# Patient Record
Sex: FEMALE | Race: BLACK OR AFRICAN AMERICAN | Employment: FULL TIME | URBAN - METROPOLITAN AREA
[De-identification: names, ages, dates, MRNs, and addresses within clinical notes are randomized per-mention and may not be internally consistent; named-entity substitution may affect disease eponyms.]

---

## 2017-01-04 ENCOUNTER — HOSPITAL ENCOUNTER (EMERGENCY)
Age: 32
Discharge: HOME | End: 2017-01-04
Attending: EMERGENCY MEDICINE
Payer: SELF-PAY

## 2017-01-04 VITALS
RESPIRATION RATE: 18 BRPM | HEIGHT: 63 IN | TEMPERATURE: 98 F | OXYGEN SATURATION: 100 % | WEIGHT: 140 LBS | BODY MASS INDEX: 24.8 KG/M2 | DIASTOLIC BLOOD PRESSURE: 60 MMHG | HEART RATE: 100 BPM | SYSTOLIC BLOOD PRESSURE: 110 MMHG

## 2017-01-04 DIAGNOSIS — O20.0 THREATENED MISCARRIAGE IN EARLY PREGNANCY: Primary | ICD-10-CM

## 2017-01-04 DIAGNOSIS — R10.9 ABDOMINAL PAIN, OTHER SPECIFIED SITE: ICD-10-CM

## 2017-01-04 LAB
ALBUMIN SERPL BCP-MCNC: 3.8 G/DL (ref 3.5–5)
ALBUMIN/GLOB SERPL: 1.1 {RATIO} (ref 1.2–3.5)
ALP SERPL-CCNC: 68 U/L (ref 50–136)
ALT SERPL-CCNC: 22 U/L (ref 12–65)
ANION GAP BLD CALC-SCNC: 6 MMOL/L (ref 7–16)
AST SERPL W P-5'-P-CCNC: 15 U/L (ref 15–37)
BASOPHILS # BLD AUTO: 0 K/UL (ref 0–0.2)
BASOPHILS # BLD: 0 % (ref 0–2)
BILIRUB SERPL-MCNC: 0.2 MG/DL (ref 0.2–1.1)
BUN SERPL-MCNC: 6 MG/DL (ref 6–23)
CALCIUM SERPL-MCNC: 8.8 MG/DL (ref 8.3–10.4)
CHLORIDE SERPL-SCNC: 105 MMOL/L (ref 98–107)
CO2 SERPL-SCNC: 27 MMOL/L (ref 21–32)
CREAT SERPL-MCNC: 0.72 MG/DL (ref 0.6–1)
DIFFERENTIAL METHOD BLD: ABNORMAL
EOSINOPHIL # BLD: 0.1 K/UL (ref 0–0.8)
EOSINOPHIL NFR BLD: 2 % (ref 0.5–7.8)
ERYTHROCYTE [DISTWIDTH] IN BLOOD BY AUTOMATED COUNT: 12.4 % (ref 11.9–14.6)
GLOBULIN SER CALC-MCNC: 3.6 G/DL (ref 2.3–3.5)
GLUCOSE SERPL-MCNC: 90 MG/DL (ref 65–100)
HCG SERPL-ACNC: ABNORMAL MIU/ML (ref 0–6)
HCG UR QL: POSITIVE
HCT VFR BLD AUTO: 39.4 % (ref 35.8–46.3)
HGB BLD-MCNC: 13.2 G/DL (ref 11.7–15.4)
IMM GRANULOCYTES # BLD: 0 K/UL (ref 0–0.5)
IMM GRANULOCYTES NFR BLD AUTO: 0.2 % (ref 0–5)
LYMPHOCYTES # BLD AUTO: 35 % (ref 13–44)
LYMPHOCYTES # BLD: 2.3 K/UL (ref 0.5–4.6)
MCH RBC QN AUTO: 30 PG (ref 26.1–32.9)
MCHC RBC AUTO-ENTMCNC: 33.5 G/DL (ref 31.4–35)
MCV RBC AUTO: 89.5 FL (ref 79.6–97.8)
MONOCYTES # BLD: 0.5 K/UL (ref 0.1–1.3)
MONOCYTES NFR BLD AUTO: 7 % (ref 4–12)
NEUTS SEG # BLD: 3.7 K/UL (ref 1.7–8.2)
NEUTS SEG NFR BLD AUTO: 56 % (ref 43–78)
PLATELET # BLD AUTO: 236 K/UL (ref 150–450)
PMV BLD AUTO: 9.5 FL (ref 10.8–14.1)
POTASSIUM SERPL-SCNC: 3.9 MMOL/L (ref 3.5–5.1)
PROT SERPL-MCNC: 7.4 G/DL (ref 6.3–8.2)
RBC # BLD AUTO: 4.4 M/UL (ref 4.05–5.25)
SODIUM SERPL-SCNC: 138 MMOL/L (ref 136–145)
WBC # BLD AUTO: 6.6 K/UL (ref 4.3–11.1)

## 2017-01-04 PROCEDURE — 85025 COMPLETE CBC W/AUTO DIFF WBC: CPT | Performed by: EMERGENCY MEDICINE

## 2017-01-04 PROCEDURE — 96375 TX/PRO/DX INJ NEW DRUG ADDON: CPT | Performed by: EMERGENCY MEDICINE

## 2017-01-04 PROCEDURE — 99284 EMERGENCY DEPT VISIT MOD MDM: CPT | Performed by: EMERGENCY MEDICINE

## 2017-01-04 PROCEDURE — 96374 THER/PROPH/DIAG INJ IV PUSH: CPT | Performed by: EMERGENCY MEDICINE

## 2017-01-04 PROCEDURE — 80053 COMPREHEN METABOLIC PANEL: CPT | Performed by: EMERGENCY MEDICINE

## 2017-01-04 PROCEDURE — 81025 URINE PREGNANCY TEST: CPT

## 2017-01-04 PROCEDURE — 74011250636 HC RX REV CODE- 250/636: Performed by: EMERGENCY MEDICINE

## 2017-01-04 PROCEDURE — 84702 CHORIONIC GONADOTROPIN TEST: CPT | Performed by: EMERGENCY MEDICINE

## 2017-01-04 PROCEDURE — 81003 URINALYSIS AUTO W/O SCOPE: CPT | Performed by: EMERGENCY MEDICINE

## 2017-01-04 RX ORDER — SODIUM CHLORIDE 0.9 % (FLUSH) 0.9 %
5-10 SYRINGE (ML) INJECTION AS NEEDED
Status: DISCONTINUED | OUTPATIENT
Start: 2017-01-04 | End: 2017-01-05 | Stop reason: HOSPADM

## 2017-01-04 RX ORDER — ONDANSETRON 2 MG/ML
4 INJECTION INTRAMUSCULAR; INTRAVENOUS
Status: COMPLETED | OUTPATIENT
Start: 2017-01-04 | End: 2017-01-04

## 2017-01-04 RX ORDER — MORPHINE SULFATE 4 MG/ML
4 INJECTION, SOLUTION INTRAMUSCULAR; INTRAVENOUS
Status: COMPLETED | OUTPATIENT
Start: 2017-01-04 | End: 2017-01-04

## 2017-01-04 RX ORDER — SODIUM CHLORIDE 0.9 % (FLUSH) 0.9 %
5-10 SYRINGE (ML) INJECTION EVERY 8 HOURS
Status: DISCONTINUED | OUTPATIENT
Start: 2017-01-04 | End: 2017-01-05 | Stop reason: HOSPADM

## 2017-01-04 RX ADMIN — MORPHINE SULFATE 4 MG: 4 INJECTION, SOLUTION INTRAMUSCULAR; INTRAVENOUS at 20:56

## 2017-01-04 RX ADMIN — ONDANSETRON 4 MG: 2 INJECTION INTRAMUSCULAR; INTRAVENOUS at 20:56

## 2017-01-04 NOTE — ED TRIAGE NOTES
+8 weeks preg, c/o lower abd pain, seen at [facility] yesterday with ultrasound done and was told that they did not see the baby. Pt was instructed to come back if pain persisted.

## 2017-01-05 NOTE — ED PROVIDER NOTES
HPI Comments: No vaginal bleeding today. Patient was seen yesterday in [facility] for this pain and with a history of recent bleeding. She was found to have a quantitative beta hCG in the 40,000 range but only a gestational sac and yolk sac on ultrasound. No fetal heartbeat was noted. Patient is a 36 y.o. female presenting with pregnancy problem. The history is provided by the patient. Pregnancy Problem    This is a new problem. The current episode started 2 days ago. The problem occurs constantly. The problem has not changed since onset. Pain location: lower abdominal pain. The quality of the pain is sharp. The pain is severe. Pertinent negatives include no fever, no diarrhea, no nausea, no vomiting, no dysuria, no frequency, no headaches, no myalgias, no chest pain and no back pain. Nothing worsens the pain. The pain is relieved by nothing. History reviewed. No pertinent past medical history. History reviewed. No pertinent past surgical history. Family History:   Problem Relation Age of Onset    Diabetes Father     Cancer Other     Breast Problems Other        Social History     Social History    Marital status: SINGLE     Spouse name: N/A    Number of children: N/A    Years of education: N/A     Occupational History    Not on file. Social History Main Topics    Smoking status: Never Smoker    Smokeless tobacco: Not on file    Alcohol use No    Drug use: No    Sexual activity: Not on file     Other Topics Concern    Not on file     Social History Narrative         ALLERGIES: Review of patient's allergies indicates no known allergies. Review of Systems   Constitutional: Negative for chills and fever. HENT: Negative for congestion, rhinorrhea and sore throat. Eyes: Negative for photophobia and redness. Respiratory: Negative for cough and shortness of breath. Cardiovascular: Negative for chest pain and leg swelling.    Gastrointestinal: Negative for abdominal pain, diarrhea, nausea and vomiting. Endocrine: Negative for polydipsia and polyuria. Genitourinary: Negative for dysuria and frequency. Musculoskeletal: Negative for back pain and myalgias. Neurological: Negative for weakness, numbness and headaches. Vitals:    01/04/17 1825   BP: 134/68   Pulse: 100   Resp: 16   Temp: 98 °F (36.7 °C)   SpO2: 99%   Weight: 63.5 kg (140 lb)   Height: 5' 3\" (1.6 m)            Physical Exam   Constitutional: She is oriented to person, place, and time. She appears well-developed and well-nourished. HENT:   Mouth/Throat: Oropharynx is clear and moist. No oropharyngeal exudate. Eyes: Conjunctivae are normal. Pupils are equal, round, and reactive to light. Neck: Normal range of motion. Neck supple. Cardiovascular: Normal rate, regular rhythm and normal heart sounds. No murmur heard. Pulmonary/Chest: Breath sounds normal. No respiratory distress. Abdominal: Soft. She exhibits no distension. There is no tenderness. There is no rebound and no guarding. Musculoskeletal: Normal range of motion. She exhibits no edema or tenderness. Neurological: She is alert and oriented to person, place, and time. She has normal strength. No sensory deficit. Skin: Skin is warm and dry. Nursing note and vitals reviewed. MDM  Number of Diagnoses or Management Options  Diagnosis management comments:   ADMISSION DATE: 01/03/2017 08:48    EXAM:  TRANSABDOMINAL/TRANSVAGINAL PELVIC ULTRASOUND    INDICATION:  Vaginal bleeding with positive pregnancy test    COMPARISON:  Ultrasound of 09/17/2014    FINDINGS:    Renal situs and orientation:  Normal    Bladder configuration:  Normal    Free Fluid: Tiny sliver of fluid about the right ovary    Uterus:  Measures 5.6 of 6.6 to 10 centimeters (volume is 192 mL).   There is an intrauterine gestational sac seen.  A yolk sac is identified.  Neither fetal pole or heart beat are identified with either approach.     Right Ovary:  Normal, volume is 11.1 mL    Left Ovary:  Normal, volume is 7.1 mL. Adnexal Structures:  No abnormality demonstrated    From S yesterday  IMPRESSION:    INTRAUTERINE GESTATION IS CONFIRMED WITH YOL[address] BEING VISUALIZED.  HOWEVER, NO FETAL POLE OR HEART BEAT IS IDENTIFIED.  OVARIES  [address] ADNEXAL MASS.    9:21 PM  No fever or leukocytosis to suggest appendicitis. The patient had a pelvic exam last night. PID unlikely in pregnancy. No bleeding this evening. We'll medicate here and have patient follow-up for her outpatient viability ultrasound on the 17th. Amount and/or Complexity of Data Reviewed  Clinical lab tests: ordered and reviewed  Discuss the patient with other providers: yes (Discussed with the attending and the resident and agree [facility].  They reviewed the notes and there was no plan for [facility]  by the maternal-fetal medicine folks. They have a scheduled ultrasound for fetal viability scheduled on the 17th. I discussed all her results tonight and no repeat ultrasound was recommended. Tylenol only for pain was recommended. )      ED Course       Procedures

## 2017-01-05 NOTE — ED NOTES
The patient was given their discharge instructions and  was not given prescriptions. The  patient verbalized understanding and had no additional questions. The patient was alert and was discharged via Wheelchair, without additional complaints at time of discharge.   No apparent distress noted

## 2017-01-05 NOTE — DISCHARGE INSTRUCTIONS
Threatened Miscarriage: Care Instructions  Your Care Instructions    Some women have light spotting or bleeding during the first 12 weeks of pregnancy. In some cases this is normal. Light spotting or bleeding can also be a sign of a possible loss of the pregnancy. This is called a threatened miscarriage. At this point, the doctor may not be able to tell if your vaginal bleeding is normal or is a sign of a miscarriage. In early pregnancy, things such as stress, exercise, and sex do not cause miscarriage. You may be worried or upset about the possibility of losing your pregnancy. But do not blame yourself. There is no treatment to stop a threatened miscarriage. If you do have a miscarriage, there was nothing you could have done to prevent it. A miscarriage usually means that the pregnancy is not developing normally. The doctor has checked you carefully, but problems can develop later. If you notice any problems or new symptoms, get medical treatment right away. Follow-up care is a key part of your treatment and safety. Be sure to make and go to all appointments, and call your doctor if you are having problems. It's also a good idea to know your test results and keep a list of the medicines you take. How can you care for yourself at home? · If you do have a miscarriage, you will probably have some vaginal bleeding for 1 to 2 weeks. Use pads instead of tampons. · Take acetaminophen (Tylenol) for cramps. Read and follow all instructions on the label. · Do not take two or more pain medicines at the same time unless the doctor told you to. Many pain medicines have acetaminophen, which is Tylenol. Too much acetaminophen (Tylenol) can be harmful. · Do not have sex until your doctor says it is okay. · Get lots of rest over the next several days. · You may do your normal activities if you feel well enough to do them. But do not do any heavy exercise until your doctor says it is okay.   · Eat a balanced diet that is high in iron and vitamin C. Foods rich in iron include red meat, shellfish, eggs, beans, and leafy green vegetables. Foods high in vitamin C include citrus fruits, tomatoes, and broccoli. Talk to your doctor about whether you need to take iron pills or a multivitamin. · Do not drink alcohol or use tobacco or illegal drugs. · Do not smoke. If you need help quitting, talk to your doctor about stop-smoking programs and medicines. These can increase your chances of quitting for good. When should you call for help? Call 911 anytime you think you may need emergency care. For example, call if:  · You have sudden, severe pain in your belly or pelvis. · You passed out (lost consciousness). · You have severe vaginal bleeding. Call your doctor now or seek immediate medical care if:  · You are dizzy or lightheaded, or you feel like you may faint. · You have new or increased pain in your belly or pelvis. · Your vaginal bleeding is getting worse. · You have increased pain in the vaginal area. · You have a fever. · You think you may have passed tissue. Save any tissue that you pass. Take it to your doctor's office as soon as you can. Watch closely for changes in your health, and be sure to contact your doctor if:  · You have new or worse vaginal discharge. · You do not get better as expected. Where can you learn more? Go to http://cara-emory.info/. Enter V862 in the search box to learn more about \"Threatened Miscarriage: Care Instructions. \"  Current as of: May 30, 2016  Content Version: 11.1  © 0783-1653 [employer]. Care instructions adapted under license by [employer] (which disclaims liability or warranty for this information). If you have questions about a medical condition or this instruction, always ask your healthcare professional. [address] any warranty or liability for your use of this information.       Belly Pain in Pregnancy: Care Instructions  Your Care Instructions  When you're pregnant, any belly pain can be a worry. You may not want to call your doctor about every pain you have. But you don't want to miss something that is dangerous for you or your baby. Even if it feels familiar, belly pain can mean something new when you're pregnant. It's important to know when to call your doctor. It will also help to know how to care for yourself at home when your pain is not caused by anything harmful. · When belly pain is more severe or constant, see a doctor right away. · If you're sure your belly pain is a sign of labor, call your doctor. · When belly pain is brief, it's usually a normal part of pregnancy. It might be related to changes in the growing uterus. Or it could be the stretching of ligaments called round ligaments. These ligaments help support the uterus. Round ligament pain can be on either side of your belly. It can also be felt in your hips or groin. Follow-up care is a key part of your treatment and safety. Be sure to make and go to all appointments, and call your doctor if you are having problems. It's also a good idea to know your test results and keep a list of the medicines you take. How can you tell if belly pain is a sign of labor? When belly pain is caused by labor, it can feel like mild or menstrual-like cramps in your lower belly. These cramps are probably contractions. They can happen in your second or third trimester. You may also have:  · A steady, dull ache in your lower back, pelvis, or thighs. · A feeling of pressure in your pelvis or lower belly. · Changes in your vaginal discharge or a sudden release of fluid from the vagina. If you think you are in labor, call your doctor. How can you care for yourself at home? When belly pain is mild and is not a symptom of labor:  · Rest until you feel better. · Take a warm bath. · Think about what you drink and eat:  ¨ Drink plenty of fluids.  Choose water and other caffeine-free clear liquids until you feel better. ¨ Try eating small, frequent meals. If your stomach is upset, try bland, low-fat foods like plain rice, broiled chicken, toast, and yogurt. · Think about how you move if you are having brief pains from stretching of the round ligaments. ¨ Try gentle stretching. ¨ Move a little more slowly when turning in bed or getting up from a chair, so those ligaments don't stretch quickly. ¨ Lean forward a bit if you think you are going to cough or sneeze. When should you call for help? Call 911 anytime you think you may need emergency care. For example, call if:  · You have sudden, severe pain in your belly. · You have severe vaginal bleeding. Call your doctor now or seek immediate medical care if:  · You have new or worse belly pain or cramping. · You have any vaginal bleeding. · You have a fever. · You have symptoms of preeclampsia, such as:  ¨ Sudden swelling of your face, hands, or feet. ¨ New vision problems (such as dimness or blurring). ¨ A severe headache. · You think that you may be in labor. This means that you've had at least 8 contractions within 1 hour or at least 4 contractions within 20 minutes, even after you change your position and drink fluids. · You have symptoms of a urinary tract infection. These may include:  ¨ Pain or burning when you urinate. ¨ A frequent need to urinate without being able to pass much urine. ¨ Pain in the flank, which is just below the rib cage and above the waist on either side of the back. ¨ Blood in your urine. Watch closely for changes in your health, and be sure to contact your doctor if you are worried about your or your baby's health. Where can you learn more? Go to Snaapiq.be  Enter B275 in the search box to learn more about \"Belly Pain in Pregnancy: Care Instructions. \"   © 7950-8353 [employer] Inc[employer].  Care instructions adapted under license by [facility] (which disclaims liability or warranty for this information). This care instruction is for use with your licensed healthcare professional. If you have questions about a medical condition or this instruction, always ask your healthcare professional. [address] any warranty or liability for your use of this information.   Content Version: 45.9.472528; Current as of: November 12, 2015

## 2019-03-10 ENCOUNTER — HOSPITAL ENCOUNTER (EMERGENCY)
Age: 34
Discharge: HOME | End: 2019-03-10
Attending: EMERGENCY MEDICINE
Payer: COMMERCIAL

## 2019-03-10 VITALS
OXYGEN SATURATION: 100 % | BODY MASS INDEX: 24.8 KG/M2 | TEMPERATURE: 98.8 F | RESPIRATION RATE: 17 BRPM | WEIGHT: 140 LBS | SYSTOLIC BLOOD PRESSURE: 119 MMHG | HEIGHT: 63 IN | HEART RATE: 79 BPM | DIASTOLIC BLOOD PRESSURE: 66 MMHG

## 2019-03-10 DIAGNOSIS — R55 NEAR SYNCOPE: Primary | ICD-10-CM

## 2019-03-10 LAB
ALBUMIN SERPL-MCNC: 3.3 G/DL (ref 3.5–5)
ALBUMIN/GLOB SERPL: 1 {RATIO}
ALP SERPL-CCNC: 61 U/L (ref 50–130)
ALT SERPL-CCNC: 17 U/L (ref 12–65)
ANION GAP SERPL CALC-SCNC: 8 MMOL/L
AST SERPL-CCNC: 30 U/L (ref 15–37)
BASOPHILS # BLD: 0 K/UL (ref 0–0.2)
BASOPHILS NFR BLD: 0 % (ref 0–2)
BILIRUB SERPL-MCNC: 0.3 MG/DL (ref 0.2–1.1)
BUN SERPL-MCNC: 10 MG/DL (ref 6–23)
CALCIUM SERPL-MCNC: 8.8 MG/DL (ref 8.3–10.4)
CHLORIDE SERPL-SCNC: 108 MMOL/L (ref 98–107)
CO2 SERPL-SCNC: 23 MMOL/L (ref 21–32)
CREAT SERPL-MCNC: 0.86 MG/DL (ref 0.6–1)
DIFFERENTIAL METHOD BLD: ABNORMAL
EOSINOPHIL # BLD: 0 K/UL (ref 0–0.8)
EOSINOPHIL NFR BLD: 0 % (ref 0.5–7.8)
ERYTHROCYTE [DISTWIDTH] IN BLOOD BY AUTOMATED COUNT: 12.4 % (ref 11.9–14.6)
GLOBULIN SER CALC-MCNC: 3.3 G/DL (ref 2.3–3.5)
GLUCOSE SERPL-MCNC: 77 MG/DL (ref 65–100)
HCG UR QL: NEGATIVE
HCT VFR BLD AUTO: 42.2 % (ref 35.8–46.3)
HGB BLD-MCNC: 13.5 G/DL (ref 11.7–15.4)
IMM GRANULOCYTES # BLD AUTO: 0 K/UL (ref 0–0.5)
IMM GRANULOCYTES NFR BLD AUTO: 0 % (ref 0–5)
LYMPHOCYTES # BLD: 1.1 K/UL (ref 0.5–4.6)
LYMPHOCYTES NFR BLD: 15 % (ref 13–44)
MCH RBC QN AUTO: 30.1 PG (ref 26.1–32.9)
MCHC RBC AUTO-ENTMCNC: 32 G/DL (ref 31.4–35)
MCV RBC AUTO: 94 FL (ref 79.6–97.8)
MONOCYTES # BLD: 0.5 K/UL (ref 0.1–1.3)
MONOCYTES NFR BLD: 7 % (ref 4–12)
NEUTS SEG # BLD: 5.8 K/UL (ref 1.7–8.2)
NEUTS SEG NFR BLD: 78 % (ref 43–78)
NRBC # BLD: 0 K/UL (ref 0–0.2)
PLATELET # BLD AUTO: 277 K/UL (ref 150–450)
PMV BLD AUTO: 9.5 FL (ref 9.4–12.3)
POTASSIUM SERPL-SCNC: 5.3 MMOL/L (ref 3.5–5.1)
PROT SERPL-MCNC: 6.6 G/DL
RBC # BLD AUTO: 4.49 M/UL (ref 4.05–5.2)
SODIUM SERPL-SCNC: 139 MMOL/L (ref 136–145)
WBC # BLD AUTO: 7.5 K/UL (ref 4.3–11.1)

## 2019-03-10 PROCEDURE — 99284 EMERGENCY DEPT VISIT MOD MDM: CPT | Performed by: EMERGENCY MEDICINE

## 2019-03-10 PROCEDURE — 80053 COMPREHEN METABOLIC PANEL: CPT

## 2019-03-10 PROCEDURE — 96360 HYDRATION IV INFUSION INIT: CPT | Performed by: EMERGENCY MEDICINE

## 2019-03-10 PROCEDURE — 81025 URINE PREGNANCY TEST: CPT

## 2019-03-10 PROCEDURE — 74011250636 HC RX REV CODE- 250/636: Performed by: EMERGENCY MEDICINE

## 2019-03-10 PROCEDURE — 96361 HYDRATE IV INFUSION ADD-ON: CPT | Performed by: EMERGENCY MEDICINE

## 2019-03-10 PROCEDURE — 81003 URINALYSIS AUTO W/O SCOPE: CPT | Performed by: EMERGENCY MEDICINE

## 2019-03-10 PROCEDURE — 85025 COMPLETE CBC W/AUTO DIFF WBC: CPT

## 2019-03-10 RX ADMIN — SODIUM CHLORIDE 1000 ML: 900 INJECTION, SOLUTION INTRAVENOUS at 11:40

## 2019-03-10 NOTE — DISCHARGE INSTRUCTIONS
Patient Education        Lightheadedness or Faintness: Care Instructions  Your Care Instructions  Lightheadedness is a feeling that you are about to faint or \"pass out. \" You do not feel as if you or your surroundings are moving. It is different from vertigo, which is the feeling that you or things around you are spinning or tilting. Lightheadedness usually goes away or gets better when you lie down. If lightheadedness gets worse, it can lead to a fainting spell. It is common to feel lightheaded from time to time. Lightheadedness usually is not caused by a serious problem. It often is caused by a short-lasting drop in blood pressure and blood flow to your head that occurs when you get up too quickly from a seated or lying position. Follow-up care is a key part of your treatment and safety. Be sure to make and go to all appointments, and call your doctor if you are having problems. It's also a good idea to know your test results and keep a list of the medicines you take. How can you care for yourself at home? · Lie down for 1 or 2 minutes when you feel lightheaded. After lying down, sit up slowly and remain sitting for 1 to 2 minutes before slowly standing up. · Avoid movements, positions, or activities that have made you lightheaded in the past.  · Get plenty of rest, especially if you have a cold or flu, which can cause lightheadedness. · Make sure you drink plenty of fluids, especially if you have a fever or have been sweating. · Do not drive or put yourself and others in danger while you feel lightheaded. When should you call for help? Call 911 anytime you think you may need emergency care. For example, call if:    · You have symptoms of a stroke. These may include:  ? Sudden numbness, tingling, weakness, or loss of movement in your face, arm, or leg, especially on only one side of your body. ? Sudden vision changes. ? Sudden trouble speaking.   ? Sudden confusion or trouble understanding simple statements. ? Sudden problems with walking or balance. ? A sudden, severe headache that is different from past headaches.     · You have symptoms of a heart attack. These may include:  ? Chest pain or pressure, or a strange feeling in the chest.  ? Sweating. ? Shortness of breath. ? Nausea or vomiting. ? Pain, pressure, or a strange feeling in the back, neck, jaw, or upper belly or in one or both shoulders or arms. ? Lightheadedness or sudden weakness. ? A fast or irregular heartbeat. After you call 911, the  may tell you to chew 1 adult-strength or 2 to 4 low-dose aspirin. Wait for an ambulance. Do not try to drive yourself.    Watch closely for changes in your health, and be sure to contact your doctor if:    · Your lightheadedness gets worse or does not get better with home care. Where can you learn more? Go to http://cara-emory.info/. Enter A560 in the search box to learn more about \"Lightheadedness or Faintness: Care Instructions. \"  Current as of: September 23, 2018  Content Version: 11.9  © 5183-4084 [employer]. Care instructions adapted under license by [employer] (which disclaims liability or warranty for this information). If you have questions about a medical condition or this instruction, always ask your healthcare professional. [address] any warranty or liability for your use of this information.

## 2019-03-10 NOTE — ED PROVIDER NOTES
Patient had a heavier than normal.  Lasting 7 days, finishing one to 2 days ago. Has had increased exercise routine recently and taking care of 2 kids. He has also been on diet. Ate A banana for breakfast.  He has not eaten anything since then. Was at a workout with her  and after 30 minutes, became very lightheaded and weak. No headache. No chest pain or shortness of breath. Mild nausea. EMS states patient was pale on their arrival with blood pressure systolic 82. Improved with fluids. Feeling some better here. The history is provided by the patient. No  was used. Dizziness   This is a new problem. The current episode started less than 1 hour ago. The problem has been gradually improving. There was no focality noted. Pertinent negatives include no focal weakness, no loss of sensation, no loss of balance, no slurred speech, no speech difficulty, no movement disorder, no visual change, no mental status change, no unresponsiveness and no disorientation. There has been no fever. Associated symptoms include nausea. Pertinent negatives include no shortness of breath, no chest pain, no vomiting, no altered mental status, no confusion, no headaches, no bowel incontinence and no bladder incontinence. History reviewed. No pertinent past medical history. History reviewed. No pertinent surgical history.       Family History:   Problem Relation Age of Onset    Diabetes Father     Cancer Other     Breast Problems Other        Social History     Socioeconomic History    Marital status: SINGLE     Spouse name: Not on file    Number of children: Not on file    Years of education: Not on file    Highest education level: Not on file   Social Needs    Financial resource strain: Not on file    Food insecurity - worry: Not on file    Food insecurity - inability: Not on file   [employer] needs - medical: Not on file   [employer] needs - non-medical: Not on file Occupational History    Not on file   Tobacco Use    Smoking status: Never Smoker   Substance and Sexual Activity    Alcohol use: No    Drug use: No    Sexual activity: Not on file   Other Topics Concern    Not on file   Social History Narrative    Not on file         ALLERGIES: Patient has no known allergies. Review of Systems   Constitutional: Negative for chills and fever. HENT: Negative for rhinorrhea and sore throat. Eyes: Negative for pain and redness. Respiratory: Negative for chest tightness, shortness of breath and wheezing. Cardiovascular: Negative for chest pain and leg swelling. Gastrointestinal: Positive for nausea. Negative for abdominal pain, bowel incontinence, diarrhea and vomiting. Genitourinary: Negative for bladder incontinence, dysuria, hematuria, vaginal bleeding and vaginal discharge. Musculoskeletal: Negative for back pain, gait problem, neck pain and neck stiffness. Skin: Negative for color change and rash. Neurological: Positive for syncope (near syncope) and light-headedness. Negative for focal weakness, speech difficulty, weakness, numbness, headaches and loss of balance. Psychiatric/Behavioral: Negative for confusion. There were no vitals filed for this visit. Physical Exam   Constitutional: She is oriented to person, place, and time. She appears well-developed and well-nourished. HENT:   Head: Normocephalic and atraumatic. Eyes: EOM are normal. Pupils are equal, round, and reactive to light. Neck: Normal range of motion. Neck supple. Cardiovascular: Normal rate and regular rhythm. Pulmonary/Chest: Effort normal and breath sounds normal.   Abdominal: Soft. Bowel sounds are normal. There is no tenderness. Musculoskeletal: Normal range of motion. She exhibits no edema. Neurological: She is alert and oriented to person, place, and time. Skin: Skin is warm and dry.         MDM  Number of Diagnoses or Management Options  Diagnosis management comments: Patient with near syncopal episode, feeling better here with fluids. Will discharge. Amount and/or Complexity of Data Reviewed  Clinical lab tests: ordered and reviewed  Tests in the medicine section of CPT®: ordered and reviewed    Patient Progress  Patient progress: stable         Procedures           Results Include:    Recent Results (from the past 24 hour(s))   METABOLIC PANEL, COMPREHENSIVE    Collection Time: 03/10/19 12:04 PM   Result Value Ref Range    Sodium 139 136 - 145 mmol/L    Potassium 5.3 (H) 3.5 - 5.1 mmol/L    Chloride 108 (H) 98 - 107 mmol/L    CO2 23 21 - 32 mmol/L    Anion gap 8 mmol/L    Glucose 77 65 - 100 mg/dL    BUN 10 6 - 23 MG/DL    Creatinine 0.86 0.6 - 1.0 MG/DL    GFR est AA >60 >60 ml/min/1.73m2    GFR est non-AA >60 ml/min/1.73m2    Calcium 8.8 8.3 - 10.4 MG/DL    Bilirubin, total 0.3 0.2 - 1.1 MG/DL    ALT (SGPT) 17 12 - 65 U/L    AST (SGOT) 30 15 - 37 U/L    Alk. phosphatase 61 50 - 130 U/L    Protein, total 6.6 g/dL    Albumin 3.3 (L) 3.5 - 5.0 g/dL    Globulin 3.3 2.3 - 3.5 g/dL    A-G Ratio 1.0     CBC WITH AUTOMATED DIFF    Collection Time: 03/10/19 12:15 PM   Result Value Ref Range    WBC 7.5 4.3 - 11.1 K/uL    RBC 4.49 4.05 - 5.2 M/uL    HGB 13.5 11.7 - 15.4 g/dL    HCT 42.2 35.8 - 46.3 %    MCV 94.0 79.6 - 97.8 FL    MCH 30.1 26.1 - 32.9 PG    MCHC 32.0 31.4 - 35.0 g/dL    RDW 12.4 11.9 - 14.6 %    PLATELET 706 271 - 175 K/uL    MPV 9.5 9.4 - 12.3 FL    ABSOLUTE NRBC 0.00 0.0 - 0.2 K/uL    DF AUTOMATED      NEUTROPHILS 78 43 - 78 %    LYMPHOCYTES 15 13 - 44 %    MONOCYTES 7 4.0 - 12.0 %    EOSINOPHILS 0 (L) 0.5 - 7.8 %    BASOPHILS 0 0.0 - 2.0 %    IMMATURE GRANULOCYTES 0 0.0 - 5.0 %    ABS. NEUTROPHILS 5.8 1.7 - 8.2 K/UL    ABS. LYMPHOCYTES 1.1 0.5 - 4.6 K/UL    ABS. MONOCYTES 0.5 0.1 - 1.3 K/UL    ABS. EOSINOPHILS 0.0 0.0 - 0.8 K/UL    ABS. BASOPHILS 0.0 0.0 - 0.2 K/UL    ABS. IMM.  GRANS. 0.0 0.0 - 0.5 K/UL   HCG URINE, QL. - POC    Collection Time: 03/10/19 12:54 PM   Result Value Ref Range    Pregnancy test,urine (POC) NEGATIVE  NEG

## 2019-03-10 NOTE — ED TRIAGE NOTES
Pt in c/o dizziness after working out at gym. Pt initial bp was 89 systolic with ems. Pt given 300 ml lactated ringer's and bp increased to 455 systolic. Pt states recently finished her period. States she a only a banana prior to going to gym. bgl with ems 93.

## 2019-03-10 NOTE — ED NOTES
I have reviewed discharge instructions with the patient. The patient verbalized understanding. Patient left ED via Discharge Method: ambulatory to Home with boyfriend. Opportunity for questions and clarification provided. Patient given 0 scripts. To continue your aftercare when you leave the hospital, you may receive an automated call from our care team to check in on how you are doing. This is a free service and part of our promise to provide the best care and service to meet your aftercare needs.  If you have questions, or wish to unsubscribe from this service please call [phone]. Thank you for Choosing our [facility] Emergency Department.

## 2019-10-02 ENCOUNTER — HOSPITAL ENCOUNTER (EMERGENCY)
Age: 34
Discharge: HOME | End: 2019-10-02
Attending: EMERGENCY MEDICINE
Payer: COMMERCIAL

## 2019-10-02 ENCOUNTER — APPOINTMENT (OUTPATIENT)
Dept: GENERAL RADIOLOGY | Age: 34
End: 2019-10-02
Attending: EMERGENCY MEDICINE
Payer: COMMERCIAL

## 2019-10-02 VITALS
BODY MASS INDEX: 25.3 KG/M2 | SYSTOLIC BLOOD PRESSURE: 126 MMHG | WEIGHT: 142.8 LBS | RESPIRATION RATE: 18 BRPM | OXYGEN SATURATION: 100 % | HEART RATE: 65 BPM | DIASTOLIC BLOOD PRESSURE: 82 MMHG | HEIGHT: 63 IN | TEMPERATURE: 98.3 F

## 2019-10-02 DIAGNOSIS — S63.502A SPRAIN OF LEFT WRIST, INITIAL ENCOUNTER: ICD-10-CM
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
## 2019-10-02 PROCEDURE — 73110 X-RAY EXAM OF WRIST: CPT

## 2019-10-02 PROCEDURE — 99283 EMERGENCY DEPT VISIT LOW MDM: CPT | Performed by: EMERGENCY MEDICINE

## 2019-10-02 RX ORDER — CYCLOBENZAPRINE HCL 5 MG
10 TABLET ORAL
Qty: 20 TAB | Refills: 0 | Status: SHIPPED | OUTPATIENT
Start: 2019-10-02 | End: 2019-10-16

## 2019-10-02 RX ORDER — DICLOFENAC POTASSIUM 50 MG/1
50 TABLET, FILM COATED ORAL 3 TIMES DAILY
Qty: 15 TAB | Refills: 0 | Status: SHIPPED | OUTPATIENT
Start: 2019-10-02 | End: 2019-10-16 | Stop reason: SDDI
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
## 2019-10-02 NOTE — LETTER
[id number] [address] EMERGENCY DEPT  [address] [facility] [address] [phone]    Work/School Note    Date: 10/2/2019    To Whom It May concern:    Jose QUEZADA JulianTejalLinwood was seen and treated today in the emergency room by the following provider(s):  Attending Provider: Lawrence Nunez MD.      Gerald Casarez may return to work on 10/3/19.     Sincerely,          Latrell Draper MD

## 2019-10-02 NOTE — ED NOTES
I have reviewed discharge instructions with the patient. The patient verbalized understanding. Patient left ED via Discharge Method: ambulatory to Home with male  at bedside. Opportunity for questions and clarification provided. Patient given 2 scripts. To continue your aftercare when you leave the hospital, you may receive an automated call from our care team to check in on how you are doing. This is a free service and part of our promise to provide the best care and service to meet your aftercare needs.  If you have questions, or wish to unsubscribe from this service please call [phone]. Thank you for Choosing our [facility] Emergency Department.
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
## 2019-10-09 ENCOUNTER — APPOINTMENT (OUTPATIENT)
Dept: GENERAL RADIOLOGY | Age: 34
End: 2019-10-09
Attending: EMERGENCY MEDICINE
Payer: COMMERCIAL

## 2019-10-09 ENCOUNTER — HOSPITAL ENCOUNTER (EMERGENCY)
Age: 34
Discharge: HOME | End: 2019-10-09
Attending: EMERGENCY MEDICINE
Payer: COMMERCIAL

## 2019-10-09 VITALS
HEART RATE: 82 BPM | WEIGHT: 143 LBS | SYSTOLIC BLOOD PRESSURE: 128 MMHG | BODY MASS INDEX: 25.34 KG/M2 | OXYGEN SATURATION: 99 % | DIASTOLIC BLOOD PRESSURE: 73 MMHG | TEMPERATURE: 98.8 F | HEIGHT: 63 IN | RESPIRATION RATE: 18 BRPM

## 2019-10-09 DIAGNOSIS — S69.90XD INJURY OF WRIST, UNSPECIFIED LATERALITY, SUBSEQUENT ENCOUNTER: Primary | ICD-10-CM

## 2019-10-09 DIAGNOSIS — J06.9 VIRAL URI: ICD-10-CM

## 2019-10-09 DIAGNOSIS — M85.60 BONE CYST: ICD-10-CM

## 2019-10-09 DIAGNOSIS — F07.81 POSTCONCUSSIVE SYNDROME: ICD-10-CM

## 2019-10-09 PROCEDURE — 77030008298 HC SPLNT FBRGLS SCTCH 3M -A: Performed by: EMERGENCY MEDICINE

## 2019-10-09 PROCEDURE — 74011250636 HC RX REV CODE- 250/636: Performed by: EMERGENCY MEDICINE

## 2019-10-09 PROCEDURE — 77030019945 HC PDNG CST 3M -A: Performed by: EMERGENCY MEDICINE

## 2019-10-09 PROCEDURE — 96372 THER/PROPH/DIAG INJ SC/IM: CPT | Performed by: EMERGENCY MEDICINE

## 2019-10-09 PROCEDURE — 73110 X-RAY EXAM OF WRIST: CPT

## 2019-10-09 PROCEDURE — 75810000053 HC SPLINT APPLICATION: Performed by: EMERGENCY MEDICINE

## 2019-10-09 PROCEDURE — 99283 EMERGENCY DEPT VISIT LOW MDM: CPT | Performed by: EMERGENCY MEDICINE

## 2019-10-09 RX ORDER — KETOROLAC TROMETHAMINE 30 MG/ML
30 INJECTION, SOLUTION INTRAMUSCULAR; INTRAVENOUS
Status: COMPLETED | OUTPATIENT
Start: 2019-10-09 | End: 2019-10-09

## 2019-10-09 RX ADMIN — KETOROLAC TROMETHAMINE 30 MG: 30 INJECTION, SOLUTION INTRAMUSCULAR at 09:32

## 2019-10-09 NOTE — LETTER
[address] [address] EMERGENCY [address]PT  [address] [facility] [address] [phone]    Work/School Note    Date: 10/9/2019    To Whom It May concern:    Jose JordanTejalLinwood was seen and treated today in the emergency room by the following provider(s):  Attending Provider: Sabrina Cloud MD.      Latonya Canales may return to work on 10- IF she is able to do her job with a splinted arm, OR is assigned a job that she is able to do with a splinted arm. If not, then she should be excused from work until a follow-up physician clears her to return to work.      Sincerely,          Sharona Alexander RN
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
## 2019-10-09 NOTE — PROGRESS NOTES
Visited with patient as no PCP listed in chart. Demographics on face sheet verified. E-mail sent to [facility] for aid in finding a new PCP.

## 2019-10-09 NOTE — ED NOTES
I have reviewed discharge instructions with the patient. The patient verbalized understanding. Patient left ED via Discharge Method: ambulatory to Home with self. Opportunity for questions and clarification provided. Patient given 0 scripts. To continue your aftercare when you leave the hospital, you may receive an automated call from our care team to check in on how you are doing. This is a free service and part of our promise to provide the best care and service to meet your aftercare needs.  If you have questions, or wish to unsubscribe from this service please call [phone]. Thank you for Choosing our [facility] Emergency Department.

## 2019-10-09 NOTE — DISCHARGE INSTRUCTIONS
Patient Education      Ibuprofen 400 to 600 mg every 6 hours for pain. Tylenol 500 to 650 mg every 6 hours for pain. Over-the-counter Sudafed 12-hour twice daily for 4 to 5 days for sinus congestion. Over-the-counter saltwater nasal spray and sinus follow-up with work well for work note needs and with orthopedics for wrist injury/possible scaphoid (wrist bone) fracture vs cyst.  Saline Nasal Washes: Care Instructions  Your Care Instructions  Saline nasal washes help keep the nasal passages open by washing out thick or dried mucus. This simple remedy can help relieve symptoms of allergies, sinusitis, and colds. It also can make the nose feel more comfortable by keeping the mucous membranes moist. You may notice a little burning sensation in your nose the first few times you use the solution, but this usually gets better in a few days. Follow-up care is a key part of your treatment and safety. Be sure to make and go to all appointments, and call your doctor if you are having problems. It's also a good idea to know your test results and keep a list of the medicines you take. How can you care for yourself at home? · You can buy premixed saline solution in a squeeze bottle or other sinus rinse products at a drugstore. Read and follow the instructions on the label. · You also can make your own saline solution by adding 1 teaspoon of salt and 1 teaspoon of baking soda to 2 cups of distilled water. · If you use a homemade solution, pour a small amount into a clean bowl. Using a rubber bulb syringe, squeeze the syringe and place the tip in the salt water. Pull a small amount of the salt water into the syringe by relaxing your hand. · Sit down with your head tilted slightly back. Do not lie down. Put the tip of the bulb syringe or the squeeze bottle a little way into one of your nostrils. Gently drip or squirt a few drops into the nostril. Repeat with the other nostril.  Some sneezing and gagging are normal at first.  · Gently blow your nose. · Wipe the syringe or bottle tip clean after each use. · Repeat this 2 or 3 times a day. · Use nasal washes gently if you have nosebleeds often. When should you call for help? Watch closely for changes in your health, and be sure to contact your doctor if:    · You often get nosebleeds.     · You have problems doing the nasal washes. Where can you learn more? Go to http://cara-emory.info/. Enter [id number] in the search box to learn more about \"Saline Nasal Washes: Care Instructions. \"  Current as of: October 21, 2018  Content Version: 12.2  © 2325-8894 [employer]. Care instructions adapted under license by [employer] (which disclaims liability or warranty for this information). If you have questions about a medical condition or this instruction, always ask your healthcare professional. [place] [address] any warranty or liability for your use of this information. Patient Education        Postconcussion Syndrome: Care Instructions  Your Care Instructions    Postconcussion syndrome occurs after a blow to the head or body. Common symptoms are changes in the ability to concentrate, think, remember, or solve problems. Symptoms, which may include headaches, personality changes, and dizziness, may be related to stress from the events surrounding the accident that caused the injury. Follow-up care is a key part of your treatment and safety. Be sure to make and go to all appointments, and call your doctor if you are having problems. It's also a good idea to know your test results and keep a list of the medicines you take. How can you care for yourself at home? Pain  · Rest is the best treatment for postconcussion syndrome. · Do not drive if you have taken a prescription pain medicine. · Rest in a quiet, dark room until your headache is gone. Close your eyes and try to relax or go to sleep.  Do not watch TV or read.  · Put a cold, moist cloth or cold pack on the painful area for 10 to 20 minutes at a time. Put a thin cloth between the cold pack and your skin. · Have someone gently massage your neck and shoulders. · Take your medicines exactly as prescribed. Call your doctor if you think you are having a problem with your medicine. You will get more details on the specific medicines your doctor prescribes. Stress  · Try to reduce stress. Some ways to do this include:  ? Taking slow, deep breaths. ? Soaking in a warm bath. ? Listening to soothing music. ? Having a massage or back rub. ? Drinking a warm, nonalcoholic, noncaffeinated beverage. · Get enough sleep. · Eat a healthy, balanced diet. A balanced diet includes whole grains, dairy, fruits and vegetables, and protein. Eat a variety of foods from each of those groups so you get all the nutrients you need. · Avoid alcohol and illegal drugs. · Try relaxation exercises, such as breathing and muscle relaxation exercises. · Talk to your doctor about counseling. It may help you deal with stress from your accident. When should you call for help? Watch closely for changes in your health, and be sure to contact your doctor if:    · You do not get better as expected.     · Your symptoms, such as headaches, trouble concentrating, or changes in mood, get worse. Where can you learn more? Go to http://cara-emory.info/. Enter Z154 in the search box to learn more about \"Postconcussion Syndrome: Care Instructions. \"  Current as of: March 28, 2019  Content Version: 12.2  © 5042-3724 [employer]. Care instructions adapted under license by [employer] (which disclaims liability or warranty for this information). If you have questions about a medical condition or this instruction, always ask your healthcare professional. [address] any warranty or liability for your use of this information.

## 2019-10-16 PROBLEM — F33.9 MDD (MAJOR DEPRESSIVE DISORDER), RECURRENT EPISODE (HCC): Status: ACTIVE | Noted: 2019-10-16

## 2019-10-16 PROBLEM — Z83.79 FAMILY HISTORY OF CROHN'S DISEASE: Status: ACTIVE | Noted: 2019-10-16

## 2019-10-16 PROBLEM — F07.81 POST CONCUSSION SYNDROME: Status: ACTIVE | Noted: 2019-10-16

## 2019-10-16 PROBLEM — J30.9 AR (ALLERGIC RHINITIS): Status: ACTIVE | Noted: 2019-10-16

## 2019-10-16 PROBLEM — K21.9 GERD (GASTROESOPHAGEAL REFLUX DISEASE): Status: ACTIVE | Noted: 2019-10-16

## 2021-08-11 ENCOUNTER — HOSPITAL ENCOUNTER (OUTPATIENT)
Dept: LAB | Age: 36
Discharge: HOME | End: 2021-08-11
Payer: COMMERCIAL

## 2021-08-11 ENCOUNTER — HOSPITAL ENCOUNTER (EMERGENCY)
Age: 36
Discharge: STILL ADMITTED OR ADMINISTRATIVE | End: 2021-08-11
Payer: COMMERCIAL

## 2021-08-11 LAB — HCG SERPL-ACNC: ABNORMAL MIU/ML (ref 0–6)

## 2021-08-11 PROCEDURE — 84702 CHORIONIC GONADOTROPIN TEST: CPT

## 2021-08-11 PROCEDURE — 36415 COLL VENOUS BLD VENIPUNCTURE: CPT

## 2021-08-18 ENCOUNTER — HOSPITAL ENCOUNTER (OUTPATIENT)
Age: 36
Setting detail: OBSERVATION
Discharge: HOME | End: 2021-08-20
Attending: EMERGENCY MEDICINE | Admitting: OBSTETRICS & GYNECOLOGY
Payer: COMMERCIAL

## 2021-08-18 DIAGNOSIS — R11.10 HYPEREMESIS: ICD-10-CM

## 2021-08-18 DIAGNOSIS — O21.0 HYPEREMESIS GRAVIDARUM: Primary | ICD-10-CM

## 2021-08-18 PROBLEM — F17.200 CURRENT SMOKER ON SOME DAYS: Status: ACTIVE | Noted: 2020-10-28

## 2021-08-18 PROBLEM — Z01.411 ENCOUNTER FOR GYNECOLOGICAL EXAMINATION (GENERAL) (ROUTINE) WITH ABNORMAL FINDINGS: Status: ACTIVE | Noted: 2020-10-08

## 2021-08-18 PROBLEM — N89.8 VAGINAL DISCHARGE: Status: ACTIVE | Noted: 2020-10-29

## 2021-08-18 PROBLEM — F32.A ANXIETY AND DEPRESSION: Status: ACTIVE | Noted: 2020-10-28

## 2021-08-18 PROBLEM — F41.9 ANXIETY AND DEPRESSION: Status: ACTIVE | Noted: 2020-10-28

## 2021-08-18 PROBLEM — N93.9 ABNORMAL UTERINE BLEEDING: Status: ACTIVE | Noted: 2020-10-29

## 2021-08-18 LAB
ANION GAP SERPL CALC-SCNC: 4 MMOL/L (ref 7–16)
BASOPHILS # BLD: 0 K/UL (ref 0–0.2)
BASOPHILS NFR BLD: 0 % (ref 0–2)
BUN SERPL-MCNC: 7 MG/DL (ref 6–23)
CALCIUM SERPL-MCNC: 9.2 MG/DL (ref 8.3–10.4)
CHLORIDE SERPL-SCNC: 107 MMOL/L (ref 98–107)
CO2 SERPL-SCNC: 27 MMOL/L (ref 21–32)
CREAT SERPL-MCNC: 0.62 MG/DL (ref 0.6–1)
DIFFERENTIAL METHOD BLD: ABNORMAL
EOSINOPHIL # BLD: 0.1 K/UL (ref 0–0.8)
EOSINOPHIL NFR BLD: 1 % (ref 0.5–7.8)
ERYTHROCYTE [DISTWIDTH] IN BLOOD BY AUTOMATED COUNT: 11.9 % (ref 11.9–14.6)
GLUCOSE SERPL-MCNC: 91 MG/DL (ref 65–100)
HCG SERPL-ACNC: ABNORMAL MIU/ML (ref 0–6)
HCG UR QL: POSITIVE
HCT VFR BLD AUTO: 39 % (ref 35.8–46.3)
HGB BLD-MCNC: 13.5 G/DL (ref 11.7–15.4)
IMM GRANULOCYTES # BLD AUTO: 0 K/UL (ref 0–0.5)
IMM GRANULOCYTES NFR BLD AUTO: 0 % (ref 0–5)
LYMPHOCYTES # BLD: 2.1 K/UL (ref 0.5–4.6)
LYMPHOCYTES NFR BLD: 24 % (ref 13–44)
MCH RBC QN AUTO: 30.8 PG (ref 26.1–32.9)
MCHC RBC AUTO-ENTMCNC: 34.6 G/DL (ref 31.4–35)
MCV RBC AUTO: 89 FL (ref 79.6–97.8)
MONOCYTES # BLD: 0.6 K/UL (ref 0.1–1.3)
MONOCYTES NFR BLD: 6 % (ref 4–12)
NEUTS SEG # BLD: 6 K/UL (ref 1.7–8.2)
NEUTS SEG NFR BLD: 69 % (ref 43–78)
NRBC # BLD: 0 K/UL (ref 0–0.2)
PLATELET # BLD AUTO: 274 K/UL (ref 150–450)
PMV BLD AUTO: 8.6 FL (ref 9.4–12.3)
POTASSIUM SERPL-SCNC: 3.7 MMOL/L (ref 3.5–5.1)
RBC # BLD AUTO: 4.38 M/UL (ref 4.05–5.2)
SODIUM SERPL-SCNC: 138 MMOL/L (ref 136–145)
WBC # BLD AUTO: 8.7 K/UL (ref 4.3–11.1)

## 2021-08-18 PROCEDURE — 74011250636 HC RX REV CODE- 250/636: Performed by: OBSTETRICS & GYNECOLOGY

## 2021-08-18 PROCEDURE — 74011250637 HC RX REV CODE- 250/637: Performed by: OBSTETRICS & GYNECOLOGY

## 2021-08-18 PROCEDURE — 84702 CHORIONIC GONADOTROPIN TEST: CPT

## 2021-08-18 PROCEDURE — 74011000250 HC RX REV CODE- 250: Performed by: EMERGENCY MEDICINE

## 2021-08-18 PROCEDURE — 81025 URINE PREGNANCY TEST: CPT

## 2021-08-18 PROCEDURE — 96374 THER/PROPH/DIAG INJ IV PUSH: CPT

## 2021-08-18 PROCEDURE — 80048 BASIC METABOLIC PNL TOTAL CA: CPT

## 2021-08-18 PROCEDURE — 99218 HC RM OBSERVATION: CPT

## 2021-08-18 PROCEDURE — 99284 EMERGENCY DEPT VISIT MOD MDM: CPT

## 2021-08-18 PROCEDURE — 96375 TX/PRO/DX INJ NEW DRUG ADDON: CPT

## 2021-08-18 PROCEDURE — 74011250636 HC RX REV CODE- 250/636: Performed by: EMERGENCY MEDICINE

## 2021-08-18 PROCEDURE — 85025 COMPLETE CBC W/AUTO DIFF WBC: CPT

## 2021-08-18 RX ORDER — ONDANSETRON 2 MG/ML
4 INJECTION INTRAMUSCULAR; INTRAVENOUS
Status: COMPLETED | OUTPATIENT
Start: 2021-08-18 | End: 2021-08-18

## 2021-08-18 RX ORDER — ACETAMINOPHEN 500 MG
1000 TABLET ORAL
Status: DISCONTINUED | OUTPATIENT
Start: 2021-08-18 | End: 2021-08-20 | Stop reason: HOSPADM

## 2021-08-18 RX ORDER — MORPHINE SULFATE 2 MG/ML
2 INJECTION, SOLUTION INTRAMUSCULAR; INTRAVENOUS
Status: COMPLETED | OUTPATIENT
Start: 2021-08-18 | End: 2021-08-18

## 2021-08-18 RX ORDER — DEXTROSE, SODIUM CHLORIDE, SODIUM LACTATE, POTASSIUM CHLORIDE, AND CALCIUM CHLORIDE 5; .6; .31; .03; .02 G/100ML; G/100ML; G/100ML; G/100ML; G/100ML
200 INJECTION, SOLUTION INTRAVENOUS CONTINUOUS
Status: DISCONTINUED | OUTPATIENT
Start: 2021-08-18 | End: 2021-08-20 | Stop reason: HOSPADM

## 2021-08-18 RX ORDER — SODIUM CHLORIDE 0.9 % (FLUSH) 0.9 %
5-10 SYRINGE (ML) INJECTION EVERY 8 HOURS
Status: DISCONTINUED | OUTPATIENT
Start: 2021-08-18 | End: 2021-08-20 | Stop reason: HOSPADM

## 2021-08-18 RX ORDER — SODIUM CHLORIDE 0.9 % (FLUSH) 0.9 %
5-10 SYRINGE (ML) INJECTION AS NEEDED
Status: DISCONTINUED | OUTPATIENT
Start: 2021-08-18 | End: 2021-08-20 | Stop reason: HOSPADM

## 2021-08-18 RX ADMIN — MORPHINE SULFATE 2 MG: 2 INJECTION, SOLUTION INTRAMUSCULAR; INTRAVENOUS at 21:39

## 2021-08-18 RX ADMIN — Medication 10 ML: at 23:46

## 2021-08-18 RX ADMIN — SODIUM CHLORIDE, SODIUM LACTATE, POTASSIUM CHLORIDE, CALCIUM CHLORIDE, AND DEXTROSE MONOHYDRATE 200 ML/HR: 600; 310; 30; 20; 5 INJECTION, SOLUTION INTRAVENOUS at 23:03

## 2021-08-18 RX ADMIN — PROMETHAZINE HYDROCHLORIDE 12.5 MG: 25 INJECTION INTRAMUSCULAR; INTRAVENOUS at 21:39

## 2021-08-18 RX ADMIN — SODIUM CHLORIDE 1000 ML: 900 INJECTION, SOLUTION INTRAVENOUS at 21:37

## 2021-08-18 RX ADMIN — ONDANSETRON 4 MG: 2 INJECTION INTRAMUSCULAR; INTRAVENOUS at 21:39

## 2021-08-18 RX ADMIN — FAMOTIDINE 20 MG: 10 INJECTION, SOLUTION INTRAVENOUS at 23:47

## 2021-08-18 RX ADMIN — ACETAMINOPHEN 1000 MG: 500 TABLET, FILM COATED ORAL at 23:49

## 2021-08-18 NOTE — ED TRIAGE NOTES
Patient advises 5th pregnancy with 1 live birth of twins. Patient advises with lower abdominal cramping into her back, headache, nausea and vomiting. Patient seen at [facility] at this time. Patient advises she still just cant keep anything down. Denies any vaginal bleeding at this time.

## 2021-08-19 ENCOUNTER — APPOINTMENT (OUTPATIENT)
Dept: ULTRASOUND IMAGING | Age: 36
End: 2021-08-19
Attending: OBSTETRICS & GYNECOLOGY
Payer: COMMERCIAL

## 2021-08-19 LAB
APPEARANCE UR: ABNORMAL
BACTERIA URNS QL MICRO: ABNORMAL /HPF
BILIRUB UR QL: NEGATIVE
CASTS URNS QL MICRO: ABNORMAL /LPF
COLOR UR: YELLOW
EPI CELLS #/AREA URNS HPF: ABNORMAL /HPF
GLUCOSE UR STRIP.AUTO-MCNC: NEGATIVE MG/DL
HGB UR QL STRIP: NEGATIVE
KETONES UR QL STRIP.AUTO: NEGATIVE MG/DL
LEUKOCYTE ESTERASE UR QL STRIP.AUTO: ABNORMAL
NITRITE UR QL STRIP.AUTO: NEGATIVE
PH UR STRIP: 7 [PH] (ref 5–9)
PROT UR STRIP-MCNC: NEGATIVE MG/DL
RBC #/AREA URNS HPF: ABNORMAL /HPF
SP GR UR REFRACTOMETRY: 1 (ref 1–1.02)
UROBILINOGEN UR QL STRIP.AUTO: 0.2 EU/DL (ref 0.2–1)
WBC URNS QL MICRO: ABNORMAL /HPF

## 2021-08-19 PROCEDURE — 99220 PR INITIAL OBSERVATION CARE/DAY 70 MINUTES: CPT | Performed by: OBSTETRICS & GYNECOLOGY

## 2021-08-19 PROCEDURE — 81001 URINALYSIS AUTO W/SCOPE: CPT

## 2021-08-19 PROCEDURE — 74011250637 HC RX REV CODE- 250/637: Performed by: OBSTETRICS & GYNECOLOGY

## 2021-08-19 PROCEDURE — 74011000250 HC RX REV CODE- 250: Performed by: OBSTETRICS & GYNECOLOGY

## 2021-08-19 PROCEDURE — 2709999900 HC NON-CHARGEABLE SUPPLY

## 2021-08-19 PROCEDURE — 96376 TX/PRO/DX INJ SAME DRUG ADON: CPT

## 2021-08-19 PROCEDURE — 76815 OB US LIMITED FETUS(S): CPT

## 2021-08-19 PROCEDURE — 99218 HC RM OBSERVATION: CPT

## 2021-08-19 PROCEDURE — 74011250636 HC RX REV CODE- 250/636: Performed by: OBSTETRICS & GYNECOLOGY

## 2021-08-19 RX ADMIN — Medication 10 ML: at 05:08

## 2021-08-19 RX ADMIN — SODIUM CHLORIDE, SODIUM LACTATE, POTASSIUM CHLORIDE, CALCIUM CHLORIDE, AND DEXTROSE MONOHYDRATE 200 ML/HR: 600; 310; 30; 20; 5 INJECTION, SOLUTION INTRAVENOUS at 04:24

## 2021-08-19 RX ADMIN — PROMETHAZINE HYDROCHLORIDE 25 MG: 25 INJECTION INTRAMUSCULAR; INTRAVENOUS at 17:51

## 2021-08-19 RX ADMIN — PROMETHAZINE HYDROCHLORIDE 25 MG: 25 INJECTION INTRAMUSCULAR; INTRAVENOUS at 04:25

## 2021-08-19 RX ADMIN — PROMETHAZINE HYDROCHLORIDE 25 MG: 25 INJECTION INTRAMUSCULAR; INTRAVENOUS at 23:03

## 2021-08-19 RX ADMIN — Medication 10 ML: at 21:07

## 2021-08-19 RX ADMIN — ACETAMINOPHEN 1000 MG: 500 TABLET, FILM COATED ORAL at 11:51

## 2021-08-19 RX ADMIN — ACETAMINOPHEN 1000 MG: 500 TABLET, FILM COATED ORAL at 20:36

## 2021-08-19 NOTE — PROGRESS NOTES
Care Management Interventions  PCP Verified by CM: Yes  Mode of Transport at Discharge: Self  Discharge Durable Medical Equipment: No  Physical Therapy Consult: No  Occupational Therapy Consult: No  Discharge Location  Discharge Placement: Home    Chart screened by  for discharge planning. No needs identified at this time. Please consult  if any new issues arise.

## 2021-08-19 NOTE — PROGRESS NOTES
Problem: Falls - Risk of  Goal: *Absence of Falls  Description: Document Nirali Joe Fall Risk and appropriate interventions in the flowsheet.   Outcome: Progressing Towards Goal  Note: Fall Risk Interventions:            Medication Interventions: Teach patient to arise slowly, Patient to call before getting OOB, Evaluate medications/consider consulting pharmacy, Bed/chair exit alarm

## 2021-08-19 NOTE — PROGRESS NOTES
Patient tolerated breakfast with no nausea or vomiting. Pt states \"I don't feel nauseous, my stomach just feels weird. \" Pt complains of pain and received tylenol.

## 2021-08-19 NOTE — PROGRESS NOTES
Called by Dr Sabrina Flores felt like pt needed to be admitted for hyperemesis  Recent Results (from the past 24 hour(s))   CBC WITH AUTOMATED DIFF    Collection Time: 08/18/21  7:39 PM   Result Value Ref Range    WBC 8.7 4.3 - 11.1 K/uL    RBC 4.38 4.05 - 5.2 M/uL    HGB 13.5 11.7 - 15.4 g/dL    HCT 39.0 35.8 - 46.3 %    MCV 89.0 79.6 - 97.8 FL    MCH 30.8 26.1 - 32.9 PG    MCHC 34.6 31.4 - 35.0 g/dL    RDW 11.9 11.9 - 14.6 %    PLATELET 245 493 - 134 K/uL    MPV 8.6 (L) 9.4 - 12.3 FL    ABSOLUTE NRBC 0.00 0.0 - 0.2 K/uL    DF AUTOMATED      NEUTROPHILS 69 43 - 78 %    LYMPHOCYTES 24 13 - 44 %    MONOCYTES 6 4.0 - 12.0 %    EOSINOPHILS 1 0.5 - 7.8 %    BASOPHILS 0 0.0 - 2.0 %    IMMATURE GRANULOCYTES 0 0.0 - 5.0 %    ABS. NEUTROPHILS 6.0 1.7 - 8.2 K/UL    ABS. LYMPHOCYTES 2.1 0.5 - 4.6 K/UL    ABS. MONOCYTES 0.6 0.1 - 1.3 K/UL    ABS. EOSINOPHILS 0.1 0.0 - 0.8 K/UL    ABS. BASOPHILS 0.0 0.0 - 0.2 K/UL    ABS. IMM.  GRANS. 0.0 0.0 - 0.5 K/UL   METABOLIC PANEL, BASIC    Collection Time: 08/18/21  7:39 PM   Result Value Ref Range    Sodium 138 136 - 145 mmol/L    Potassium 3.7 3.5 - 5.1 mmol/L    Chloride 107 98 - 107 mmol/L    CO2 27 21 - 32 mmol/L    Anion gap 4 (L) 7 - 16 mmol/L    Glucose 91 65 - 100 mg/dL    BUN 7 6 - 23 MG/DL    Creatinine 0.62 0.6 - 1.0 MG/DL    GFR est AA >60 >60 ml/min/1.73m2    GFR est non-AA >60 >60 ml/min/1.73m2    Calcium 9.2 8.3 - 10.4 MG/DL   BETA HCG, QT    Collection Time: 08/18/21  7:39 PM   Result Value Ref Range    Beta HCG, QT 48,171 (H) 0.0 - 6.0 MIU/ML   HCG URINE, QL. - POC    Collection Time: 08/18/21  8:35 PM   Result Value Ref Range    Pregnancy test,urine (POC) Positive (A) NEG     ER doctor state ketones in urine  Will admit to observation and hydrate overnight reassess in am

## 2021-08-19 NOTE — PROGRESS NOTES
Checked on patient 1 hour ago; no U/S yet, expected around 7pm. No ketones now on urine dip. She tried to eat a salad and this did not go well, asking for phenergan.  Will plan on staying the night, maybe home am.

## 2021-08-19 NOTE — ED NOTES
TRANSFER - OUT REPORT:    Verbal report given to Olivia Méndez on [facility]  being transferred to [facility] for routine progression of care       Report consisted of patients Situation, Background, Assessment and   Recommendations(SBAR). Information from the following report(s) ED Summary, Intake/Output, MAR and Recent Results was reviewed with the receiving nurse. Lines:   Peripheral IV 08/18/21 Left Antecubital (Active)   Site Assessment Clean, dry, & intact 08/18/21 1941   Phlebitis Assessment 0 08/18/21 1941   Infiltration Assessment 0 08/18/21 1941   Dressing Status Clean, dry, & intact 08/18/21 1941   Dressing Type Transparent;Tape 08/18/21 1941   Hub Color/Line Status Pink;Flushed;Patent 08/18/21 1941   Action Taken Blood drawn 08/18/21 1941        Opportunity for questions and clarification was provided.       Patient transported with:   Registered Nurse

## 2021-08-19 NOTE — H&P
Admission H an PAnte Partum High Risk Pregnancy Note    PATIENT: Marin Haney  MRN: [id number]      Patient admitted for hyperemesis states she does not  have  headache  and abdominal pain  . States feels better no current nausea- was able to eat [place] toast this am   Likely got overly dehydrated  Has only voided once  Will check ketones and us and if cleared will d/c home with phenergan suppositories    Vitals: Temp (24hrs), Av.7 °F (37.1 °C), Min:98.1 °F (36.7 °C), Max:99.2 °F (37.3 °C)   Patient Vitals for the past 24 hrs:   BP   21 0715 103/64   21 0313 93/62   21 2227 114/61   21 2205 104/64   21 1905 107/66       I&O:   No intake/output data recorded. No intake/output data recorded. Exam:  Patient with mild distress- states just exhausted               Abdomen: soft, non-tender               Fundus: soft and non tender                          Labs:   Recent Results (from the past 24 hour(s))   CBC WITH AUTOMATED DIFF    Collection Time: 21  7:39 PM   Result Value Ref Range    WBC 8.7 4.3 - 11.1 K/uL    RBC 4.38 4.05 - 5.2 M/uL    HGB 13.5 11.7 - 15.4 g/dL    HCT 39.0 35.8 - 46.3 %    MCV 89.0 79.6 - 97.8 FL    MCH 30.8 26.1 - 32.9 PG    MCHC 34.6 31.4 - 35.0 g/dL    RDW 11.9 11.9 - 14.6 %    PLATELET 073 214 - 913 K/uL    MPV 8.6 (L) 9.4 - 12.3 FL    ABSOLUTE NRBC 0.00 0.0 - 0.2 K/uL    DF AUTOMATED      NEUTROPHILS 69 43 - 78 %    LYMPHOCYTES 24 13 - 44 %    MONOCYTES 6 4.0 - 12.0 %    EOSINOPHILS 1 0.5 - 7.8 %    BASOPHILS 0 0.0 - 2.0 %    IMMATURE GRANULOCYTES 0 0.0 - 5.0 %    ABS. NEUTROPHILS 6.0 1.7 - 8.2 K/UL    ABS. LYMPHOCYTES 2.1 0.5 - 4.6 K/UL    ABS. MONOCYTES 0.6 0.1 - 1.3 K/UL    ABS. EOSINOPHILS 0.1 0.0 - 0.8 K/UL    ABS. BASOPHILS 0.0 0.0 - 0.2 K/UL    ABS. IMM.  GRANS. 0.0 0.0 - 0.5 K/UL   METABOLIC PANEL, BASIC    Collection Time: 21  7:39 PM   Result Value Ref Range    Sodium 138 136 - 145 mmol/L    Potassium 3.7 3.5 - 5.1 mmol/L    Chloride 107 98 - 107 mmol/L    CO2 27 21 - 32 mmol/L    Anion gap 4 (L) 7 - 16 mmol/L    Glucose 91 65 - 100 mg/dL    BUN 7 6 - 23 MG/DL    Creatinine 0.62 0.6 - 1.0 MG/DL    GFR est AA >60 >60 ml/min/1.73m2    GFR est non-AA >60 >60 ml/min/1.73m2    Calcium 9.2 8.3 - 10.4 MG/DL   BETA HCG, QT    Collection Time: 08/18/21  7:39 PM   Result Value Ref Range    Beta HCG, QT 48,171 (H) 0.0 - 6.0 MIU/ML   HCG URINE, QL. - POC    Collection Time: 08/18/21  8:35 PM   Result Value Ref Range    Pregnancy test,urine (POC) Positive (A) NEG         Assessment and Plan:      Principal Problem:    Hyperemesis (8/18/2021)          Hyperemesis Gravidarum:  Aggressive intravenous hydration  Check urine for ketones until trace results   Check us last time with hyperemesis had twins  Continue aggressive hydration if ketones cleared will d/c home

## 2021-08-19 NOTE — PROGRESS NOTES
TRANSFER - IN REPORT:    Verbal report received from [facility] on [facility]  being received from ED for routine progression of care      Report consisted of patients Situation, Background, Assessment and   Recommendations(SBAR). Information from the following report(s) SBAR, Kardex, ED Summary, Intake/Output, MAR and Med Rec Status was reviewed with the receiving nurse. Opportunity for questions and clarification was provided.

## 2021-08-19 NOTE — ED PROVIDER NOTES
AB x1 miscarriage x1 at 7 weeks gestation presents with a week of nausea vomiting diarrhea. Patient of [facility] she was seen there the office today and told to come to the ER here for admission and for a Zofran pump. Patient has been using oral Phenergan, Phenergan suppositories, and Zofran ODT to no avail, despite this significant antiemetic regimen she continues throwing up 6-7 times a day, emesis is yellow nonbloody. There is no diarrhea to accompany the vomiting. No past medical history on file. No past surgical history on file. No family history on file. Social History     Socioeconomic History    Marital status: SINGLE     Spouse name: Not on file    Number of children: Not on file    Years of education: Not on file    Highest education level: Not on file   Occupational History    Not on file   Tobacco Use    Smoking status: Not on file   Substance and Sexual Activity    Alcohol use: Not on file    Drug use: Not on file    Sexual activity: Not on file   Other Topics Concern    Not on file   Social History Narrative    Not on file     Social Determinants of Health     Financial Resource Strain:     Difficulty of Paying Living Expenses:    Food Insecurity:     Worried About Running Out of Food in the Last Year:     [address] in the Last Year:    Transportation Needs:     Lack of Transportation (Medical):      Lack of Transportation (Non-Medical):    Physical Activity:     Days of Exercise per Week:     Minutes of Exercise per Session:    Stress:     Feeling of Stress :    Social Connections:     Frequency of Communication with Friends and Family:     Frequency of Social Gatherings with Friends and Family:     Attends Christian Services:     Active Member of Clubs or Organizations:     Attends Club or Organization Meetings:     Marital Status:    Intimate Partner Violence:     Fear of Current or Ex-Partner:     Emotionally Abused:     Physically Abused:     Sexually Abused: ALLERGIES: Reglan [metoclopramide]    Review of Systems   Constitutional: Negative for chills and fever. HENT: Negative for rhinorrhea and sore throat. Eyes: Negative for discharge and redness. Respiratory: Negative for cough and shortness of breath. Cardiovascular: Negative for chest pain and palpitations. Gastrointestinal: Positive for nausea and vomiting. Negative for abdominal pain and diarrhea. Genitourinary: Negative for difficulty urinating, dysuria and vaginal bleeding. Musculoskeletal: Negative for arthralgias and back pain. Skin: Negative for rash. Neurological: Positive for dizziness and light-headedness. Negative for headaches. All other systems reviewed and are negative. Vitals:    08/18/21 1905   BP: 107/66   Pulse: 94   Resp: 16   Temp: 99.2 °F (37.3 °C)   SpO2: 100%   Weight: 67.6 kg (149 lb)   Height: 5' 3\" (1.6 m)            Physical Exam  Vitals and nursing note reviewed. Constitutional:       General: She is not in acute distress. Appearance: Normal appearance. She is well-developed. She is ill-appearing. She is not toxic-appearing or diaphoretic. HENT:      Head: Normocephalic and atraumatic. Right Ear: External ear normal.      Left Ear: External ear normal.   Eyes:      General: No scleral icterus. Right eye: No discharge. Left eye: No discharge. Extraocular Movements: Extraocular movements intact. Conjunctiva/sclera: Conjunctivae normal.      Pupils: Pupils are equal, round, and reactive to light. Neck:      Thyroid: No thyromegaly. Trachea: Trachea normal.   Cardiovascular:      Rate and Rhythm: Normal rate and regular rhythm. Heart sounds: Normal heart sounds. No murmur heard. No gallop. Pulmonary:      Effort: Pulmonary effort is normal. No respiratory distress. Breath sounds: Normal breath sounds. No wheezing or rales.    Abdominal:      Palpations: Abdomen is soft. There is no hepatomegaly, splenomegaly or pulsatile mass. Tenderness: There is no abdominal tenderness. There is no guarding. Musculoskeletal:         General: Normal range of motion. Cervical back: Normal range of motion and neck supple. Normal range of motion. Lymphadenopathy:      Cervical: No cervical adenopathy. Skin:     General: Skin is warm and dry. Neurological:      General: No focal deficit present. Mental Status: She is alert and oriented to person, place, and time. Mental status is at baseline. Motor: No abnormal muscle tone. Comments: cni 2-12 grossly   Psychiatric:         Mood and Affect: Mood normal.         Behavior: Behavior normal.          MDM  Number of Diagnoses or Management Options  Diagnosis management comments: Medical decision making note:  First semester pregnancy with hyperemesis gravidarum failing 3 antiemetics at home. Will admit to the OB service, start IV hydration, urine shows 80 ketones and a specific gravity of over 1.030  This concludes the \"medical decision making note\" part of this emergency department visit note.          Amount and/or Complexity of Data Reviewed  Clinical lab tests: reviewed and ordered  Decide to obtain previous medical records or to obtain history from someone other than the patient: yes  Discuss the patient with other providers: yes (Dr callaway)    Risk of Complications, Morbidity, and/or Mortality  Presenting problems: moderate  Diagnostic procedures: low  Management options: low    Patient Progress  Patient progress: improved         Procedures

## 2021-08-20 VITALS
HEART RATE: 76 BPM | RESPIRATION RATE: 18 BRPM | TEMPERATURE: 98.8 F | OXYGEN SATURATION: 98 % | HEIGHT: 63 IN | DIASTOLIC BLOOD PRESSURE: 59 MMHG | SYSTOLIC BLOOD PRESSURE: 95 MMHG | WEIGHT: 149 LBS | BODY MASS INDEX: 26.4 KG/M2

## 2021-08-20 PROCEDURE — 99218 HC RM OBSERVATION: CPT

## 2021-08-20 PROCEDURE — 99217 PR OBSERVATION CARE DISCHARGE MANAGEMENT: CPT | Performed by: OBSTETRICS & GYNECOLOGY

## 2021-08-20 RX ORDER — PROMETHAZINE HYDROCHLORIDE 12.5 MG/1
12.5 SUPPOSITORY RECTAL
Qty: 20 SUPPOSITORY | Refills: 1 | Status: SHIPPED | OUTPATIENT
Start: 2021-08-20 | End: 2021-08-27

## 2021-08-20 RX ADMIN — Medication 10 ML: at 05:13

## 2021-08-20 NOTE — PROGRESS NOTES
Problem: Falls - Risk of  Goal: *Absence of Falls  Description: Document Millicent Del Rosario Fall Risk and appropriate interventions in the flowsheet.   Outcome: Progressing Towards Goal  Note: Fall Risk Interventions:  Mobility Interventions: Bed/chair exit alarm, Utilize walker, cane, or other assistive device, Patient to call before getting OOB, PT Consult for mobility concerns    Mentation Interventions: Evaluate medications/consider consulting pharmacy, Bed/chair exit alarm, Adequate sleep, hydration, pain control, Increase mobility, More frequent rounding, Toileting rounds, Room close to nurse's station, Eyeglasses and hearing aids    Medication Interventions: Patient to call before getting OOB, Teach patient to arise slowly, Evaluate medications/consider consulting pharmacy, Bed/chair exit alarm    Elimination Interventions: Stay With Me (per policy), Toilet paper/wipes in reach, Toileting schedule/hourly rounds, Elevated toilet seat, Call light in reach    History of Falls Interventions: Door open when patient unattended, Consult care management for discharge planning, Investigate reason for fall, Evaluate medications/consider consulting pharmacy, Room close to nurse's station

## 2021-08-20 NOTE — PROGRESS NOTES
Ante Partum High Risk Pregnancy Note    Patient admitted for hyperemesis gravidarum states she feels a little better. Vitals: Temp (24hrs), Av.3 °F (36.8 °C), Min:97.3 °F (36.3 °C), Max:99.3 °F (37.4 °C)   Patient Vitals for the past 24 hrs:   BP   21 0732 (!) 95/59   21 0320 110/66   21 2335 107/64   21 1805 108/71   21 1526 106/67   21 1128 (!) 109/57       I&O:   No intake/output data recorded. No intake/output data recorded. Exam:  Patient without distress. Heart rrr  Lungs cta b&s                Abdomen: soft, non-tender               Fundus: soft and non tender                            Lower Extremities: No                 Us with 7 week IUP with +fca           Lab/Data Review: All lab results for the last 24 hours reviewed. Assessment and Plan:      Principal Problem:    Hyperemesis (2021)          Hyperemesis Gravidarum:  pt will go home with phenergan suppositories.

## 2021-08-20 NOTE — DISCHARGE SUMMARY
Antepartum Discharge Summary     Name: Kamini Thao MRN: [id number]  SSN: [id number]    YOB: 1985  Age: 37 y.o. Sex: female      Allergies: Reglan [metoclopramide]    Admit Date: 8/18/2021    Discharge Date: 8/20/2021     Admitting Physician: Edith Messer DO     Attending Physician:  Trixie Pryor, *     * Admission Diagnoses: Hyperemesis [R11.10]    * Discharge Diagnoses:   Hospital Problems as of 8/20/2021 Date Reviewed: 8/19/2021        Codes Class Noted - Resolved POA    * (Principal) Hyperemesis ICD-10-CM: R11.10  ICD-9-CM: 536.2  8/18/2021 - Present Unknown           No results found for: ABORH, RUBELLAEXT, GRBSEXT, ABORHEXT, RUBELLAEXT, GRBSEXT   There is no immunization history on file for this patient. * Discharge Condition: good    * Procedures: iv fluids, us and antiemetic therapy     * Hospital Course:    - Hyperemesis Gravidarum:                              - Patient admitted with nausea; improved past aggressive hydration and antiemetic therapy. * Disposition: Home    Discharge Medications:   Current Discharge Medication List      START taking these medications    Details   promethazine (PHENERGAN) 12.5 mg suppository Insert 1 Suppository into rectum every six (6) hours as needed for Nausea for up to 7 days. Qty: 20 Suppository, Refills: 1  Start date: 8/20/2021, End date: 8/27/2021             * Follow-up Care/Patient Instructions:   Activity: Activity as tolerated  Diet: bland and advance as tolerated   Wound Care: None needed    Follow-up Information     Follow up With Specialties Details Why Contact Info    Anthony Marte MD Family Medicine   [address] [place] [address]  [phone]

## 2021-08-20 NOTE — DISCHARGE INSTRUCTIONS
Patient Education        Extreme Nausea and Vomiting in Pregnancy: Care Instructions  Your Care Instructions     Nausea and vomiting (often called morning sickness) are common in pregnancy. They are caused by pregnancy hormones and happen most often in the first 3 months. Some women get very sick and are not able to keep down food and fluids. This extreme morning sickness is called hyperemesis gravidarum. It can lead to a dangerous loss of fluids in the body. It also can keep you from gaining weight and getting proper nutrition during your pregnancy. Your body fluids are put back in balance with water and minerals called electrolytes. Medicine may help if you have severe nausea and vomiting. Follow-up care is a key part of your treatment and safety. Be sure to make and go to all appointments, and call your doctor if you are having problems. It's also a good idea to know your test results and keep a list of the medicines you take. How can you care for yourself at home? · Take your medicines exactly as prescribed. Call your doctor if you think you are having a problem with your medicine. · Drink plenty of fluids to prevent dehydration. Choose water and other caffeine-free clear liquids until you feel better. Try sipping on sports drinks that have salt and sugar in them. · Eat a small snack, such as crackers, before you get out of bed. Wait a few minutes, then get out of bed slowly. · Keep food in your stomach, but not too much at once. An empty stomach can make nausea worse. Eat several small meals every day instead of three large meals. · Eat more protein and less fat. · Get plenty of vitamin B6 by eating whole grains, nuts, seeds, and legumes. You can take vitamin B6 tablets if your doctor says it is okay. · Try to avoid smells and foods that make you feel sick to your stomach. · Get lots of rest.  · You may want to try acupressure bands.  They put pressure on an acupressure point in the wrist. Some women feel better using the bands. · Miranda may also help you feel better. You can use it in tea, take it as a pill, or use a miranda syrup that you can buy at a health food store. When should you call for help? Call 911 anytime you think you may need emergency care. For example, call if:    · You passed out (lost consciousness). Call your doctor now or seek immediate medical care if:    · You are sick to your stomach or cannot drink fluids.     · You have symptoms of dehydration, such as:  ? Dry eyes and a dry mouth. ? Passing only a little urine. ? Feeling thirstier than usual.     · You are not able to keep down your medicines.     · You have pain in your belly or pelvis. Watch closely for changes in your health, and be sure to contact your doctor if:    · You do not get better as expected. Where can you learn more? Go to http://www.gray.com/  Enter F398 in the search box to learn more about \"Extreme Nausea and Vomiting in Pregnancy: Care Instructions. \"  Current as of: October 8, 2020               Content Version: 12.8  © 5090-6037 [employer]. Care instructions adapted under license by [employer] (which disclaims liability or warranty for this information). If you have questions about a medical condition or this instruction, always ask your healthcare professional. [place] [address] any warranty or liability for your use of this information.

## 2021-09-09 PROBLEM — O26.20 ABORTER, HABITUAL, ANTEPARTUM: Status: ACTIVE | Noted: 2021-09-09

## 2021-09-09 PROBLEM — N93.9 ABNORMAL UTERINE BLEEDING: Status: RESOLVED | Noted: 2020-10-29 | Resolved: 2021-09-09

## 2021-09-09 PROBLEM — F32.A ANXIETY AND DEPRESSION: Status: RESOLVED | Noted: 2020-10-28 | Resolved: 2021-09-09

## 2021-09-09 PROBLEM — F41.9 ANXIETY AND DEPRESSION: Status: RESOLVED | Noted: 2020-10-28 | Resolved: 2021-09-09

## 2021-09-09 PROBLEM — Z23 ENCOUNTER FOR IMMUNIZATION: Status: ACTIVE | Noted: 2021-09-09

## 2021-09-09 PROBLEM — Z34.90 PREGNANCY: Status: ACTIVE | Noted: 2021-09-09

## 2021-09-09 PROBLEM — O34.29 HX OF UTERINE SURGERY AFFECTING CURRENT PREGNANCY: Status: ACTIVE | Noted: 2021-09-09

## 2021-09-09 PROBLEM — F17.200 CURRENT SMOKER ON SOME DAYS: Status: RESOLVED | Noted: 2020-10-28 | Resolved: 2021-09-09

## 2021-09-09 PROBLEM — Z01.411 ENCOUNTER FOR GYNECOLOGICAL EXAMINATION (GENERAL) (ROUTINE) WITH ABNORMAL FINDINGS: Status: RESOLVED | Noted: 2020-10-08 | Resolved: 2021-09-09

## 2021-09-09 PROBLEM — N89.8 VAGINAL DISCHARGE: Status: RESOLVED | Noted: 2020-10-29 | Resolved: 2021-09-09

## 2021-09-09 PROBLEM — Z83.79 FAMILY HISTORY OF CROHN'S DISEASE: Status: RESOLVED | Noted: 2019-10-16 | Resolved: 2021-09-09

## 2021-09-09 PROBLEM — J30.9 AR (ALLERGIC RHINITIS): Status: RESOLVED | Noted: 2019-10-16 | Resolved: 2021-09-09

## 2021-09-13 ENCOUNTER — HOSPITAL ENCOUNTER (EMERGENCY)
Age: 36
Discharge: HOME | End: 2021-09-13
Attending: EMERGENCY MEDICINE
Payer: COMMERCIAL

## 2021-09-13 VITALS
SYSTOLIC BLOOD PRESSURE: 107 MMHG | TEMPERATURE: 98.3 F | DIASTOLIC BLOOD PRESSURE: 77 MMHG | OXYGEN SATURATION: 99 % | RESPIRATION RATE: 16 BRPM | HEART RATE: 93 BPM

## 2021-09-13 DIAGNOSIS — Z34.92 SECOND TRIMESTER PREGNANCY: ICD-10-CM

## 2021-09-13 DIAGNOSIS — R11.2 NAUSEA AND VOMITING, INTRACTABILITY OF VOMITING NOT SPECIFIED, UNSPECIFIED VOMITING TYPE: Primary | ICD-10-CM

## 2021-09-13 PROBLEM — Z67.91 RH NEGATIVE STATUS DURING PREGNANCY: Status: ACTIVE | Noted: 2021-09-13

## 2021-09-13 PROBLEM — O26.899 RH NEGATIVE STATUS DURING PREGNANCY: Status: ACTIVE | Noted: 2021-09-13

## 2021-09-13 LAB
ALBUMIN SERPL-MCNC: 3.2 G/DL (ref 3.5–5)
ALBUMIN/GLOB SERPL: 0.8 {RATIO} (ref 1.2–3.5)
ALP SERPL-CCNC: 54 U/L (ref 50–130)
ALT SERPL-CCNC: 28 U/L (ref 12–65)
ANION GAP SERPL CALC-SCNC: 7 MMOL/L (ref 7–16)
AST SERPL-CCNC: 14 U/L (ref 15–37)
BACTERIA URNS QL MICRO: NORMAL /HPF
BASOPHILS # BLD: 0 K/UL (ref 0–0.2)
BASOPHILS NFR BLD: 0 % (ref 0–2)
BILIRUB SERPL-MCNC: 0.2 MG/DL (ref 0.2–1.1)
BUN SERPL-MCNC: 6 MG/DL (ref 6–23)
CALCIUM SERPL-MCNC: 9 MG/DL (ref 8.3–10.4)
CASTS URNS QL MICRO: 0 /LPF
CHLORIDE SERPL-SCNC: 106 MMOL/L (ref 98–107)
CO2 SERPL-SCNC: 24 MMOL/L (ref 21–32)
CREAT SERPL-MCNC: 0.48 MG/DL (ref 0.6–1)
DIFFERENTIAL METHOD BLD: ABNORMAL
EOSINOPHIL # BLD: 0.1 K/UL (ref 0–0.8)
EOSINOPHIL NFR BLD: 1 % (ref 0.5–7.8)
EPI CELLS #/AREA URNS HPF: NORMAL /HPF
ERYTHROCYTE [DISTWIDTH] IN BLOOD BY AUTOMATED COUNT: 12.1 % (ref 11.9–14.6)
GLOBULIN SER CALC-MCNC: 3.8 G/DL (ref 2.3–3.5)
GLUCOSE SERPL-MCNC: 98 MG/DL (ref 65–100)
HCT VFR BLD AUTO: 37.3 % (ref 35.8–46.3)
HGB BLD-MCNC: 13.1 G/DL (ref 11.7–15.4)
IMM GRANULOCYTES # BLD AUTO: 0 K/UL (ref 0–0.5)
IMM GRANULOCYTES NFR BLD AUTO: 0 % (ref 0–5)
LIPASE SERPL-CCNC: 90 U/L (ref 73–393)
LYMPHOCYTES # BLD: 1.7 K/UL (ref 0.5–4.6)
LYMPHOCYTES NFR BLD: 24 % (ref 13–44)
MCH RBC QN AUTO: 31.3 PG (ref 26.1–32.9)
MCHC RBC AUTO-ENTMCNC: 35.1 G/DL (ref 31.4–35)
MCV RBC AUTO: 89 FL (ref 79.6–97.8)
MONOCYTES # BLD: 0.5 K/UL (ref 0.1–1.3)
MONOCYTES NFR BLD: 7 % (ref 4–12)
NEUTS SEG # BLD: 4.9 K/UL (ref 1.7–8.2)
NEUTS SEG NFR BLD: 68 % (ref 43–78)
NRBC # BLD: 0 K/UL (ref 0–0.2)
PLATELET # BLD AUTO: 264 K/UL (ref 150–450)
PMV BLD AUTO: 8.9 FL (ref 9.4–12.3)
POTASSIUM SERPL-SCNC: 4 MMOL/L (ref 3.5–5.1)
PROT SERPL-MCNC: 7 G/DL (ref 6.3–8.2)
RBC # BLD AUTO: 4.19 M/UL (ref 4.05–5.2)
RBC #/AREA URNS HPF: NORMAL /HPF
SODIUM SERPL-SCNC: 137 MMOL/L (ref 136–145)
WBC # BLD AUTO: 7.2 K/UL (ref 4.3–11.1)
WBC URNS QL MICRO: NORMAL /HPF

## 2021-09-13 PROCEDURE — 81015 MICROSCOPIC EXAM OF URINE: CPT

## 2021-09-13 PROCEDURE — 96375 TX/PRO/DX INJ NEW DRUG ADDON: CPT

## 2021-09-13 PROCEDURE — 74011250636 HC RX REV CODE- 250/636: Performed by: PHYSICIAN ASSISTANT

## 2021-09-13 PROCEDURE — 96361 HYDRATE IV INFUSION ADD-ON: CPT

## 2021-09-13 PROCEDURE — 81003 URINALYSIS AUTO W/O SCOPE: CPT

## 2021-09-13 PROCEDURE — 99283 EMERGENCY DEPT VISIT LOW MDM: CPT

## 2021-09-13 PROCEDURE — 74011250636 HC RX REV CODE- 250/636: Performed by: EMERGENCY MEDICINE

## 2021-09-13 PROCEDURE — 87088 URINE BACTERIA CULTURE: CPT

## 2021-09-13 PROCEDURE — 85025 COMPLETE CBC W/AUTO DIFF WBC: CPT

## 2021-09-13 PROCEDURE — 96374 THER/PROPH/DIAG INJ IV PUSH: CPT

## 2021-09-13 PROCEDURE — 80053 COMPREHEN METABOLIC PANEL: CPT

## 2021-09-13 PROCEDURE — 74011000250 HC RX REV CODE- 250: Performed by: EMERGENCY MEDICINE

## 2021-09-13 PROCEDURE — 83690 ASSAY OF LIPASE: CPT

## 2021-09-13 PROCEDURE — 87086 URINE CULTURE/COLONY COUNT: CPT

## 2021-09-13 PROCEDURE — 74011250637 HC RX REV CODE- 250/637: Performed by: EMERGENCY MEDICINE

## 2021-09-13 RX ORDER — PROCHLORPERAZINE 25 MG
25 SUPPOSITORY, RECTAL RECTAL
Qty: 8 SUPPOSITORY | Refills: 1 | Status: SHIPPED | OUTPATIENT
Start: 2021-09-13 | End: 2021-09-20

## 2021-09-13 RX ORDER — ONDANSETRON 2 MG/ML
4 INJECTION INTRAMUSCULAR; INTRAVENOUS
Status: COMPLETED | OUTPATIENT
Start: 2021-09-13 | End: 2021-09-13

## 2021-09-13 RX ORDER — PROCHLORPERAZINE MALEATE 10 MG
10 TABLET ORAL
Qty: 8 TABLET | Refills: 1 | Status: SHIPPED | OUTPATIENT
Start: 2021-09-13 | End: 2021-11-11

## 2021-09-13 RX ORDER — DICYCLOMINE HYDROCHLORIDE 10 MG/1
20 CAPSULE ORAL
Status: COMPLETED | OUTPATIENT
Start: 2021-09-13 | End: 2021-09-13

## 2021-09-13 RX ADMIN — PROMETHAZINE HYDROCHLORIDE 12.5 MG: 25 INJECTION INTRAMUSCULAR; INTRAVENOUS at 15:44

## 2021-09-13 RX ADMIN — SODIUM CHLORIDE 1000 ML: 900 INJECTION, SOLUTION INTRAVENOUS at 11:23

## 2021-09-13 RX ADMIN — ONDANSETRON 4 MG: 2 INJECTION INTRAMUSCULAR; INTRAVENOUS at 11:23

## 2021-09-13 RX ADMIN — DICYCLOMINE HYDROCHLORIDE 20 MG: 10 CAPSULE ORAL at 15:44

## 2021-09-13 NOTE — ED PROVIDER NOTES
77-year-old female in her second trimester pregnancy with hyperemesis gravidarum presents with nausea and vomiting. She is approximately G 13 P2,0,10,2 (8 elective, 2 spontaneous). Throwing up at home despite sublingual Zofran, and oral tablet Zofran. She states that the Phenergan suppositories \"constipated\" her. Recently admitted here for nausea and vomiting from August 18 till August 20. Patient was supposed to have called to arrange a Zofran pump, but has not done so during the intervening 3 weeks. There is no diarrhea to accompany the vomiting, she does report some crampy abdominal pain. Patient states she is not able to void and has no urgency to do so.            Past Medical History:   Diagnosis Date    Chlamydia     GERD (gastroesophageal reflux disease) 10/16/2019    Gonorrhea     High cholesterol     as a child    [facility]    Post concussion syndrome 10/16/2019       Past Surgical History:   Procedure Laterality Date    HX DILATION AND CURETTAGE      x9         Family History:   Problem Relation Age of Onset    Diabetes Father     Breast Problems Other     Cancer Paternal Grandfather         Prostate CA    Crohn's Disease Sister     Seizures Sister     Thyroid Disease Paternal Grandmother        Social History     Socioeconomic History    Marital status: SINGLE     Spouse name: Not on file    Number of children: Not on file    Years of education: Not on file    Highest education level: Not on file   Occupational History    Not on file   Tobacco Use    Smoking status: Former Smoker    Smokeless tobacco: Never Used   Substance and Sexual Activity    Alcohol use: No    Drug use: No    Sexual activity: Yes     Partners: Male     Birth control/protection: None   Other Topics Concern    Not on file   Social History Narrative    Not on file     Social Determinants of Health     Financial Resource Strain:     Difficulty of Paying Living Expenses:    Food Insecurity:     Worried About [facility] in the Last Year:     [address] [facility] [address] in the Last Year:    Transportation Needs:     Lack of Transportation (Medical):  Lack of Transportation (Non-Medical):    Physical Activity:     Days of Exercise per Week:     Minutes of Exercise per Session:    Stress:     Feeling of Stress :    Social Connections:     Frequency of Communication with Friends and Family:     Frequency of Social Gatherings with Friends and Family:     Attends Caodaism Services:     Active Member of Clubs or Organizations:     Attends Club or Organization Meetings:     Marital Status:    Intimate Partner Violence:     Fear of Current or Ex-Partner:     Emotionally Abused:     Physically Abused:     Sexually Abused: ALLERGIES: Reglan [metoclopramide]    Review of Systems   Constitutional: Positive for activity change and appetite change. Negative for chills and fever. HENT: Negative for rhinorrhea and sore throat. Eyes: Negative for discharge and redness. Respiratory: Negative for cough and shortness of breath. Cardiovascular: Negative for chest pain and palpitations. Gastrointestinal: Positive for abdominal pain, nausea and vomiting. Negative for diarrhea. Genitourinary: Positive for decreased urine volume. Negative for dysuria. Musculoskeletal: Negative for arthralgias and back pain. Skin: Negative for rash. Neurological: Negative for dizziness and headaches. All other systems reviewed and are negative. Vitals:    09/13/21 1040   BP: 107/77   Pulse: 93   Resp: 16   Temp: 98.3 °F (36.8 °C)   SpO2: 99%            Physical Exam  Vitals and nursing note reviewed. Constitutional:       General: She is not in acute distress. Appearance: Normal appearance. She is well-developed. She is not ill-appearing, toxic-appearing or diaphoretic. HENT:      Head: Normocephalic and atraumatic.       Right Ear: External ear normal.      Left Ear: External ear normal.   Eyes:      General: No scleral icterus. Right eye: No discharge. Left eye: No discharge. Extraocular Movements: Extraocular movements intact. Conjunctiva/sclera: Conjunctivae normal.      Pupils: Pupils are equal, round, and reactive to light. Neck:      Thyroid: No thyromegaly. Trachea: Trachea normal.   Cardiovascular:      Rate and Rhythm: Normal rate and regular rhythm. Heart sounds: Normal heart sounds. No murmur heard. No gallop. Pulmonary:      Effort: Pulmonary effort is normal. No respiratory distress. Breath sounds: Normal breath sounds. No wheezing or rales. Abdominal:      Palpations: Abdomen is soft. There is no hepatomegaly, splenomegaly or pulsatile mass. Tenderness: There is no abdominal tenderness. There is no guarding. Musculoskeletal:         General: Normal range of motion. Cervical back: Normal range of motion and neck supple. Normal range of motion. Lymphadenopathy:      Cervical: No cervical adenopathy. Skin:     General: Skin is warm and dry. Neurological:      General: No focal deficit present. Mental Status: She is alert and oriented to person, place, and time. Mental status is at baseline. Motor: No abnormal muscle tone.       Comments: cni 2-12 grossly   Psychiatric:         Mood and Affect: Mood normal.         Behavior: Behavior normal.          MDM  Number of Diagnoses or Management Options  Nausea and vomiting, intractability of vomiting not specified, unspecified vomiting type  Second trimester pregnancy  Diagnosis management comments: Medical decision making note:  Nausea vomiting in pregnancy, lab work looks good, awaiting urine to check for ketones  Discussed with OB early in the ER evaluation for admission versus discharge, labs look good, if ketones are small to absent will discharge home at her follow-up for Zofran pump as previously arranged  This concludes the \"medical decision making note\" part of this emergency department visit note.          Amount and/or Complexity of Data Reviewed  Clinical lab tests: reviewed and ordered  Decide to obtain previous medical records or to obtain history from someone other than the patient: yes  Discuss the patient with other providers: yes (Dr. Osman Peters)           Procedures Detail Level: Detailed

## 2021-09-13 NOTE — DISCHARGE INSTRUCTIONS
Try the Compazine pills and/or the Compazine suppositories they should be less constipating than the Phenergan suppositories    Follow-up with your OB get in touch with the company for the Zofran pump as soon as you can

## 2021-09-13 NOTE — ED NOTES
Pt to er c/o continued nv for past several days, patient approximate 11 weeks pregnant G 11 P8, patient had ultrasound in office that show single IUP. Patient denies any abdominal bleeding but does have some cramping. Patient was to have called company for Zofran pump to be set up but she has not done this yet. She states she has not had any urine output since yesterday  Patient evaluated initially in triage. Rapid Medical Evaluation was conducted and necessary orders have been placed. I have performed a medical screening exam.  Care will now be transferred to the attending physician in the emergency department.   Moishe Dance, PA 10:45 AM

## 2021-09-13 NOTE — ED TRIAGE NOTES
Pt is  6 para 2/ c/o feeling dehydrated from vomiting/ pt is 11 weeks preg/ pt is suppose to get zofran pump from ob office

## 2021-09-13 NOTE — ED NOTES
I have reviewed discharge instructions with the patient. The patient verbalized understanding. Patient left ED via Discharge Method: ambulatory to Home with self. Opportunity for questions and clarification provided. Patient given 2 scripts. To continue your aftercare when you leave the hospital, you may receive an automated call from our care team to check in on how you are doing. This is a free service and part of our promise to provide the best care and service to meet your aftercare needs.  If you have questions, or wish to unsubscribe from this service please call [phone]. Thank you for Choosing our [facility] Emergency Department.

## 2021-09-18 LAB
BACTERIA SPEC CULT: ABNORMAL
BACTERIA SPEC CULT: ABNORMAL
SERVICE CMNT-IMP: ABNORMAL

## 2021-09-19 ENCOUNTER — HOSPITAL ENCOUNTER (EMERGENCY)
Age: 36
Discharge: HOME | End: 2021-09-19
Attending: EMERGENCY MEDICINE
Payer: COMMERCIAL

## 2021-09-19 VITALS
RESPIRATION RATE: 16 BRPM | SYSTOLIC BLOOD PRESSURE: 145 MMHG | HEART RATE: 108 BPM | OXYGEN SATURATION: 100 % | TEMPERATURE: 98 F | HEIGHT: 63 IN | BODY MASS INDEX: 26.04 KG/M2 | DIASTOLIC BLOOD PRESSURE: 100 MMHG

## 2021-09-19 DIAGNOSIS — O21.0 HYPEREMESIS GRAVIDARUM: Primary | ICD-10-CM

## 2021-09-19 DIAGNOSIS — N30.00 ACUTE CYSTITIS WITHOUT HEMATURIA: ICD-10-CM

## 2021-09-19 LAB
ALBUMIN SERPL-MCNC: 3.2 G/DL (ref 3.5–5)
ALBUMIN/GLOB SERPL: 0.8 {RATIO} (ref 1.2–3.5)
ALP SERPL-CCNC: 61 U/L (ref 50–136)
ALT SERPL-CCNC: 15 U/L (ref 12–65)
ANION GAP SERPL CALC-SCNC: 7 MMOL/L (ref 7–16)
AST SERPL-CCNC: 7 U/L (ref 15–37)
BACTERIA URNS QL MICRO: NORMAL /HPF
BASOPHILS # BLD: 0 K/UL (ref 0–0.2)
BASOPHILS NFR BLD: 0 % (ref 0–2)
BILIRUB SERPL-MCNC: 0.4 MG/DL (ref 0.2–1.1)
BUN SERPL-MCNC: 6 MG/DL (ref 6–23)
CALCIUM SERPL-MCNC: 9.8 MG/DL (ref 8.3–10.4)
CASTS URNS QL MICRO: NORMAL /LPF
CHLORIDE SERPL-SCNC: 105 MMOL/L (ref 98–107)
CO2 SERPL-SCNC: 26 MMOL/L (ref 21–32)
CREAT SERPL-MCNC: 0.58 MG/DL (ref 0.6–1)
DIFFERENTIAL METHOD BLD: ABNORMAL
EOSINOPHIL # BLD: 0.1 K/UL (ref 0–0.8)
EOSINOPHIL NFR BLD: 1 % (ref 0.5–7.8)
EPI CELLS #/AREA URNS HPF: NORMAL /HPF
ERYTHROCYTE [DISTWIDTH] IN BLOOD BY AUTOMATED COUNT: 12.4 % (ref 11.9–14.6)
GLOBULIN SER CALC-MCNC: 3.8 G/DL (ref 2.3–3.5)
GLUCOSE SERPL-MCNC: 86 MG/DL (ref 65–100)
HCT VFR BLD AUTO: 38.7 % (ref 35.8–46.3)
HGB BLD-MCNC: 13.2 G/DL (ref 11.7–15.4)
IMM GRANULOCYTES # BLD AUTO: 0 K/UL (ref 0–0.5)
IMM GRANULOCYTES NFR BLD AUTO: 0 % (ref 0–5)
LYMPHOCYTES # BLD: 2.2 K/UL (ref 0.5–4.6)
LYMPHOCYTES NFR BLD: 21 % (ref 13–44)
MAGNESIUM SERPL-MCNC: 1.8 MG/DL (ref 1.8–2.4)
MCH RBC QN AUTO: 30.6 PG (ref 26.1–32.9)
MCHC RBC AUTO-ENTMCNC: 34.1 G/DL (ref 31.4–35)
MCV RBC AUTO: 89.8 FL (ref 79.6–97.8)
MONOCYTES # BLD: 0.7 K/UL (ref 0.1–1.3)
MONOCYTES NFR BLD: 7 % (ref 4–12)
NEUTS SEG # BLD: 7.3 K/UL (ref 1.7–8.2)
NEUTS SEG NFR BLD: 71 % (ref 43–78)
NRBC # BLD: 0 K/UL (ref 0–0.2)
PLATELET # BLD AUTO: 300 K/UL (ref 150–450)
PMV BLD AUTO: 9 FL (ref 9.4–12.3)
POTASSIUM SERPL-SCNC: 3.5 MMOL/L (ref 3.5–5.1)
PROT SERPL-MCNC: 7 G/DL (ref 6.3–8.2)
RBC # BLD AUTO: 4.31 M/UL (ref 4.05–5.2)
RBC #/AREA URNS HPF: NORMAL /HPF
SODIUM SERPL-SCNC: 138 MMOL/L (ref 136–145)
WBC # BLD AUTO: 10.3 K/UL (ref 4.3–11.1)
WBC URNS QL MICRO: NORMAL /HPF

## 2021-09-19 PROCEDURE — 74011000250 HC RX REV CODE- 250: Performed by: EMERGENCY MEDICINE

## 2021-09-19 PROCEDURE — 81015 MICROSCOPIC EXAM OF URINE: CPT

## 2021-09-19 PROCEDURE — 87086 URINE CULTURE/COLONY COUNT: CPT

## 2021-09-19 PROCEDURE — 99283 EMERGENCY DEPT VISIT LOW MDM: CPT

## 2021-09-19 PROCEDURE — 85025 COMPLETE CBC W/AUTO DIFF WBC: CPT

## 2021-09-19 PROCEDURE — 80053 COMPREHEN METABOLIC PANEL: CPT

## 2021-09-19 PROCEDURE — 74011000258 HC RX REV CODE- 258: Performed by: EMERGENCY MEDICINE

## 2021-09-19 PROCEDURE — 96365 THER/PROPH/DIAG IV INF INIT: CPT

## 2021-09-19 PROCEDURE — 74011250636 HC RX REV CODE- 250/636: Performed by: PHYSICIAN ASSISTANT

## 2021-09-19 PROCEDURE — 74011250636 HC RX REV CODE- 250/636: Performed by: EMERGENCY MEDICINE

## 2021-09-19 PROCEDURE — 96375 TX/PRO/DX INJ NEW DRUG ADDON: CPT

## 2021-09-19 PROCEDURE — 83735 ASSAY OF MAGNESIUM: CPT

## 2021-09-19 RX ORDER — CEPHALEXIN 500 MG/1
500 CAPSULE ORAL 4 TIMES DAILY
Qty: 28 CAPSULE | Refills: 0 | Status: SHIPPED | OUTPATIENT
Start: 2021-09-19 | End: 2021-09-26

## 2021-09-19 RX ADMIN — SODIUM CHLORIDE 1000 ML: 900 INJECTION, SOLUTION INTRAVENOUS at 19:16

## 2021-09-19 RX ADMIN — CEFTRIAXONE 1 G: 1 INJECTION, POWDER, FOR SOLUTION INTRAMUSCULAR; INTRAVENOUS at 19:35

## 2021-09-19 RX ADMIN — PROMETHAZINE HYDROCHLORIDE 12.5 MG: 25 INJECTION INTRAMUSCULAR; INTRAVENOUS at 19:16

## 2021-09-19 NOTE — ED PROVIDER NOTES
62-year-old female ,0,10,2 who is currently at around 11 weeks pregnant with history of hyperemesis gravidarum on Zofran pump with complaint of persistent nausea, vomiting, dizziness with standing, fatigue over the past several days. Patient denies any pelvic pain, vaginal bleeding, vaginal discharge, leakage of fluids, fever, chills, cough, shortness breath, chest pain, diarrhea, nasal ingestion, dysuria, hematuria, flank pain. The history is provided by the patient. No  was used. Pregnancy Problem   This is a recurrent problem. The current episode started more than 1 week ago. The problem occurs constantly. The problem has not changed since onset. Associated symptoms include nausea and vomiting. Pertinent negatives include no fever, no diarrhea, no constipation, no dysuria, no hematuria, no headaches, no arthralgias, no myalgias, no chest pain and no back pain.         Past Medical History:   Diagnosis Date    Chlamydia     GERD (gastroesophageal reflux disease) 10/16/2019    Gonorrhea     High cholesterol     as a child    [facility]    Post concussion syndrome 10/16/2019       Past Surgical History:   Procedure Laterality Date    HX DILATION AND CURETTAGE      x9         Family History:   Problem Relation Age of Onset    Diabetes Father     Breast Problems Other     Cancer Paternal Grandfather         Prostate CA    Crohn's Disease Sister     Seizures Sister     Thyroid Disease Paternal Grandmother        Social History     Socioeconomic History    Marital status: SINGLE     Spouse name: Not on file    Number of children: Not on file    Years of education: Not on file    Highest education level: Not on file   Occupational History    Not on file   Tobacco Use    Smoking status: Former Smoker    Smokeless tobacco: Never Used   Substance and Sexual Activity    Alcohol use: No    Drug use: No    Sexual activity: Yes     Partners: Male     Birth control/protection: None   Other Topics Concern    Not on file   Social History Narrative    Not on file     Social Determinants of Health     Financial Resource Strain:     Difficulty of Paying Living Expenses:    Food Insecurity:     Worried About Running Out of Food in the Last Year:     [address] in the Last Year:    Transportation Needs:     Lack of Transportation (Medical):  Lack of Transportation (Non-Medical):    Physical Activity:     Days of Exercise per Week:     Minutes of Exercise per Session:    Stress:     Feeling of Stress :    Social Connections:     Frequency of Communication with Friends and Family:     Frequency of Social Gatherings with Friends and Family:     Attends Buddhism Services:     Active Member of Clubs or Organizations:     Attends Club or Organization Meetings:     Marital Status:    Intimate Partner Violence:     Fear of Current or Ex-Partner:     Emotionally Abused:     Physically Abused:     Sexually Abused: ALLERGIES: Reglan [metoclopramide]    Review of Systems   Constitutional: Negative for chills, diaphoresis, fatigue and fever. HENT: Negative for congestion, rhinorrhea and sore throat. Eyes: Negative for visual disturbance. Respiratory: Negative for cough, shortness of breath and wheezing. Cardiovascular: Negative for chest pain and leg swelling. Gastrointestinal: Positive for nausea and vomiting. Negative for abdominal pain, blood in stool, constipation and diarrhea. Genitourinary: Negative for dysuria, flank pain, genital sores, hematuria, pelvic pain, vaginal bleeding, vaginal discharge and vaginal pain. Musculoskeletal: Negative for arthralgias, back pain and myalgias. Skin: Negative for color change, pallor and rash. Neurological: Negative for seizures, syncope, facial asymmetry, weakness, numbness and headaches. Hematological: Does not bruise/bleed easily. Vitals:    09/19/21 1820   BP: (!) 145/100   Pulse: (!) 108   Resp: 16   Temp: 98 °F (36.7 °C)   SpO2: 99%   Height: 5' 3\" (1.6 m)            Physical Exam  Vitals and nursing note reviewed. Constitutional:       Appearance: Normal appearance. HENT:      Head: Normocephalic. Nose: Nose normal.      Mouth/Throat:      Mouth: Mucous membranes are moist.   Eyes:      Extraocular Movements: Extraocular movements intact. Pupils: Pupils are equal, round, and reactive to light. Cardiovascular:      Rate and Rhythm: Normal rate. Pulses: Normal pulses. Heart sounds: Normal heart sounds. Pulmonary:      Effort: Pulmonary effort is normal.      Breath sounds: Normal breath sounds. Abdominal:      General: Bowel sounds are normal.      Palpations: Abdomen is soft. Tenderness: There is no abdominal tenderness. There is no guarding or rebound. Comments: Soft, nontender, nondistended. No rebound or guarding. Zofran pump in place. Musculoskeletal:         General: Normal range of motion. Comments: No LE edema. No calf TTP. Skin:     Findings: No erythema or rash. Neurological:      General: No focal deficit present. Mental Status: She is alert and oriented to person, place, and time. Cranial Nerves: No cranial nerve deficit. Motor: No weakness. MDM  Number of Diagnoses or Management Options  Acute cystitis without hematuria  Hyperemesis gravidarum: new and requires workup  Diagnosis management comments: Patient reports continued nausea, vomiting even with Zofran pump in place. Order placed for 1 L normal saline IV fluid bolus. CBC, CMP, mag, urine dipstick pending.  ==================================================================  CMP, CBC unremarkable. Urine dipstick with 80 ketones. Trace LE. Urine micro with 10-20 WBCs, trace bacteria, 5-10 epithelial cells. Urine culture added on. Rocephin 1 g IV ordered.   Case discussed with Dr. Rasheed Plummer on-call for [facility]. Recommends discharge home with Keflex and the patient to continue using her Zofran pump and Phenergan suppositories as needed. Instructed the patient will need follow-up appointment in the next 24 to 48 hours. Amount and/or Complexity of Data Reviewed  Clinical lab tests: ordered and reviewed  Tests in the medicine section of CPT®: ordered and reviewed  Review and summarize past medical records: yes  Discuss the patient with other providers: yes  Independent visualization of images, tracings, or specimens: yes    Risk of Complications, Morbidity, and/or Mortality  Presenting problems: moderate  Diagnostic procedures: moderate  Management options: moderate    Patient Progress  Patient progress: stable    ED Course as of Sep 19 1938   Sun Sep 19, 2021   1906 Creatinine(!): 0.58 [DF]   1906 Glucose: 86 [DF]   1920 Dipstick with trace leukocyte esterase, ketones 80. Urine micro sent. [DF]      ED Course User Index  [DF] Syed Tanner MD       Procedures    Results Include:    Recent Results (from the past 24 hour(s))   CBC WITH AUTOMATED DIFF    Collection Time: 09/19/21  6:30 PM   Result Value Ref Range    WBC 10.3 4.3 - 11.1 K/uL    RBC 4.31 4.05 - 5.2 M/uL    HGB 13.2 11.7 - 15.4 g/dL    HCT 38.7 35.8 - 46.3 %    MCV 89.8 79.6 - 97.8 FL    MCH 30.6 26.1 - 32.9 PG    MCHC 34.1 31.4 - 35.0 g/dL    RDW 12.4 11.9 - 14.6 %    PLATELET 857 887 - 656 K/uL    MPV 9.0 (L) 9.4 - 12.3 FL    ABSOLUTE NRBC 0.00 0.0 - 0.2 K/uL    DF AUTOMATED      NEUTROPHILS 71 43 - 78 %    LYMPHOCYTES 21 13 - 44 %    MONOCYTES 7 4.0 - 12.0 %    EOSINOPHILS 1 0.5 - 7.8 %    BASOPHILS 0 0.0 - 2.0 %    IMMATURE GRANULOCYTES 0 0.0 - 5.0 %    ABS. NEUTROPHILS 7.3 1.7 - 8.2 K/UL    ABS. LYMPHOCYTES 2.2 0.5 - 4.6 K/UL    ABS. MONOCYTES 0.7 0.1 - 1.3 K/UL    ABS. EOSINOPHILS 0.1 0.0 - 0.8 K/UL    ABS. BASOPHILS 0.0 0.0 - 0.2 K/UL    ABS. IMM.  GRANS. 0.0 0.0 - 0.5 K/UL   MAGNESIUM    Collection Time: 09/19/21  6:30 PM   Result Value Ref Range    Magnesium 1.8 1.8 - 2.4 mg/dL   METABOLIC PANEL, COMPREHENSIVE    Collection Time: 09/19/21  6:31 PM   Result Value Ref Range    Sodium 138 136 - 145 mmol/L    Potassium 3.5 3.5 - 5.1 mmol/L    Chloride 105 98 - 107 mmol/L    CO2 26 21 - 32 mmol/L    Anion gap 7 7 - 16 mmol/L    Glucose 86 65 - 100 mg/dL    BUN 6 6 - 23 MG/DL    Creatinine 0.58 (L) 0.6 - 1.0 MG/DL    GFR est AA >60 >60 ml/min/1.73m2    GFR est non-AA >60 >60 ml/min/1.73m2    Calcium 9.8 8.3 - 10.4 MG/DL    Bilirubin, total 0.4 0.2 - 1.1 MG/DL    ALT (SGPT) 15 12 - 65 U/L    AST (SGOT) 7 (L) 15 - 37 U/L    Alk. phosphatase 61 50 - 136 U/L    Protein, total 7.0 6.3 - 8.2 g/dL    Albumin 3.2 (L) 3.5 - 5.0 g/dL    Globulin 3.8 (H) 2.3 - 3.5 g/dL    A-G Ratio 0.8 (L) 1.2 - 3.5     URINE MICROSCOPIC    Collection Time: 09/19/21  7:24 PM   Result Value Ref Range    WBC 10-20 0 /hpf    RBC 0-3 0 /hpf    Epithelial cells 5-10 0 /hpf    Bacteria TRACE 0 /hpf    Casts 3-5 0 /lpf                    Stanislaw Hoskins MD; 9/19/2021 @6:36 PM Voice dictation software was used during the making of this note. This software is not perfect and grammatical and other typographical errors may be present.   This note has not been proofread for errors.  ===================================================================

## 2021-09-19 NOTE — ED TRIAGE NOTES
Pt states she is 11 weeks pregnant. Pt was sent here by home health nurse. Pt is on zofran pump.     Danielle Lobo RN

## 2021-09-19 NOTE — DISCHARGE INSTRUCTIONS
Use Zofran pump as directed. Use Phenergan suppositories as previously instructed. Take Keflex as prescribed. Follow-up with OB/GYN in 24 to 48 hours.

## 2021-09-20 NOTE — ED NOTES
I have reviewed discharge instructions with the patient. The patient verbalized understanding. Patient left ED via Discharge Method: ambulatory to Home with aunt. Opportunity for questions and clarification provided. Patient given 1 scripts. To continue your aftercare when you leave the hospital, you may receive an automated call from our care team to check in on how you are doing. This is a free service and part of our promise to provide the best care and service to meet your aftercare needs.  If you have questions, or wish to unsubscribe from this service please call [phone]. Thank you for Choosing our [address] [place] [address] Emergency Department.

## 2021-09-21 LAB
BACTERIA SPEC CULT: NORMAL
BACTERIA SPEC CULT: NORMAL
SERVICE CMNT-IMP: NORMAL

## 2021-09-29 PROBLEM — Z3A.13 13 WEEKS GESTATION OF PREGNANCY: Status: ACTIVE | Noted: 2021-09-29

## 2021-09-29 PROBLEM — F32.A DEPRESSION AFFECTING PREGNANCY IN FIRST TRIMESTER, ANTEPARTUM: Status: ACTIVE | Noted: 2019-10-16

## 2021-09-29 PROBLEM — Z36.82 NUCHAL TRANSLUCENCY OF FETUS ON PRENATAL ULTRASOUND: Status: ACTIVE | Noted: 2021-09-29

## 2021-09-29 PROBLEM — O99.341 DEPRESSION AFFECTING PREGNANCY IN FIRST TRIMESTER, ANTEPARTUM: Status: ACTIVE | Noted: 2019-10-16

## 2021-09-29 PROBLEM — F07.81 POST CONCUSSION SYNDROME: Status: RESOLVED | Noted: 2019-10-16 | Resolved: 2021-09-29

## 2021-09-29 PROBLEM — O21.0 HYPEREMESIS AFFECTING PREGNANCY, ANTEPARTUM: Status: ACTIVE | Noted: 2021-08-18

## 2021-09-29 PROBLEM — O09.91 HIGH-RISK PREGNANCY IN FIRST TRIMESTER: Status: ACTIVE | Noted: 2021-09-09

## 2021-09-30 ENCOUNTER — HOSPITAL ENCOUNTER (OUTPATIENT)
Age: 36
Setting detail: OBSERVATION
LOS: 1 days | Discharge: HOME | End: 2021-09-30
Attending: OBSTETRICS & GYNECOLOGY | Admitting: OBSTETRICS & GYNECOLOGY
Payer: COMMERCIAL

## 2021-09-30 VITALS
RESPIRATION RATE: 18 BRPM | TEMPERATURE: 98.8 F | DIASTOLIC BLOOD PRESSURE: 69 MMHG | SYSTOLIC BLOOD PRESSURE: 110 MMHG | HEART RATE: 89 BPM

## 2021-09-30 PROBLEM — Z3A.13 13 WEEKS GESTATION OF PREGNANCY: Status: RESOLVED | Noted: 2021-09-29 | Resolved: 2021-09-30

## 2021-09-30 PROBLEM — O99.612 GASTROESOPHAGEAL REFLUX IN PREGNANCY IN SECOND TRIMESTER: Status: ACTIVE | Noted: 2019-10-16

## 2021-09-30 PROBLEM — O99.611 GASTROESOPHAGEAL REFLUX DURING PREGNANCY IN FIRST TRIMESTER, ANTEPARTUM: Status: ACTIVE | Noted: 2019-10-16

## 2021-09-30 PROBLEM — O09.211 HISTORY OF PRETERM LABOR, CURRENT PREGNANCY, FIRST TRIMESTER: Status: ACTIVE | Noted: 2021-09-30

## 2021-09-30 PROBLEM — O21.1 HYPEREMESIS GRAVIDARUM BEFORE END OF 22 WEEK GESTATION WITH ELECTROLYTE IMBALANCE: Status: ACTIVE | Noted: 2021-09-30

## 2021-09-30 PROBLEM — Z36.82 NUCHAL TRANSLUCENCY OF FETUS ON PRENATAL ULTRASOUND: Status: RESOLVED | Noted: 2021-09-29 | Resolved: 2021-09-30

## 2021-09-30 LAB
ALBUMIN SERPL-MCNC: 2.7 G/DL (ref 3.5–5)
ALBUMIN/GLOB SERPL: 0.7 {RATIO} (ref 1.2–3.5)
ALP SERPL-CCNC: 69 U/L (ref 50–130)
ALT SERPL-CCNC: 14 U/L (ref 12–65)
ANION GAP SERPL CALC-SCNC: 5 MMOL/L (ref 7–16)
AST SERPL-CCNC: 20 U/L (ref 15–37)
BILIRUB SERPL-MCNC: 0.2 MG/DL (ref 0.2–1.1)
BUN SERPL-MCNC: 5 MG/DL (ref 6–23)
CALCIUM SERPL-MCNC: 8.5 MG/DL (ref 8.3–10.4)
CHLORIDE SERPL-SCNC: 107 MMOL/L (ref 98–107)
CO2 SERPL-SCNC: 22 MMOL/L (ref 21–32)
CREAT SERPL-MCNC: 0.52 MG/DL (ref 0.6–1)
ERYTHROCYTE [DISTWIDTH] IN BLOOD BY AUTOMATED COUNT: 12.3 % (ref 11.9–14.6)
GLOBULIN SER CALC-MCNC: 3.7 G/DL (ref 2.3–3.5)
GLUCOSE SERPL-MCNC: 122 MG/DL (ref 65–100)
GLUCOSE, GLUUPC: NEGATIVE
HCT VFR BLD AUTO: 33.7 % (ref 35.8–46.3)
HGB BLD-MCNC: 11.6 G/DL (ref 11.7–15.4)
KETONES UR-MCNC: NEGATIVE MG/DL
MAGNESIUM SERPL-MCNC: 1.9 MG/DL (ref 1.8–2.4)
MCH RBC QN AUTO: 30.8 PG (ref 26.1–32.9)
MCHC RBC AUTO-ENTMCNC: 34.4 G/DL (ref 31.4–35)
MCV RBC AUTO: 89.4 FL (ref 79.6–97.8)
NRBC # BLD: 0 K/UL (ref 0–0.2)
PLATELET # BLD AUTO: 282 K/UL (ref 150–450)
PMV BLD AUTO: 9.6 FL (ref 9.4–12.3)
POTASSIUM SERPL-SCNC: 3.8 MMOL/L (ref 3.5–5.1)
PROT SERPL-MCNC: 6.4 G/DL (ref 6.3–8.2)
PROT UR QL: NEGATIVE
RBC # BLD AUTO: 3.77 M/UL (ref 4.05–5.2)
SODIUM SERPL-SCNC: 134 MMOL/L (ref 136–145)
WBC # BLD AUTO: 7.5 K/UL (ref 4.3–11.1)

## 2021-09-30 PROCEDURE — 80053 COMPREHEN METABOLIC PANEL: CPT

## 2021-09-30 PROCEDURE — 99218 HC RM OBSERVATION: CPT

## 2021-09-30 PROCEDURE — 74011250637 HC RX REV CODE- 250/637: Performed by: OBSTETRICS & GYNECOLOGY

## 2021-09-30 PROCEDURE — 99285 EMERGENCY DEPT VISIT HI MDM: CPT

## 2021-09-30 PROCEDURE — 74011000258 HC RX REV CODE- 258: Performed by: OBSTETRICS & GYNECOLOGY

## 2021-09-30 PROCEDURE — 74011000250 HC RX REV CODE- 250: Performed by: OBSTETRICS & GYNECOLOGY

## 2021-09-30 PROCEDURE — 96374 THER/PROPH/DIAG INJ IV PUSH: CPT

## 2021-09-30 PROCEDURE — 74011250636 HC RX REV CODE- 250/636: Performed by: OBSTETRICS & GYNECOLOGY

## 2021-09-30 PROCEDURE — 96365 THER/PROPH/DIAG IV INF INIT: CPT

## 2021-09-30 PROCEDURE — 83735 ASSAY OF MAGNESIUM: CPT

## 2021-09-30 PROCEDURE — 85027 COMPLETE CBC AUTOMATED: CPT

## 2021-09-30 PROCEDURE — 81002 URINALYSIS NONAUTO W/O SCOPE: CPT | Performed by: OBSTETRICS & GYNECOLOGY

## 2021-09-30 PROCEDURE — 96360 HYDRATION IV INFUSION INIT: CPT

## 2021-09-30 PROCEDURE — 96375 TX/PRO/DX INJ NEW DRUG ADDON: CPT

## 2021-09-30 RX ORDER — ONDANSETRON 2 MG/ML
6 INJECTION INTRAMUSCULAR; INTRAVENOUS EVERY 6 HOURS
Status: DISCONTINUED | OUTPATIENT
Start: 2021-09-30 | End: 2021-10-01 | Stop reason: HOSPADM

## 2021-09-30 RX ORDER — POLYETHYLENE GLYCOL 3350 17 G/17G
17 POWDER, FOR SOLUTION ORAL
Status: DISCONTINUED | OUTPATIENT
Start: 2021-09-30 | End: 2021-10-01 | Stop reason: HOSPADM

## 2021-09-30 RX ORDER — PROMETHAZINE HYDROCHLORIDE 25 MG/ML
25 INJECTION, SOLUTION INTRAMUSCULAR; INTRAVENOUS
Status: DISCONTINUED | OUTPATIENT
Start: 2021-09-30 | End: 2021-10-01 | Stop reason: HOSPADM

## 2021-09-30 RX ORDER — ADHESIVE BANDAGE
30 BANDAGE TOPICAL DAILY PRN
Status: DISCONTINUED | OUTPATIENT
Start: 2021-09-30 | End: 2021-10-01 | Stop reason: HOSPADM

## 2021-09-30 RX ORDER — SODIUM CHLORIDE, SODIUM LACTATE, POTASSIUM CHLORIDE, CALCIUM CHLORIDE 600; 310; 30; 20 MG/100ML; MG/100ML; MG/100ML; MG/100ML
150 INJECTION, SOLUTION INTRAVENOUS CONTINUOUS
Status: DISCONTINUED | OUTPATIENT
Start: 2021-09-30 | End: 2021-10-01 | Stop reason: HOSPADM

## 2021-09-30 RX ORDER — PROMETHAZINE HYDROCHLORIDE 25 MG/1
25 SUPPOSITORY RECTAL
Status: DISCONTINUED | OUTPATIENT
Start: 2021-09-30 | End: 2021-10-01 | Stop reason: HOSPADM

## 2021-09-30 RX ADMIN — MAGNESIUM HYDROXIDE 30 ML: 2400 SUSPENSION ORAL at 15:16

## 2021-09-30 RX ADMIN — SODIUM CHLORIDE, SODIUM LACTATE, POTASSIUM CHLORIDE, AND CALCIUM CHLORIDE 150 ML/HR: 600; 310; 30; 20 INJECTION, SOLUTION INTRAVENOUS at 14:44

## 2021-09-30 RX ADMIN — THIAMINE HYDROCHLORIDE: 100 INJECTION, SOLUTION INTRAMUSCULAR; INTRAVENOUS at 15:14

## 2021-09-30 RX ADMIN — SODIUM CHLORIDE 25 MG: 9 INJECTION INTRAMUSCULAR; INTRAVENOUS; SUBCUTANEOUS at 15:13

## 2021-09-30 NOTE — PROGRESS NOTES
Results for Dalton Muñoz (MRN [id number]) as of 9/30/2021 15:40   Ref. Range 9/30/2021 15:30   Protein (POC) Latest Ref Range: Negative  Negative   Glucose, urine (POC) Latest Ref Range: Negative  Negative   Ketones (POC) Latest Ref Range: Negative  Negative       Urine voided was pale yellow and clear.  No ketones noted

## 2021-10-01 NOTE — PROGRESS NOTES
Banana Bag completed IV dc'c cath tip intact. Patient discharged to home stable with instructions to follow a BRAT diet and to stay hydrated. Patient will keep next OB appointment. Patient verbalized understanding of discharge instructions. Questions answered and encouraged. Patient taken to entrance C via wheelchair by this nurse.

## 2021-10-01 NOTE — DISCHARGE INSTRUCTIONS
Patient Education   Patient Education        Learning About Acute Cholecystitis  What is cholecystitis? Cholecystitis (say \"koh-lih-sis-TY-tus\") is inflammation of the gallbladder. The gallbladder stores bile. Bile helps the body digest food. Normally, the bile flows from the gallbladder to the small intestine. A gallstone stuck in the cystic duct is most often the cause of sudden (acute) cholecystitis. The cystic duct is the tube that carries the bile out of the gallbladder. The gallstone blocks the bile from leaving the gallbladder. This results in an irritated and swollen gallbladder. The disease can also be caused by infection or trauma, such as an injury from a car accident. Cholecystitis has to be treated right away. You will probably have to go to the hospital. Surgery is the usual treatment. What are the symptoms? Symptoms include:  · Steady and severe pain in the upper right part of belly. This is the most common symptom. The pain can sometimes move to your back or right shoulder blade. It may last for more than 6 hours. · Nausea or vomiting. · A fever. How is it treated? The main way to treat this disease is surgery to remove the gallbladder. This surgery can often be done through small cuts (incisions) in the belly. This is called a laparoscopic cholecystectomy. In some cases, you may need a more extensive surgery. You may need surgery as soon as possible. The doctor may try to reduce swelling and irritation in the gallbladder before removing it. You may be given fluids and antibiotics through an IV. You may also be given pain medicine. Follow-up care is a key part of your treatment and safety. Be sure to make and go to all appointments, and call your doctor if you are having problems. It's also a good idea to know your test results and keep a list of the medicines you take. Where can you learn more?   Go to http://www.gray.com/  Enter T940 in the search box to learn more about \"Learning About Acute Cholecystitis. \"  Current as of: February 10, 2021               Content Version: 13.0  © 2006-2021 [employer]. Care instructions adapted under license by [employer] (which disclaims liability or warranty for this information). If you have questions about a medical condition or this instruction, always ask your healthcare professional. [address] any warranty or liability for your use of this information. Dehydration: Care Instructions  Your Care Instructions  Dehydration happens when your body loses too much fluid. This might happen when you do not drink enough water or you lose large amounts of fluids from your body because of diarrhea, vomiting, or sweating. Severe dehydration can be life-threatening. Water and minerals called electrolytes help put your body fluids back in balance. Learn the early signs of fluid loss, and drink more fluids to prevent dehydration. Follow-up care is a key part of your treatment and safety. Be sure to make and go to all appointments, and call your doctor if you are having problems. It's also a good idea to know your test results and keep a list of the medicines you take. How can you care for yourself at home? · Drink plenty of fluids. Choose water and other clear liquids until you feel better. If you have kidney, heart, or liver disease and have to limit fluids, talk with your doctor before you increase the amount of fluids you drink. · If you do not feel like eating or drinking, try taking small sips of water, sports drinks, or other rehydration drinks. · Get plenty of rest.  To prevent dehydration  · Add more fluids to your diet and daily routine, unless your doctor has told you not to. · During hot weather, drink more fluids. Drink even more fluids if you exercise a lot. Stay away from drinks with alcohol or caffeine. · Watch for the symptoms of dehydration.  These include:  ? A dry, sticky mouth. ? Not much urine. ? Dry and sunken eyes. ? Feeling very tired. · Learn what problems can lead to dehydration. These include:  ? Diarrhea, fever, and vomiting. ? Any illness with a fever, such as pneumonia or the flu. ? Activities that cause heavy sweating, such as endurance races and heavy outdoor work in hot or humid weather. ? Alcohol or drug use or problems caused by quitting their use (withdrawal). ? Certain medicines, such as cold and allergy pills (antihistamines), diet pills (diuretics), and laxatives. ? Certain diseases, such as diabetes, cancer, and heart or kidney disease. When should you call for help? Call 911 anytime you think you may need emergency care. For example, call if:    · You passed out (lost consciousness). Call your doctor now or seek immediate medical care if:    · You are confused and cannot think clearly.     · You are dizzy or lightheaded, or you feel like you may faint.     · You have signs of needing more fluids. You have sunken eyes, a dry mouth, and you pass only a little urine.     · You cannot keep fluids down. Watch closely for changes in your health, and be sure to contact your doctor if:    · You are not making tears.     · Your skin is very dry and sags slowly back into place after you pinch it.     · Your mouth and eyes are very dry. Where can you learn more? Go to http://www.gray.com/  Enter Q814 in the search box to learn more about \"Dehydration: Care Instructions. \"  Current as of: July 1, 2021               Content Version: 13.0  © 6853-4862 [employer]. Care instructions adapted under license by [employer] (which disclaims liability or warranty for this information).  If you have questions about a medical condition or this instruction, always ask your healthcare professional. [place] [address] any warranty or liability for your use of this information.

## 2021-10-07 ENCOUNTER — HOSPITAL ENCOUNTER (EMERGENCY)
Age: 36
Discharge: HOME | End: 2021-10-07
Attending: EMERGENCY MEDICINE
Payer: COMMERCIAL

## 2021-10-07 ENCOUNTER — APPOINTMENT (OUTPATIENT)
Dept: ULTRASOUND IMAGING | Age: 36
End: 2021-10-07
Attending: EMERGENCY MEDICINE
Payer: COMMERCIAL

## 2021-10-07 VITALS
HEART RATE: 84 BPM | TEMPERATURE: 98.7 F | HEIGHT: 63 IN | RESPIRATION RATE: 17 BRPM | SYSTOLIC BLOOD PRESSURE: 120 MMHG | WEIGHT: 150 LBS | BODY MASS INDEX: 26.58 KG/M2 | OXYGEN SATURATION: 99 % | DIASTOLIC BLOOD PRESSURE: 68 MMHG

## 2021-10-07 DIAGNOSIS — N83.201 CYST OF RIGHT OVARY: Primary | ICD-10-CM

## 2021-10-07 LAB
ALBUMIN SERPL-MCNC: 3 G/DL (ref 3.5–5)
ALBUMIN/GLOB SERPL: 0.8 {RATIO} (ref 1.2–3.5)
ALP SERPL-CCNC: 61 U/L (ref 50–130)
ALT SERPL-CCNC: 17 U/L (ref 12–65)
ANION GAP SERPL CALC-SCNC: 6 MMOL/L (ref 7–16)
AST SERPL-CCNC: 19 U/L (ref 15–37)
BACTERIA URNS QL MICRO: NORMAL /HPF
BASOPHILS # BLD: 0 K/UL (ref 0–0.2)
BASOPHILS NFR BLD: 0 % (ref 0–2)
BILIRUB SERPL-MCNC: 0.1 MG/DL (ref 0.2–1.1)
BUN SERPL-MCNC: 4 MG/DL (ref 6–23)
CALCIUM SERPL-MCNC: 9 MG/DL (ref 8.3–10.4)
CASTS URNS QL MICRO: 0 /LPF
CHLORIDE SERPL-SCNC: 107 MMOL/L (ref 98–107)
CO2 SERPL-SCNC: 23 MMOL/L (ref 21–32)
CREAT SERPL-MCNC: 0.59 MG/DL (ref 0.6–1)
CRYSTALS URNS QL MICRO: 0 /LPF
DIFFERENTIAL METHOD BLD: ABNORMAL
EOSINOPHIL # BLD: 0.1 K/UL (ref 0–0.8)
EOSINOPHIL NFR BLD: 2 % (ref 0.5–7.8)
EPI CELLS #/AREA URNS HPF: NORMAL /HPF
ERYTHROCYTE [DISTWIDTH] IN BLOOD BY AUTOMATED COUNT: 12.5 % (ref 11.9–14.6)
GLOBULIN SER CALC-MCNC: 4 G/DL (ref 2.3–3.5)
GLUCOSE SERPL-MCNC: 87 MG/DL (ref 65–100)
HCT VFR BLD AUTO: 37.2 % (ref 35.8–46.3)
HGB BLD-MCNC: 12.6 G/DL (ref 11.7–15.4)
IMM GRANULOCYTES # BLD AUTO: 0 K/UL (ref 0–0.5)
IMM GRANULOCYTES NFR BLD AUTO: 0 % (ref 0–5)
LYMPHOCYTES # BLD: 2.2 K/UL (ref 0.5–4.6)
LYMPHOCYTES NFR BLD: 28 % (ref 13–44)
MCH RBC QN AUTO: 30.5 PG (ref 26.1–32.9)
MCHC RBC AUTO-ENTMCNC: 33.9 G/DL (ref 31.4–35)
MCV RBC AUTO: 90.1 FL (ref 79.6–97.8)
MONOCYTES # BLD: 0.5 K/UL (ref 0.1–1.3)
MONOCYTES NFR BLD: 7 % (ref 4–12)
MUCOUS THREADS URNS QL MICRO: NORMAL /LPF
NEUTS SEG # BLD: 5.1 K/UL (ref 1.7–8.2)
NEUTS SEG NFR BLD: 63 % (ref 43–78)
NRBC # BLD: 0 K/UL (ref 0–0.2)
OTHER OBSERVATIONS,UCOM: NORMAL
PLATELET # BLD AUTO: 300 K/UL (ref 150–450)
PMV BLD AUTO: 9 FL (ref 9.4–12.3)
POTASSIUM SERPL-SCNC: 4.1 MMOL/L (ref 3.5–5.1)
PROT SERPL-MCNC: 7 G/DL (ref 6.3–8.2)
RBC # BLD AUTO: 4.13 M/UL (ref 4.05–5.2)
RBC #/AREA URNS HPF: NORMAL /HPF
SODIUM SERPL-SCNC: 136 MMOL/L (ref 136–145)
WBC # BLD AUTO: 8 K/UL (ref 4.3–11.1)
WBC URNS QL MICRO: NORMAL /HPF

## 2021-10-07 PROCEDURE — 80053 COMPREHEN METABOLIC PANEL: CPT

## 2021-10-07 PROCEDURE — 81003 URINALYSIS AUTO W/O SCOPE: CPT

## 2021-10-07 PROCEDURE — 81015 MICROSCOPIC EXAM OF URINE: CPT

## 2021-10-07 PROCEDURE — 99284 EMERGENCY DEPT VISIT MOD MDM: CPT

## 2021-10-07 PROCEDURE — 76805 OB US >/= 14 WKS SNGL FETUS: CPT

## 2021-10-07 PROCEDURE — 74011250636 HC RX REV CODE- 250/636: Performed by: EMERGENCY MEDICINE

## 2021-10-07 PROCEDURE — 96374 THER/PROPH/DIAG INJ IV PUSH: CPT

## 2021-10-07 PROCEDURE — 85025 COMPLETE CBC W/AUTO DIFF WBC: CPT

## 2021-10-07 RX ORDER — NALBUPHINE HYDROCHLORIDE 10 MG/ML
5 INJECTION, SOLUTION INTRAMUSCULAR; INTRAVENOUS; SUBCUTANEOUS
Status: COMPLETED | OUTPATIENT
Start: 2021-10-07 | End: 2021-10-07

## 2021-10-07 RX ADMIN — NALBUPHINE HYDROCHLORIDE 5 MG: 10 INJECTION, SOLUTION INTRAMUSCULAR; INTRAVENOUS; SUBCUTANEOUS at 15:19

## 2021-10-07 NOTE — ED NOTES
I have reviewed discharge instructions with the patient. The patient verbalized understanding. Patient left ED via Discharge Method: ambulatory to Home with boyfriend. Opportunity for questions and clarification provided. Patient given 0 scripts. To continue your aftercare when you leave the hospital, you may receive an automated call from our care team to check in on how you are doing. This is a free service and part of our promise to provide the best care and service to meet your aftercare needs.  If you have questions, or wish to unsubscribe from this service please call [phone]. Thank you for Choosing our [facility] Emergency Department.

## 2021-10-07 NOTE — ED PROVIDER NOTES
Patient presents emerge department for evaluation with a complaint of pelvic pain. The patient states the pain began about 2 PM yesterday and has been constant ever since she describes it as initially cramping but then the tightness in the suprapubic area and radiating into the sacral area. She denies any vaginal discharge or bleeding, she denies any dysuria or hematuria. She denies any fever or chills. She is being treated with a Zofran pump for hyperemesis gravidarum but has not had it on since yesterday. She does not feel nauseated at this time. He does report some constipation. The history is provided by the patient and medical records. Abdominal Pain   This is a new problem. The current episode started 12 to 24 hours ago. The problem occurs constantly. The problem has not changed since onset. The pain is associated with an unknown (Pregnancy) factor. The pain is located in the suprapubic region. The quality of the pain is cramping (Tightness). The pain is at a severity of 7/10. The pain is moderate. Associated symptoms include constipation. Pertinent negatives include no anorexia, no fever, no belching, no diarrhea, no flatus, no hematochezia, no melena, no nausea, no vomiting, no dysuria, no hematuria, no headaches, no arthralgias, no myalgias, no trauma, no chest pain and no back pain. Nothing worsens the pain. The pain is relieved by nothing. The patient's surgical history non-contributory.        Past Medical History:   Diagnosis Date    Anemia     Chlamydia 2006    GERD (gastroesophageal reflux disease) 10/16/2019    Gonorrhea 2006    High cholesterol     as a child    [facility]    Post concussion syndrome 10/16/2019    Post concussion syndrome 10/16/2019       Past Surgical History:   Procedure Laterality Date    HX DILATION AND CURETTAGE      x9         Family History:   Problem Relation Age of Onset    Diabetes Father    Matt Breast Problems Other     Cancer Paternal Grandfather         Prostate CA    Crohn's Disease Sister     Seizures Sister     Thyroid Disease Paternal Grandmother        Social History     Socioeconomic History    Marital status: SINGLE     Spouse name: Not on file    Number of children: Not on file    Years of education: Not on file    Highest education level: Not on file   Occupational History    Not on file   Tobacco Use    Smoking status: Former Smoker    Smokeless tobacco: Never Used   Vaping Use    Vaping Use: Former   Substance and Sexual Activity    Alcohol use: No    Drug use: No    Sexual activity: Yes     Partners: Male     Birth control/protection: None   Other Topics Concern    Not on file   Social History Narrative    Not on file     Social Determinants of Health     Financial Resource Strain:     Difficulty of Paying Living Expenses:    Food Insecurity:     Worried About [address] in the Last Year:     [address] in the Last Year:    Transportation Needs:     Lack of Transportation (Medical):  Lack of Transportation (Non-Medical):    Physical Activity:     Days of Exercise per Week:     Minutes of Exercise per Session:    Stress:     Feeling of Stress :    Social Connections:     Frequency of Communication with Friends and Family:     Frequency of Social Gatherings with Friends and Family:     Attends Worship Services:     Active Member of Clubs or Organizations:     Attends Club or Organization Meetings:     Marital Status:    Intimate Partner Violence:     Fear of Current or Ex-Partner:     Emotionally Abused:     Physically Abused:     Sexually Abused: ALLERGIES: Reglan [metoclopramide]    Review of Systems   Constitutional: Negative for fever. Cardiovascular: Negative for chest pain. Gastrointestinal: Positive for abdominal pain and constipation. Negative for anorexia, diarrhea, flatus, hematochezia, melena, nausea and vomiting. Genitourinary: Negative for dysuria and hematuria. Musculoskeletal: Negative for arthralgias, back pain and myalgias. Neurological: Negative for headaches. All other systems reviewed and are negative. Vitals:    10/07/21 1348   BP: 107/71   Pulse: 98   Resp: 18   Temp: 98.7 °F (37.1 °C)   SpO2: 100%   Weight: 68 kg (150 lb)   Height: 5' 3\" (1.6 m)            Physical Exam  Vitals and nursing note reviewed. Constitutional:       General: She is not in acute distress. Appearance: Normal appearance. She is not ill-appearing, toxic-appearing or diaphoretic. HENT:      Head: Normocephalic and atraumatic. Mouth/Throat:      Mouth: Mucous membranes are moist.      Pharynx: Oropharynx is clear. Eyes:      Extraocular Movements: Extraocular movements intact. Conjunctiva/sclera: Conjunctivae normal.      Pupils: Pupils are equal, round, and reactive to light. Cardiovascular:      Rate and Rhythm: Normal rate and regular rhythm. Pulses: Normal pulses. Heart sounds: Normal heart sounds. Pulmonary:      Effort: Pulmonary effort is normal.      Breath sounds: Normal breath sounds. Abdominal:      General: Bowel sounds are normal. There is no distension. Palpations: Abdomen is soft. Tenderness: There is abdominal tenderness. There is no right CVA tenderness, left CVA tenderness, guarding or rebound. Comments: Suprapubic tenderness is present. Musculoskeletal:      Cervical back: Normal range of motion and neck supple. Skin:     General: Skin is warm and dry. Capillary Refill: Capillary refill takes less than 2 seconds. Neurological:      General: No focal deficit present. Mental Status: She is alert and oriented to person, place, and time. Mental status is at baseline. Psychiatric:         Mood and Affect: Mood normal.         Behavior: Behavior normal.         Thought Content:  Thought content normal.          MDM  Number of Diagnoses or Management Options     Amount and/or Complexity of Data Reviewed  Clinical lab tests: ordered and reviewed  Tests in the radiology section of CPT®: ordered and reviewed  Review and summarize past medical records: yes  Independent visualization of images, tracings, or specimens: yes    Risk of Complications, Morbidity, and/or Mortality  Presenting problems: moderate  Diagnostic procedures: moderate  Management options: moderate    Patient Progress  Patient progress: stable         Procedures

## 2021-10-07 NOTE — ED TRIAGE NOTES
Pt states that she has been having lower abdominal cramping that wraps around to lower back since yesterday. Pt says she is 14 weeks pregnant. Pt denies vaginal bleeding. [facility] Pt states that she has a zofran pump but does not currently have it in.

## 2021-10-26 PROBLEM — O26.892 PREGNANCY HEADACHE IN SECOND TRIMESTER: Status: ACTIVE | Noted: 2021-10-26

## 2021-10-26 PROBLEM — O99.619 GASTROESOPHAGEAL REFLUX IN PREGNANCY: Status: ACTIVE | Noted: 2019-10-16

## 2021-10-26 PROBLEM — O09.92 HIGH-RISK PREGNANCY IN SECOND TRIMESTER: Status: ACTIVE | Noted: 2021-09-09

## 2021-10-26 PROBLEM — O09.212 CURRENT PREGNANCY WITH HISTORY OF PRE-TERM LABOR IN SECOND TRIMESTER: Status: ACTIVE | Noted: 2021-09-30

## 2021-10-26 PROBLEM — O99.340 DEPRESSION AFFECTING PREGNANCY: Status: ACTIVE | Noted: 2019-10-16

## 2021-10-26 PROBLEM — O99.891 BACK PAIN AFFECTING PREGNANCY IN SECOND TRIMESTER: Status: ACTIVE | Noted: 2021-10-26

## 2021-10-26 PROBLEM — M54.9 BACK PAIN AFFECTING PREGNANCY IN SECOND TRIMESTER: Status: ACTIVE | Noted: 2021-10-26

## 2021-10-26 PROBLEM — R51.9 PREGNANCY HEADACHE IN SECOND TRIMESTER: Status: ACTIVE | Noted: 2021-10-26

## 2021-10-27 ENCOUNTER — TELEPHONE (OUTPATIENT)
Dept: CASE MANAGEMENT | Age: 36
End: 2021-10-27

## 2021-10-27 NOTE — TELEPHONE ENCOUNTER
SW consult received from [facility]. Phone call to patient at [phone]. Patient reports multiple stressors related to her FMLA/STD thru her employer ([employer]). Per patient, her FMLA paperwork was not completed/submitted correctly by the OB. Therefore, her employment status is currently unstable. Patient was provided the opportunity to share how she's been feeling lately. Per patient, I've been real emotional because everything has changed. \"  Patient states that she and her 15 y/o twins have had to move in with her boyfriend. Due to uncertainties with [employer], patient has not received consistent income. She verbalized concerns about difficulty with paying for her car and car insurance. Patient states that her income exceeds the criteria to qualify for Medicaid, SNAP/Food [place], or WIC. Discussed coping skills and the importance of implementing self-care. Per patient, \"I don't want to do nothing. I don't want to be bothered with anybody (excluding my kids). \"  Additionally, patient reports an overall lack of energy and becoming easily fatigued. She also experiences frequent headaches and anxiety attacks. Patient states that she experienced depression while pregnant with her twins. She is not receptive to starting on medication at this time. Patient expressed interest in seeing a counselor, but she's unsure how long her will have the [employer] with [employer]. JEFFERY provided verbal education on free/reduced-cost therapy with [address]. JEFFERY will e-mail patient information on this practice ([email or web address]). Patient will then be responsible for contacting [facility] to schedule an initial appointment. Patient denied any additional needs from  at this time. Patient agreeable for  to call back next week to check-in. Patient also has this 's contact information.     CLARISSA Alcala  [facility] [phone]

## 2021-11-03 ENCOUNTER — HOSPITAL ENCOUNTER (OUTPATIENT)
Age: 36
Discharge: HOME | End: 2021-11-03
Attending: OBSTETRICS & GYNECOLOGY | Admitting: OBSTETRICS & GYNECOLOGY
Payer: COMMERCIAL

## 2021-11-03 VITALS
RESPIRATION RATE: 18 BRPM | DIASTOLIC BLOOD PRESSURE: 61 MMHG | BODY MASS INDEX: 27.29 KG/M2 | OXYGEN SATURATION: 99 % | HEIGHT: 63 IN | TEMPERATURE: 97.7 F | HEART RATE: 103 BPM | WEIGHT: 154 LBS | SYSTOLIC BLOOD PRESSURE: 121 MMHG

## 2021-11-03 PROBLEM — N89.8 VAGINAL DISCHARGE IN PREGNANCY IN SECOND TRIMESTER: Status: ACTIVE | Noted: 2021-11-03

## 2021-11-03 PROBLEM — O26.892 VAGINAL DISCHARGE IN PREGNANCY IN SECOND TRIMESTER: Status: ACTIVE | Noted: 2021-11-03

## 2021-11-03 PROCEDURE — 99283 EMERGENCY DEPT VISIT LOW MDM: CPT

## 2021-11-03 PROCEDURE — 96372 THER/PROPH/DIAG INJ SC/IM: CPT

## 2021-11-03 PROCEDURE — 96374 THER/PROPH/DIAG INJ IV PUSH: CPT

## 2021-11-03 PROCEDURE — 74011250636 HC RX REV CODE- 250/636: Performed by: OBSTETRICS & GYNECOLOGY

## 2021-11-03 PROCEDURE — 74011000250 HC RX REV CODE- 250: Performed by: OBSTETRICS & GYNECOLOGY

## 2021-11-03 PROCEDURE — 96360 HYDRATION IV INFUSION INIT: CPT

## 2021-11-03 PROCEDURE — 76815 OB US LIMITED FETUS(S): CPT

## 2021-11-03 RX ADMIN — SODIUM CHLORIDE 25 MG: 9 INJECTION INTRAMUSCULAR; INTRAVENOUS; SUBCUTANEOUS at 20:05

## 2021-11-03 RX ADMIN — RHO(D) IMMUNE GLOBULIN (HUMAN) 0.3 MG: 1500 SOLUTION INTRAMUSCULAR at 20:05

## 2021-11-03 RX ADMIN — SODIUM CHLORIDE, SODIUM LACTATE, POTASSIUM CHLORIDE, AND CALCIUM CHLORIDE 1000 ML: 600; 310; 30; 20 INJECTION, SOLUTION INTRAVENOUS at 20:05

## 2021-11-03 NOTE — PROGRESS NOTES
Pt presents to [facility] complaining of abdominal cramping lower back pain and spotting. Dr Margot Cueva notified.

## 2021-11-04 ENCOUNTER — DOCUMENTATION ONLY (OUTPATIENT)
Dept: CASE MANAGEMENT | Age: 36
End: 2021-11-04

## 2021-11-04 PROBLEM — Z3A.18 18 WEEKS GESTATION OF PREGNANCY: Status: RESOLVED | Noted: 2021-11-04 | Resolved: 2021-11-04

## 2021-11-04 PROBLEM — O60.02 PRETERM LABOR IN SECOND TRIMESTER: Status: ACTIVE | Noted: 2021-11-04

## 2021-11-04 PROBLEM — O26.892 ABDOMINAL PAIN DURING PREGNANCY IN SECOND TRIMESTER: Status: ACTIVE | Noted: 2021-11-04

## 2021-11-04 PROBLEM — N89.8 VAGINAL DISCHARGE IN PREGNANCY IN SECOND TRIMESTER: Status: RESOLVED | Noted: 2021-11-03 | Resolved: 2021-11-04

## 2021-11-04 PROBLEM — R10.9 ABDOMINAL PAIN DURING PREGNANCY IN SECOND TRIMESTER: Status: ACTIVE | Noted: 2021-11-04

## 2021-11-04 PROBLEM — O26.892 VAGINAL DISCHARGE IN PREGNANCY IN SECOND TRIMESTER: Status: RESOLVED | Noted: 2021-11-03 | Resolved: 2021-11-04

## 2021-11-04 PROBLEM — O21.1 HYPEREMESIS GRAVIDARUM BEFORE END OF 22 WEEK GESTATION WITH ELECTROLYTE IMBALANCE: Status: RESOLVED | Noted: 2021-09-30 | Resolved: 2021-11-04

## 2021-11-04 PROBLEM — Z3A.18 18 WEEKS GESTATION OF PREGNANCY: Status: ACTIVE | Noted: 2021-11-04

## 2021-11-04 NOTE — H&P
History & Physical    Name: Maura Allen MRN: [id number]  SSN: [id number]    YOB: 1985  Age: 31 y.o. Sex: female        Subjective: Vaginal spotting  30 yo [id number] presents at 17+6wks for a CC of vaginal spotting. She reported \"blood on the tissue twice. \" She did not have to wear a pad. She reports some lower abdominal cramping and pelvic pressure. Her bloot type is O neg. She has a h/o 38 wk vaginal twin delivery. Her pregnancy has been c/b hyperemesis, h/o habitual abortions, depression and GERD.       Estimated Date of Delivery: 22  OB History    Para Term  AB Living   12 1 1 0 10 2   SAB IAB Ectopic Molar Multiple Live Births   2 8     1 2      # Outcome Date GA Lbr Elvin/2nd Weight Sex Delivery Anes PTL Lv   12 Current            11 IAB            10 SAB 16 6w0d          9 IAB            8 SAB 14 7w0d          7 IAB            6 IAB 01/01/10 7w0d          5 IAB 09 7w0d          4 IAB 09 7w0d          3 IAB 08 7w0d          2A Term 07 38w0d  2.863 kg F Vag-Spont EPI Y BRUNO      Complications:  labor, Hyperemesis affecting pregnancy, antepartum   2B Term 07 38w0d  2.353 kg M Vag-Spont EPI Y BRUNO      Complications:  labor, Hyperemesis affecting pregnancy, antepartum   1 IAB 04 7w0d              Past Medical History:   Diagnosis Date    Anemia     Chlamydia 2006    GERD (gastroesophageal reflux disease) 10/16/2019    Gonorrhea 2006    High cholesterol     as a child    MDD (major depressive disorder), recurrent episode ([address] 10/16/2019    Polycystic disease, ovaries     Post concussion syndrome 10/16/2019    Post concussion syndrome 10/16/2019    Sickle cell trait syndrome ([facility]      Past Surgical History:   Procedure Laterality Date    HX DILATION AND CURETTAGE      x9     Social History     Occupational History    Not on file   Tobacco Use    Smoking status: Former Smoker    Smokeless tobacco: Never Used   Vaping Use    Vaping Use: Former   Substance and Sexual Activity    Alcohol use: No    Drug use: No    Sexual activity: Yes     Partners: Male     Birth control/protection: None     Family History   Problem Relation Age of Onset    Diabetes Father     Breast Problems Other     Cancer Paternal Grandfather         Prostate CA    Crohn's Disease Sister     Seizures Sister     Thyroid Disease Paternal Grandmother        Allergies   Allergen Reactions    Reglan [Metoclopramide] Other (comments)     Jittery       Prior to Admission medications    Medication Sig Start Date End Date Taking? Authorizing Provider   promethazine (PHENERGAN) 25 mg suppository Insert 1 Suppository into rectum every six (6) hours as needed for Nausea. 10/26/21  Yes Reji Chaney MD   butalbital-acetaminophen-caffeine (FIORICET, ESGIC) -40 mg per tablet Take 1 Tablet by mouth every six (6) hours as needed (severe headaches). 10/26/21  Yes Reji Chaney MD   famotidine (Pepcid) 20 mg tablet Take 20 mg by mouth two (2) times a day. Patient not taking: Reported on 9/30/2021    Provider, Historical   OTHER Zofran infusion  Patient not taking: Reported on 11/3/2021    Provider, Historical   prochlorperazine (COMPAZINE) 10 mg tablet Take 1 Tablet by mouth every six (6) hours as needed for Other (as needed for nausea/vomiting/migraine). Patient not taking: Reported on 9/30/2021 9/13/21   Manuel Venegas MD   ondansetron hcl [facility]) 8 mg tablet Take 1 Tablet by mouth every eight (8) hours as needed for Nausea or Vomiting. Patient not taking: Reported on 11/3/2021 8/16/21   Jessi Grant MD   PNV Comb #2-Iron-FA-Omega 3 29-1-400 mg cmpk Take  by mouth. Patient not taking: Reported on 11/3/2021    Provider, Historical        Review of Systems: Pertinent items are noted in HPI.  10 point ROS was also positive for c/o nausea, difficulty drinking fluids, fatigue, and general abdominal and lower back discomforts. She is asking for pain medication. Objective:     Vitals:  Vitals:    21 1859 21 1905   BP: 121/61    Pulse: (!) 103    Resp: 18    Temp: 97.7 °F (36.5 °C)    SpO2: 99%    Weight:  69.9 kg (154 lb)   Height:  5' 3\" (1.6 m)        Physical Exam:  Patient without distress. Back: costovertebral angle tenderness absent  Abdomen: soft, nontender  Fundus: soft and non tender  Perineum: blood absent, amniotic fluid absent  Lower Extremities:  - Edema No  SSE: NEFG, normal vaginal mucosa, no blood in the vaginal vault or at the cervical os, cvx LTC to visual inspection  Membranes:  Intact  Fetal Heart Rate: TAUS-active fetus, normal appearing fluid, anterior placenta appears wnl. TVUS was not done. Prenatal Labs:   Lab Results   Component Value Date/Time    Rubella, External immune 2021 12:00 AM    HBsAg, External negative 2021 12:00 AM    HIV, External NR 2021 12:00 AM    RPR, External NR 2021 12:00 AM    ABO,Rh O negative 2021 12:00 AM         Assessment/Plan: 32 yo  at 17+6 wks presents with reports of vaginal spotting. Normal pelvic exam, fetal assessment, and placental appearance. Pt with additional complaint of nausea. Active Problems:    Vaginal discharge in pregnancy in second trimester (11/3/2021)         Plan:    1. Rhogam due to reported vaginal bleeding and O neg status. 2. IVFs and IV Phenergan. Pt declined IV Zofran. 3. UA when able to void. Addendum:   UA wnl. Pt nausea improved with IVFs and IV Phenergan. Also reported constipation. Advised MOM nightly and Colace 200mg nightly until normal bowel routine established, then continue Colace and use MOM PRN. Keep OB f/u as scheduled.

## 2021-11-04 NOTE — DISCHARGE INSTRUCTIONS
Patient Education   Patient Education        Weeks 18 to 22 of Your Pregnancy: Care Instructions  Overview     Your baby is continuing to develop quickly. Sometime between 18 and 22 weeks, you'll probably start to feel your baby move. At first, these small fetal movements feel like fluttering or \"butterflies. \" Or they may feel like gas bubbles. As your baby grows, these movements will become stronger. You may also notice that your baby hiccups. Babies at this stage can now suck their thumbs. You may find that your nausea and fatigue are gone. You may feel better overall and have more energy than you did in your first trimester. But you might now also have some new discomforts, like sleep problems or leg cramps. Talk to your doctor about things you can do at home to ease these problems. Follow-up care is a key part of your treatment and safety. Be sure to make and go to all appointments, and call your doctor if you are having problems. It's also a good idea to know your test results and keep a list of the medicines you take. How can you care for yourself at home? Ease sleep problems  · Avoid caffeine in drinks or chocolate late in the day. · Get some exercise every day. · Take a warm shower or bath before bed. · Have a light snack or glass of milk at bedtime. · Do relaxation exercises in bed to calm your mind and body. · Support your legs and back with extra pillows. Try a pillow between your legs if you sleep on your side. · Do not use sleeping pills or alcohol. They could harm your baby. Ease leg cramps  · Do not massage your calf during the cramp. · Sit on a firm bed or chair. Straighten your leg, and bend your foot (flex your ankle) slowly upward, toward your knee. Bend your toes up and down. · Stand on a cool, flat surface. Stretch your toes upward, and take small steps walking on your heels. · Use a heating pad or hot water bottle to help with muscle ache.   Prevent leg cramps  · Be sure to get enough calcium. If you are worried that you are not getting enough, talk to your doctor. · Exercise every day, and stretch your legs before bed. · Take a warm bath before bed, and try leg warmers at night. Where can you learn more? Go to http://www.gray.com/  Enter G971 in the search box to learn more about \"Weeks 18 to 22 of Your Pregnancy: Care Instructions. \"  Current as of: 2021               Content Version: 13.0   [employer]. Care instructions adapted under license by [employer] (which disclaims liability or warranty for this information). If you have questions about a medical condition or this instruction, always ask your healthcare professional. [address] any warranty or liability for your use of this information. Pregnancy Precautions: Care Instructions  Your Care Instructions     There is no sure way to prevent labor before your due date ( labor) or to prevent most other pregnancy problems. But there are things you can do to increase your chances of a healthy pregnancy. Go to your appointments, follow your doctor's advice, and take good care of yourself. Eat well, and exercise (if your doctor agrees). And make sure to drink plenty of water. Follow-up care is a key part of your treatment and safety. Be sure to make and go to all appointments, and call your doctor if you are having problems. It's also a good idea to know your test results and keep a list of the medicines you take. How can you care for yourself at home? · Make sure you go to your prenatal appointments. At each visit, your doctor will check your blood pressure. Your doctor will also check to see if you have protein in your urine. High blood pressure and protein in urine are signs of preeclampsia. This condition can be dangerous for you and your baby. · Drink plenty of fluids. Dehydration can cause contractions.  If you have kidney, heart, or liver disease and have to limit fluids, talk with your doctor before you increase the amount of fluids you drink. · Tell your doctor right away if you notice any symptoms of an infection, such as:  ? Burning when you urinate. ? A foul-smelling discharge from your vagina. ? Vaginal itching. ? Unexplained fever. ? Unusual pain or soreness in your uterus or lower belly. · Eat a balanced diet. Include plenty of foods that are high in calcium and iron. ? Foods high in calcium include milk, cheese, yogurt, almonds, and broccoli. ? Foods high in iron include red meat, shellfish, poultry, eggs, beans, raisins, whole-grain bread, and leafy green vegetables. · Do not smoke. If you need help quitting, talk to your doctor about stop-smoking programs and medicines. These can increase your chances of quitting for good. · Do not drink alcohol or use marijuana or illegal drugs. · Follow your doctor's directions about activity. Your doctor will let you know how much, if any, exercise you can do. · Ask your doctor if you can have sex. If you are at risk for early labor, your doctor may ask you to not have sex. · Take care to prevent falls. During pregnancy, your joints are loose, and your balance is off. Sports such as bicycling, skiing, or in-line skating can increase your risk of falling. And don't ride horses or motorcycles, dive, water ski, scuba dive, or parachute jump while you are pregnant. · Avoid getting very hot. Do not use saunas or hot tubs. Avoid staying out in the sun in hot weather for long periods. Take acetaminophen (Tylenol) to lower a high fever. · Do not take any over-the-counter or herbal medicines or supplements without talking to your doctor or pharmacist first.  When should you call for help? Call 911  anytime you think you may need emergency care. For example, call if:    · You passed out (lost consciousness).     · You have a seizure.     · You have severe vaginal bleeding.   · You have severe pain in your belly or pelvis.     · You have had fluid gushing or leaking from your vagina and you know or think the umbilical cord is bulging into your vagina. If this happens, immediately get down on your knees so your rear end (buttocks) is higher than your head. This will decrease the pressure on the cord until help arrives. Call your doctor now or seek immediate medical care if:    · You have signs of preeclampsia, such as:  ? Sudden swelling of your face, hands, or feet. ? New vision problems (such as dimness, blurring, or seeing spots). ? A severe headache.     · You have any vaginal bleeding.     · You have belly pain or cramping.     · You have a fever.     · You have had regular contractions (with or without pain) for an hour. This means that you have 8 or more within 1 hour or 4 or more in 20 minutes after you change your position and drink fluids.     · You have a sudden release of fluid from your vagina.     · You have low back pain or pelvic pressure that does not go away.     · You notice that your baby has stopped moving or is moving much less than normal.   Watch closely for changes in your health, and be sure to contact your doctor if you have any problems. Where can you learn more? Go to http://www.SocialStay.com/  Enter Y951 in the search box to learn more about \"Pregnancy Precautions: Care Instructions. \"  Current as of: June 16, 2021               Content Version: 13.0  © 6441-6656 [employer]. Care instructions adapted under license by [employer] (which disclaims liability or warranty for this information). If you have questions about a medical condition or this instruction, always ask your healthcare professional. [place] [address] any warranty or liability for your use of this information.        Take milk of magnesia nightly per over the counter prescription until regular bowel pattern has been established. Take colace 100-200mg nightly to establish regular bowel patterns.

## 2021-11-04 NOTE — PROGRESS NOTES
Orders to discharge pt home. IV out. Tip intact. Discharge instructions reviewed. Pt verbalizes understanding. Pt wheeled to car with SO at side.

## 2021-11-04 NOTE — PROGRESS NOTES
SW met with patient during [facility] appointment. Discussed current stressors and obstacles patient is encountering on a regular basis. She continues to not feel well enough to go to her job at [employer], but the approval of her [facility] paperwork has not yet been finalized. Patient has a large/local support group of family; however, their support does not always prove to be beneficial.      Patient and her 15 y/o twins currently live with baby's father Nayla Bright. Sharona Kemp has a \"good job, & he works 7 days a week. \"  Patient is reluctant to leave her job at [employer] due to missed income and medical benefits. However, without patient's income, she may qualify for Medicaid and other income-based benefits. SW encouraged patient to practice regular self-care during this time. Information provided on several local therapy practices that accept [facility]:    - [facility] Arnold Colonland)   [phone]  o Accepting new patients (appointments as early next week)  o Telehealth appointments only    - [facility] Seth Winters)  [address] [place] [address], [address] [facility],[address]  [place], [address]  [phone]  [email or web address]  o Accepting new patients (3-week wait for new appointment)  o Offers in-person or telehealth appointments    - [facility]  [address] [facility],  [place], [address]  [phone]  o Accepting new patients (2-week wait for new appointment)  o Offers in-person or telehealth appointments     Additionally, patient provided with information on resources/support available thru [employer] ([employer]), including free online support groups and  Specialized Coordinator. Patient has previously been provided with information on low-cost therapy available thru [facility] [address] [facility]. Patient without any questions/needs at this time. Patient has this 's contact information. SW will continue to follow.     Joelle Comer. Jeffery Bang, [address] [facility]   [phone]

## 2021-11-05 ENCOUNTER — HOSPITAL ENCOUNTER (OUTPATIENT)
Dept: PHYSICAL THERAPY | Age: 36
Discharge: HOME | End: 2021-11-05
Attending: OBSTETRICS & GYNECOLOGY
Payer: COMMERCIAL

## 2021-11-05 DIAGNOSIS — M54.9 BACK PAIN AFFECTING PREGNANCY IN SECOND TRIMESTER: ICD-10-CM

## 2021-11-05 DIAGNOSIS — O99.891 BACK PAIN AFFECTING PREGNANCY IN SECOND TRIMESTER: ICD-10-CM

## 2021-11-05 PROCEDURE — 97110 THERAPEUTIC EXERCISES: CPT

## 2021-11-05 PROCEDURE — 97162 PT EVAL MOD COMPLEX 30 MIN: CPT

## 2021-11-05 NOTE — PROGRESS NOTES
Maura Allen  : 1985  Primary: [facility]*  Secondary:  Therapy Center at [address] [place] [address],[address], [address] [facility]  [phone]   [phone]        OUTPATIENT PHYSICAL THERAPY: Daily Treatment Note 2021  Visit Count:  1    ICD-10: Treatment Diagnosis:    Low back pain (M54.5)   Precautions/Allergies:   Reglan [metoclopramide]   TREATMENT PLAN:  Effective Dates: 2021 TO 2/3/2022 (90 days). Frequency/Duration: 2 times a week for 90 Day(s)    Pre-treatment Symptoms/Complaints:  2021: Patient reports she just has a hard time catching her breath and then with her back hurting too it just takes it out of her. Pain: Initial: Pain Intensity 1: 5  Pain Location 1: Back  Pain Orientation 1: Lower  Pain Intervention(s) 1: Rest, Medication (see MAR)  Post Session:  5/10   Medications Last Reviewed:  2021  Updated Objective Findings:  See evaluation note from today  TREATMENT:     THERAPEUTIC EXERCISE: (15 minutes):  Exercises per grid below to improve mobility and strength. Required minimal visual and verbal cues to promote proper body alignment, promote proper body posture and promote proper body mechanics. Progressed resistance, range, repetitions and complexity of movement as indicated. Date:  2021   Activity/Exercise Parameters   Single knee to chest stretch 5 second hold  5 reps  B LE  HEP   Active hamstring stretch 5 second hold  5 reps  B LE  HEP   SI belt Trial  HEP      Treatment/Session Summary:    · Response to Treatment:  Patient tolerated assessment without complaints of increased back pain. Patient verbalized and demonstrated understanding of HEP. · Communication/Consultation:  None today  · Equipment provided today:  None today  · Recommendations/Intent for next treatment session: Next visit will focus on improving overall mobility and pain with daily activities.     Total Treatment Billable Duration:  15 minutes + evaluation  PT Patient Time In/Time Out  Time In: 1300  Time Out: [address], PT    Visit # Therapist initials Date Arrived NS/ Cx < 24 hr >24 hr Cx Visit Comments   1 JS 11/5/2021 X    Initial Assessment and Treatment  No visit limit                                                                                                                                                              Abbreviations: NS = No Show; CX = cancelled     Future Appointments   Date Time Provider Manuel Sloane   11/5/2021  1:00 PM Benton Lombard, PT SFEORPT SFE   11/11/2021  8:00 AM [facility] ULTRASOUND 2 [facility]   11/11/2021  8:30 AM Renetta Rubi MD [facility]   11/23/2021  9:30 AM [facility] US 3 VD UPSG UPSG   11/23/2021 10:15 AM Obdulia Lima,  UPSG UPSG

## 2021-11-05 NOTE — THERAPY EVALUATION
John Tripathi  : 1985  Primary: [facility]*  Secondary:  Therapy Center at [address], [address] [place] [address],[address], [place] [address].  [phone]   [phone]          OUTPATIENT PHYSICAL THERAPY:Initial Assessment 2021   ICD-10: Treatment Diagnosis:   Low back pain (M54.5)   Precautions/Allergies:   Reglan [metoclopramide]   TREATMENT PLAN:  Effective Dates: 2021 TO 2/3/2022 (90 days). Frequency/Duration: 2 times a week for 90 Day(s) MEDICAL/REFERRING DIAGNOSIS:  Back pain affecting pregnancy in second trimester [O99.891, M54.9]   DATE OF ONSET: Chronic  REFERRING PHYSICIAN: Jeovany Adamson MD MD Orders: Evaluate and Treat  Return MD Appointment: TBD     INITIAL ASSESSMENT:  Ms. Denise Welch presents with decreased mobility, decreased strength, and pain in lower back secondary to pregnancy. After discussing with patient, she agreed she would benefit from physical therapy to improve above deficits. Please sign this plan of treatment if you concur. Thank you for the opportunity to serve this patient. PROBLEM LIST (Impacting functional limitations):  Decreased Strength  Decreased ADL/Functional Activities  Increased Pain  Decreased Activity Tolerance INTERVENTIONS PLANNED: (Treatment may consist of any combination of the following)  Cold  Heat  Home Exercise Program (HEP)  Manual Therapy  Neuromuscular Re-education/Strengthening  Range of Motion (ROM)  Therapeutic Activites  Therapeutic Exercise/Strengthening     GOALS: (Goals have been discussed and agreed upon with patient.)  Short-Term Functional Goals: Time Frame: 30 days  Patient will be independent with home exercise program without exacerbation of symptoms or cueing needed. Patient will be independent with correct sleeping positions and awareness/avoidance of aggravating positions without cueing needed.    Discharge Goals: Time Frame: 90 days  Patient will be independent with all ADLs with minimal onset of  back pain and no deficits with daily tasks. Patient will report no fear avoidance with social or recreational activities due to  back pain. Patient will score less than or equal to 20/50 on Modified Oswestry Lower Back Pain Questionnaire with minimal effect of  back pain on patient's ability to manage every day life activities. OUTCOME MEASURE:   Tool Used: Modified Oswestry Low Back Pain Questionnaire  Score:  Initial: 30/50  Most Recent: X/50 (Date: -- )   Interpretation of Score: Each section is scored on a 0-5 scale, 5 representing the greatest disability. The scores of each section are added together for a total score of 50. MEDICAL NECESSITY:   Patient is expected to demonstrate progress in strength and range of motion to improve safety during daily activities. Patient demonstrates good rehab potential due to higher previous functional level. Skilled intervention continues to be required due to decreased functional mobility. REASON FOR SERVICES/OTHER COMMENTS:  Patient continues to demonstrate capacity to improve overall functional mobility which will increase independence and increase safety. Total Duration:  PT Patient Time In/Time Out  Time In: 1300  Time Out: 1345    Rehabilitation Potential For Stated Goals: Good  Regarding [employer] therapy, I certify that the treatment plan above will be carried out by a therapist or under their direction. Thank you for this referral,  Khoa Brock, PT     Referring Physician Signature: Blanca Degroot MD _______________________________ Date _____________      PAIN/SUBJECTIVE:   Initial: Pain Intensity 1: 5  Pain Location 1: Back  Pain Orientation 1: Lower  Pain Intervention(s) 1: Rest, Medication (see MAR)  Post Session:  5/10   HISTORY:   History of Injury/Illness (Reason for Referral):  Patient reports every since she has been pregnant with this baby, her back and hips have been hurting.   She reports the pain stays mostly in her back, but does go down her left side almost to her toes at different times. She reports it is hard to get comfortable sleeping or walking or sitting. She reports she has not been able to work because of th pain in her back and leg as well as weakness she feels all the time. She reports she would just like for it to get better. Past Medical History/Comorbidities:   Ms. Aaron Akbar  has a past medical history of Anemia, Chlamydia (2006), GERD (gastroesophageal reflux disease) (10/16/2019), Gonorrhea (2006), High cholesterol, MDD (major depressive disorder), recurrent episode ([address] (10/16/2019), Polycystic disease, ovaries, Post concussion syndrome (10/16/2019), Post concussion syndrome (10/16/2019), Sickle cell trait syndrome ([address] and Vaginal discharge in pregnancy in second trimester (11/3/2021). She also has no past medical history of Abnormal Papanicolaou smear of cervix, Asthma, Breast disorder, Complication of anesthesia, Diabetes ([address] DVT (deep venous thrombosis) ([address] Eclampsia, Ectopic pregnancy, Epilepsy ([address] Essential hypertension, Genital herpes, Gestational diabetes, Gestational hypertension, Heart abnormality, Herpes gestationis, Herpes simplex virus (HSV) infection, Human immunodeficiency virus (HIV) disease ([address] Hypertension, Hyperthyroidism, Hypothyroidism, Infertility, female, Kidney disease, Liver disease, Molar pregnancy, Phlebitis and thrombophlebitis, Pituitary disorder ([address] Postpartum depression, Rhesus isoimmunization affecting pregnancy, Sickle cell disease ([address] Syphilis, Systemic lupus erythematosus ([address] Thyroid activity decreased, or Trauma. Ms. Aaron Akbar  has a past surgical history that includes hx dilation and curettage.   Social History/Living Environment:   Home Environment: Private residence  Living Alone: No  Support Systems: Spouse/Significant Other  Prior Level of Function/Work/Activity:  Independent  Dominant Side:         RIGHT  Personal Factors:          Sex:  female        Age:  31 y.o. Ambulatory/Rehab Services H2 Model Falls Risk Assessment   Risk Factors:       No Risk Factors Identified Ability to Rise from Chair:       (1)  Pushes up, successful in one attempt   Falls Prevention Plan:       No modifications necessary   Total: (5 or greater = High Risk): 1   ©2010 [facility] of [employer]. All Rights Reserved. [facility] Patent #[id number]. Federal Law prohibits the replication, distribution or use without written permission from [facility] [employer]   Current Medications:       Current Outpatient Medications:     indomethacin (INDOCIN) 50 mg capsule, Take 1 Capsule by mouth every six (6) hours for 12 doses. Indications: early labor, Disp: 12 Capsule, Rfl: 0    simethicone (MYLICON) 80 mg chewable tablet, Take 1 Tablet by mouth every six (6) hours as needed for GI Pain., Disp: 60 Tablet, Rfl: 5    promethazine (PHENERGAN) 25 mg suppository, Insert 1 Suppository into rectum every six (6) hours as needed for Nausea., Disp: 20 Suppository, Rfl: 5    butalbital-acetaminophen-caffeine (FIORICET, ESGIC) -40 mg per tablet, Take 1 Tablet by mouth every six (6) hours as needed (severe headaches). , Disp: 30 Tablet, Rfl: 3    famotidine (Pepcid) 20 mg tablet, Take 20 mg by mouth two (2) times a day. (Patient not taking: Reported on 9/30/2021), Disp: , Rfl:     OTHER, Zofran infusion (Patient not taking: Reported on 11/3/2021), Disp: , Rfl:     prochlorperazine (COMPAZINE) 10 mg tablet, Take 1 Tablet by mouth every six (6) hours as needed for Other (as needed for nausea/vomiting/migraine). (Patient not taking: Reported on 9/30/2021), Disp: 8 Tablet, Rfl: 1    ondansetron hcl (ZOFRAN) 8 mg tablet, Take 1 Tablet by mouth every eight (8) hours as needed for Nausea or Vomiting.  (Patient not taking: Reported on 11/3/2021), Disp: 20 Tablet, Rfl: 2    PNV Comb #2-Iron-FA-Omega 3 29-1-400 mg cmpk, Take  by mouth., Disp: , Rfl:    Date Last Reviewed: 2021    Number of Personal Factors/Comorbidities that affect the Plan of Care: 1-2: MODERATE COMPLEXITY   EXAMINATION:   Observation/Orthostatic Postural Assessment:          Posture Assessment: Rounded shoulders, Forward head   Palpation:          Mild tenderness to palpation noted along lower back and left SI rgion. ROM:          Lumbar Assessment (AROM):  Flexion: 50% of full AROM  Extension: 50% of full AROM  Right side bendin% of full AROM  Left side bending[de-identified] 50% of full AROM  Right rotation: 50% of full AROM  Left rotation: 50% of full AROM  Strength:          Lumbar Assessment (Strength):  Core strength: Grossly 2+/5 with manual muscle testing  Multifidus strength: Grossly 2+/5 with manual muscle testing  Special Tests:          Negative  Neurological Screen:        Dermatomes: Within normal limits        Reflexes:  2+  Functional Mobility:   Gait/Mobility:      Independent         Transfers:     Sit to Stand: Modified independent  Stand to Sit: Modified independent  Stand Pivot Transfers: Modified independent  Bed to Chair: Modified independent  Lateral Transfers: Modified independent         Bed Mobility:     Rolling: Modified independent, Additional time  Supine to Sit: Modified independent, Additional time  Sit to Supine: Modified independent, Additional time  Scooting: Modified independent, Additional time        Body Structures Involved:  Nerves  Joints  Muscles Body Functions Affected:  Sensory/Pain  Neuromusculoskeletal  Movement Related Activities and Participation Affected:   Mobility  Self Care   Number of elements (examined above) that affect the Plan of Care: 4+: HIGH COMPLEXITY   CLINICAL PRESENTATION:   Presentation: Evolving clinical presentation with changing clinical characteristics: MODERATE COMPLEXITY   CLINICAL DECISION MAKING:   Use of outcome tool(s) and clinical judgement create a POC that gives a: Questionable prediction of patient's progress: MODERATE COMPLEXITY

## 2021-11-11 PROBLEM — Z3A.19 19 WEEKS GESTATION OF PREGNANCY: Status: RESOLVED | Noted: 2021-11-11 | Resolved: 2021-11-11

## 2021-11-11 PROBLEM — Z3A.19 19 WEEKS GESTATION OF PREGNANCY: Status: ACTIVE | Noted: 2021-11-11

## 2021-11-12 ENCOUNTER — HOSPITAL ENCOUNTER (OUTPATIENT)
Dept: PHYSICAL THERAPY | Age: 36
Discharge: HOME | End: 2021-11-12
Attending: OBSTETRICS & GYNECOLOGY
Payer: COMMERCIAL

## 2021-11-12 PROCEDURE — 97140 MANUAL THERAPY 1/> REGIONS: CPT

## 2021-11-12 PROCEDURE — 97110 THERAPEUTIC EXERCISES: CPT

## 2021-11-12 NOTE — PROGRESS NOTES
Kelsey Giovanny  : 1985  Primary: Twyla Arguellone Of Nikole Mayes*  Secondary:  [facility] [place] [address] [facility], [address], [place] [address].  [phone]   [phone]        OUTPATIENT PHYSICAL THERAPY: Daily Treatment Note 2021  Visit Count:  Visit count could not be calculated. Make sure you are using a visit which is associated with an episode. ICD-10: Treatment Diagnosis:    Low back pain (M54.5)   Precautions/Allergies:   Reglan [metoclopramide]   TREATMENT PLAN:  Effective Dates: 2021 TO 2/3/2022 (90 days). Frequency/Duration: 2 times a week for 90 Day(s)    Pre-treatment Symptoms/Complaints:  2021: Patient reports she is hurting on her left side. Pain: Initial: Pain Intensity 1: 7  Pain Location 1: Back  Pain Orientation 1: Lower  Post Session:  5/10   Medications Last Reviewed:  2021  Updated Objective Findings:  None Today  TREATMENT:     THERAPEUTIC EXERCISE: (25 minutes):  Exercises per grid below to improve mobility and strength. Required minimal visual and verbal cues to promote proper body alignment, promote proper body posture and promote proper body mechanics. Progressed resistance, range, repetitions and complexity of movement as indicated. Date:  2021   Activity/Exercise Parameters   Single knee to chest stretch X   Supine hamstring stretch 3x30 sec hold bilateral   SI belt X   Nustep Level 1 x 5 minutes   Deep breathing on blue ball 5 minutes   Pelvic tilts on blue ball 2x10 reps   Marching on blue ball 2x10 reps             MANUAL THERAPY: (20 minutes): Soft tissue mobilization was utilized and necessary because of the patient's painful spasm. Laying on right side, patient received trigger point release along left piriformis/left SI region. Skin intact after treatment.         Treatment/Session Summary:    · Response to Treatment:  Patient reports less tightness after manual therapy. · Communication/Consultation:  None today  · Equipment provided today:  None today  · Recommendations/Intent for next treatment session: Next visit will focus on improving overall mobility and pain with daily activities.     Total Treatment Billable Duration:  45 minutes   PT Patient Time In/Time Out  Time In: 0845  Time Out: [address] PT    Visit # Therapist initials Date Arrived NS/ Cx < 24 hr >24 hr Cx Visit Comments   1 JS 11/5/2021 X    Initial Assessment and Treatment  No visit limit   2 PV 11/12/2021 X                                                                                                                                                        Abbreviations: NS = No Show; CX = cancelled     Future Appointments   Date Time Provider Manuel Anton   11/15/2021  8:45 AM Richar Mares, PT [facility] SFE   11/16/2021  2:30 PM [facility] ULTRASOUND 2 [facility]   11/16/2021  3:30 PM Elton Brizuela MD [facility]   11/17/2021  8:45 AM Marlene Burkitt, PT [facility] SFE   11/22/2021  8:45 AM VisBruce reynolds, PT S[facility]PT SFE   11/23/2021  9:30 AM UPS US 3 VD UPSG UPSG   11/23/2021 10:15 AM Alt, Aravind Nones, DO UPSG UPSG   11/29/2021  8:45 AM Marlene Burkitt, PT [facility] SFE   12/2/2021  8:45 AM VissaBruce kay Pole, PT SFE[facility]PT SFE   12/6/2021  8:45 AM VisBruce reynolds Pole, PT SFE[facility]PT SFE   12/9/2021  8:45 AM Marlene Burkitt, PT [facility] SFE   12/14/2021  8:45 AM VissaBruce kay Pole, PT SFEORPT SFE   12/16/2021  8:45 AM VisBruce reynolds Pole, PT SFEORPT SFE   12/21/2021  8:45 AM Marlene Burkitt, PT [facility] SFE   12/23/2021  8:45 AM Marlene Burkitt, PT [facility]PT SFE   12/27/2021  8:45 AM Marlene Burkitt, PT [facility] SFE   12/30/2021  8:45 AM Bruce Mares, PT [facility]

## 2021-11-15 ENCOUNTER — HOSPITAL ENCOUNTER (OUTPATIENT)
Dept: PHYSICAL THERAPY | Age: 36
Discharge: HOME | End: 2021-11-15
Attending: OBSTETRICS & GYNECOLOGY
Payer: COMMERCIAL

## 2021-11-15 PROCEDURE — 97140 MANUAL THERAPY 1/> REGIONS: CPT

## 2021-11-15 PROCEDURE — 97110 THERAPEUTIC EXERCISES: CPT

## 2021-11-15 NOTE — PROGRESS NOTES
Arliss Phalen  : 1985  Primary: Isabela Saini Of Nikole Mayes*  Secondary:  [facility] at [address] [place] [address], [address] [place] [address].  [phone]   [phone]        OUTPATIENT PHYSICAL THERAPY: Daily Treatment Note 11/15/2021  Visit Count:  Visit count could not be calculated. Make sure you are using a visit which is associated with an episode. ICD-10: Treatment Diagnosis:    Low back pain (M54.5)   Precautions/Allergies:   Reglan [metoclopramide]   TREATMENT PLAN:  Effective Dates: 2021 TO 2/3/2022 (90 days). Frequency/Duration: 2 times a week for 90 Day(s)    Pre-treatment Symptoms/Complaints:  11/15/2021: Patient reports she went grocery shopping yesterday and that was painful. \"I go to see the doctor tomorrow and I am scared they are going to put me on bedrest.  I have been having contractions\". Pain: Initial: Pain Intensity 1: 4  Pain Location 1: Back  Pain Orientation 1: Lower  Post Session:  3/10   Medications Last Reviewed:  11/15/2021  Updated Objective Findings:  None Today  TREATMENT:     THERAPEUTIC EXERCISE: (25 minutes):  Exercises per grid below to improve mobility and strength. Required minimal visual and verbal cues to promote proper body alignment, promote proper body posture and promote proper body mechanics. Progressed resistance, range, repetitions and complexity of movement as indicated. Date:  11/15/2021   Activity/Exercise Parameters   Single knee to chest stretch X   Supine hamstring stretch 3x30 sec hold bilateral with assist   SI belt X   Nustep Level 1 x 6 minutes   Deep breathing on blue ball 20 reps   Pelvic tilts on blue ball 2x10 reps   Marching on blue ball 2x10 reps   Supine piriformis stretch 3x30 sec hold bilateral with assist         MANUAL THERAPY: (20 minutes): Soft tissue mobilization was utilized and necessary because of the patient's painful spasm.    Laying on right side, patient received trigger point release along left piriformis/left SI region. Skin intact after treatment. Treatment/Session Summary:    · Response to Treatment:  Patient tolerated treatment well. Patient reports decreased pain after treatment. · Communication/Consultation:  None today  · Equipment provided today:  None today  · Recommendations/Intent for next treatment session: Next visit will focus on improving overall mobility and pain with daily activities.     Total Treatment Billable Duration:  45 minutes   PT Patient Time In/Time Out  Time In: 0845  Time Out: [address] PT    Visit # Therapist initials Date Arrived NS/ Cx < 24 hr >24 hr Cx Visit Comments   1 JS 11/5/2021 X    Initial Assessment and Treatment  No visit limit   2 PV 11/12/2021 X      3 PV 11/15/2021 X                                                                                                                                               Abbreviations: NS = No Show; CX = cancelled     Future Appointments   Date Time Provider Manuel Anton   11/16/2021  2:30 PM [facility] ULTRASOUND 2 [facility]   11/16/2021  3:30 PM Amy Goddard MD [facility]   11/17/2021  8:45 AM Ortiz Shaver, PT [facility] SFE   11/22/2021  8:45 AM Vissage, Alexandra Ream, PT [facility]PT SFE   11/23/2021  9:30 AM UPS US 3 VD UPSG UPSG   11/23/2021 10:15 AM Alt, Sujata Perish, DO UPSG UPSG   11/29/2021  8:45 AM Ortiz Shaver, PT [facility]PT SFE   12/2/2021  8:45 AM Vissage, Alexandra Ream, PT S[facility]PT SFE   12/6/2021  8:45 AM Vissage, Alexandra Ream, PT [facility] SFE   12/9/2021  8:45 AM Ortiz Shaver, PT [facility]PT SFE   12/14/2021  8:45 AM Vissage, Alexandra Ream, PT [facility]PT SFE   12/16/2021  8:45 AM Vissage, Alexandra Ream, PT [facility]PT SFE   12/21/2021  8:45 AM Ortiz Shaver, PT [facility]PT SFE   12/23/2021  8:45 AM Ortiz Shaver, PT [facility]PT SFE   12/27/2021  8:45 AM CHAY Mon   12/30/2021  8:45 AM Alexandra Mares, CH[facility] REA

## 2021-11-16 ENCOUNTER — HOSPITAL ENCOUNTER (OUTPATIENT)
Age: 36
Setting detail: OBSERVATION
LOS: 1 days | Discharge: HOME | End: 2021-11-18
Attending: OBSTETRICS & GYNECOLOGY | Admitting: OBSTETRICS & GYNECOLOGY
Payer: COMMERCIAL

## 2021-11-16 DIAGNOSIS — O26.892 PREGNANCY HEADACHE IN SECOND TRIMESTER: ICD-10-CM

## 2021-11-16 DIAGNOSIS — O21.0 HYPEREMESIS AFFECTING PREGNANCY, ANTEPARTUM: ICD-10-CM

## 2021-11-16 DIAGNOSIS — O09.212 CURRENT PREGNANCY WITH HISTORY OF PRE-TERM LABOR IN SECOND TRIMESTER: ICD-10-CM

## 2021-11-16 DIAGNOSIS — R51.9 PREGNANCY HEADACHE IN SECOND TRIMESTER: ICD-10-CM

## 2021-11-16 DIAGNOSIS — O26.892 ABDOMINAL PAIN DURING PREGNANCY IN SECOND TRIMESTER: ICD-10-CM

## 2021-11-16 DIAGNOSIS — O60.02 PRETERM LABOR IN SECOND TRIMESTER WITHOUT DELIVERY: ICD-10-CM

## 2021-11-16 DIAGNOSIS — O26.879 SHORT CERVIX AFFECTING PREGNANCY: ICD-10-CM

## 2021-11-16 DIAGNOSIS — R10.9 ABDOMINAL PAIN DURING PREGNANCY IN SECOND TRIMESTER: ICD-10-CM

## 2021-11-16 DIAGNOSIS — O09.92 HIGH-RISK PREGNANCY IN SECOND TRIMESTER: ICD-10-CM

## 2021-11-16 LAB
AMPHET UR QL SCN: NEGATIVE
APPEARANCE UR: ABNORMAL
BACTERIA URNS QL MICRO: 0 /HPF
BARBITURATES UR QL SCN: POSITIVE
BENZODIAZ UR QL: NEGATIVE
BILIRUB UR QL: NEGATIVE
CANNABINOIDS UR QL SCN: NEGATIVE
CASTS URNS QL MICRO: ABNORMAL /LPF
COCAINE UR QL SCN: NEGATIVE
COLOR UR: YELLOW
EPI CELLS #/AREA URNS HPF: ABNORMAL /HPF
ERYTHROCYTE [DISTWIDTH] IN BLOOD BY AUTOMATED COUNT: 13.1 % (ref 11.9–14.6)
GLUCOSE UR STRIP.AUTO-MCNC: 100 MG/DL
HCT VFR BLD AUTO: 37.1 % (ref 35.8–46.3)
HGB BLD-MCNC: 12.3 G/DL (ref 11.7–15.4)
HGB UR QL STRIP: ABNORMAL
KETONES UR QL STRIP.AUTO: >80 MG/DL
LEUKOCYTE ESTERASE UR QL STRIP.AUTO: NEGATIVE
MCH RBC QN AUTO: 30.7 PG (ref 26.1–32.9)
MCHC RBC AUTO-ENTMCNC: 33.2 G/DL (ref 31.4–35)
MCV RBC AUTO: 92.5 FL (ref 79.6–97.8)
METHADONE UR QL: NEGATIVE
NITRITE UR QL STRIP.AUTO: NEGATIVE
NRBC # BLD: 0 K/UL (ref 0–0.2)
OPIATES UR QL: NEGATIVE
PCP UR QL: NEGATIVE
PH UR STRIP: 6 [PH] (ref 5–9)
PLATELET # BLD AUTO: 264 K/UL (ref 150–450)
PMV BLD AUTO: 9.5 FL (ref 9.4–12.3)
PROT UR STRIP-MCNC: NEGATIVE MG/DL
RBC # BLD AUTO: 4.01 M/UL (ref 4.05–5.2)
RBC #/AREA URNS HPF: ABNORMAL /HPF
SP GR UR REFRACTOMETRY: 1.02 (ref 1–1.02)
UROBILINOGEN UR QL STRIP.AUTO: 1 EU/DL (ref 0.2–1)
WBC # BLD AUTO: 11.4 K/UL (ref 4.3–11.1)
WBC URNS QL MICRO: ABNORMAL /HPF

## 2021-11-16 PROCEDURE — G0378 HOSPITAL OBSERVATION PER HR: HCPCS

## 2021-11-16 PROCEDURE — 74011250637 HC RX REV CODE- 250/637: Performed by: OBSTETRICS & GYNECOLOGY

## 2021-11-16 PROCEDURE — 87086 URINE CULTURE/COLONY COUNT: CPT

## 2021-11-16 PROCEDURE — 81001 URINALYSIS AUTO W/SCOPE: CPT

## 2021-11-16 PROCEDURE — 85027 COMPLETE CBC AUTOMATED: CPT

## 2021-11-16 PROCEDURE — 96375 TX/PRO/DX INJ NEW DRUG ADDON: CPT

## 2021-11-16 PROCEDURE — 74011250636 HC RX REV CODE- 250/636: Performed by: OBSTETRICS & GYNECOLOGY

## 2021-11-16 PROCEDURE — 74011000250 HC RX REV CODE- 250: Performed by: OBSTETRICS & GYNECOLOGY

## 2021-11-16 PROCEDURE — 80307 DRUG TEST PRSMV CHEM ANLYZR: CPT

## 2021-11-16 PROCEDURE — 96374 THER/PROPH/DIAG INJ IV PUSH: CPT

## 2021-11-16 RX ORDER — SODIUM CHLORIDE, SODIUM LACTATE, POTASSIUM CHLORIDE, CALCIUM CHLORIDE 600; 310; 30; 20 MG/100ML; MG/100ML; MG/100ML; MG/100ML
150 INJECTION, SOLUTION INTRAVENOUS CONTINUOUS
Status: DISCONTINUED | OUTPATIENT
Start: 2021-11-16 | End: 2021-11-17

## 2021-11-16 RX ORDER — HYDROCODONE BITARTRATE AND ACETAMINOPHEN 5; 325 MG/1; MG/1
2 TABLET ORAL
Status: DISCONTINUED | OUTPATIENT
Start: 2021-11-16 | End: 2021-11-17

## 2021-11-16 RX ORDER — DEXTROSE, SODIUM CHLORIDE, SODIUM LACTATE, POTASSIUM CHLORIDE, AND CALCIUM CHLORIDE 5; .6; .31; .03; .02 G/100ML; G/100ML; G/100ML; G/100ML; G/100ML
1000 INJECTION, SOLUTION INTRAVENOUS CONTINUOUS
Status: DISCONTINUED | OUTPATIENT
Start: 2021-11-16 | End: 2021-11-17

## 2021-11-16 RX ORDER — INDOMETHACIN 50 MG/1
50 CAPSULE ORAL EVERY 6 HOURS
Status: DISCONTINUED | OUTPATIENT
Start: 2021-11-16 | End: 2021-11-18 | Stop reason: HOSPADM

## 2021-11-16 RX ORDER — INDOMETHACIN 50 MG/1
50 CAPSULE ORAL EVERY 6 HOURS
Status: DISCONTINUED | OUTPATIENT
Start: 2021-11-16 | End: 2021-11-16 | Stop reason: SDUPTHER

## 2021-11-16 RX ORDER — SODIUM CHLORIDE 0.9 % (FLUSH) 0.9 %
5-40 SYRINGE (ML) INJECTION EVERY 8 HOURS
Status: DISCONTINUED | OUTPATIENT
Start: 2021-11-16 | End: 2021-11-18 | Stop reason: HOSPADM

## 2021-11-16 RX ORDER — SODIUM CHLORIDE 0.9 % (FLUSH) 0.9 %
5-40 SYRINGE (ML) INJECTION EVERY 8 HOURS
Status: DISCONTINUED | OUTPATIENT
Start: 2021-11-16 | End: 2021-11-17

## 2021-11-16 RX ORDER — INDOMETHACIN 50 MG/1
50 CAPSULE ORAL EVERY 6 HOURS
Status: DISCONTINUED | OUTPATIENT
Start: 2021-11-16 | End: 2021-11-17

## 2021-11-16 RX ORDER — DEXTROSE, SODIUM CHLORIDE, SODIUM LACTATE, POTASSIUM CHLORIDE, AND CALCIUM CHLORIDE 5; .6; .31; .03; .02 G/100ML; G/100ML; G/100ML; G/100ML; G/100ML
125 INJECTION, SOLUTION INTRAVENOUS CONTINUOUS
Status: DISCONTINUED | OUTPATIENT
Start: 2021-11-16 | End: 2021-11-17

## 2021-11-16 RX ORDER — FLUCONAZOLE 100 MG/1
150 TABLET ORAL
Status: COMPLETED | OUTPATIENT
Start: 2021-11-16 | End: 2021-11-16

## 2021-11-16 RX ORDER — BUTORPHANOL TARTRATE 2 MG/ML
1 INJECTION INTRAMUSCULAR; INTRAVENOUS ONCE
Status: COMPLETED | OUTPATIENT
Start: 2021-11-16 | End: 2021-11-16

## 2021-11-16 RX ORDER — SODIUM CHLORIDE 0.9 % (FLUSH) 0.9 %
5-40 SYRINGE (ML) INJECTION AS NEEDED
Status: DISCONTINUED | OUTPATIENT
Start: 2021-11-16 | End: 2021-11-18 | Stop reason: HOSPADM

## 2021-11-16 RX ADMIN — BUTORPHANOL TARTRATE 1 MG: 2 INJECTION, SOLUTION INTRAMUSCULAR; INTRAVENOUS at 18:21

## 2021-11-16 RX ADMIN — INDOMETHACIN 50 MG: 50 CAPSULE ORAL at 18:00

## 2021-11-16 RX ADMIN — SODIUM CHLORIDE, SODIUM LACTATE, POTASSIUM CHLORIDE, AND CALCIUM CHLORIDE 150 ML/HR: 600; 310; 30; 20 INJECTION, SOLUTION INTRAVENOUS at 19:33

## 2021-11-16 RX ADMIN — INDOMETHACIN 50 MG: 50 CAPSULE ORAL at 23:30

## 2021-11-16 RX ADMIN — FLUCONAZOLE 150 MG: 100 TABLET ORAL at 18:45

## 2021-11-16 RX ADMIN — SODIUM CHLORIDE 25 MG: 9 INJECTION INTRAMUSCULAR; INTRAVENOUS; SUBCUTANEOUS at 19:32

## 2021-11-16 NOTE — PROGRESS NOTES
46 Pt presents to anti partum from [facility] office. She appears to be uncomfortable and was placed in room 464. toco applied B/P obtained. 1800 Indocin po given, iv started Lr at 150 cc cbc drawn and sent to lab. Stadol 1 mg iv given for pain   1838 Dr Crow Diehl on phone request an uds and ua. Making note of pt having stadol.

## 2021-11-17 ENCOUNTER — HOSPITAL ENCOUNTER (OUTPATIENT)
Dept: PHYSICAL THERAPY | Age: 36
Discharge: HOME | End: 2021-11-17
Attending: OBSTETRICS & GYNECOLOGY
Payer: COMMERCIAL

## 2021-11-17 PROCEDURE — 74011250637 HC RX REV CODE- 250/637: Performed by: OBSTETRICS & GYNECOLOGY

## 2021-11-17 PROCEDURE — 99223 1ST HOSP IP/OBS HIGH 75: CPT | Performed by: OBSTETRICS & GYNECOLOGY

## 2021-11-17 PROCEDURE — G0378 HOSPITAL OBSERVATION PER HR: HCPCS

## 2021-11-17 PROCEDURE — 76817 TRANSVAGINAL US OBSTETRIC: CPT | Performed by: OBSTETRICS & GYNECOLOGY

## 2021-11-17 PROCEDURE — 74011250636 HC RX REV CODE- 250/636: Performed by: OBSTETRICS & GYNECOLOGY

## 2021-11-17 PROCEDURE — 74011000250 HC RX REV CODE- 250: Performed by: OBSTETRICS & GYNECOLOGY

## 2021-11-17 PROCEDURE — 76815 OB US LIMITED FETUS(S): CPT | Performed by: OBSTETRICS & GYNECOLOGY

## 2021-11-17 PROCEDURE — 96376 TX/PRO/DX INJ SAME DRUG ADON: CPT

## 2021-11-17 PROCEDURE — 99226 PR SBSQ OBSERVATION CARE/DAY 35 MINUTES: CPT | Performed by: OBSTETRICS & GYNECOLOGY

## 2021-11-17 PROCEDURE — 99356 PR PROLONGED SVC I/P OR OBS SETTING 1ST HOUR: CPT | Performed by: OBSTETRICS & GYNECOLOGY

## 2021-11-17 RX ORDER — ZOLPIDEM TARTRATE 5 MG/1
5 TABLET ORAL
Status: DISCONTINUED | OUTPATIENT
Start: 2021-11-17 | End: 2021-11-18 | Stop reason: HOSPADM

## 2021-11-17 RX ORDER — CALCIUM CARBONATE 200(500)MG
200 TABLET,CHEWABLE ORAL 2 TIMES DAILY WITH MEALS
Status: DISCONTINUED | OUTPATIENT
Start: 2021-11-17 | End: 2021-11-18 | Stop reason: HOSPADM

## 2021-11-17 RX ORDER — LANOLIN ALCOHOL/MO/W.PET/CERES
400 CREAM (GRAM) TOPICAL 2 TIMES DAILY
Status: DISCONTINUED | OUTPATIENT
Start: 2021-11-17 | End: 2021-11-18 | Stop reason: HOSPADM

## 2021-11-17 RX ORDER — HYDROXYZINE PAMOATE 25 MG/1
25 CAPSULE ORAL
Status: DISCONTINUED | OUTPATIENT
Start: 2021-11-17 | End: 2021-11-18 | Stop reason: HOSPADM

## 2021-11-17 RX ORDER — PROMETHAZINE HYDROCHLORIDE 25 MG/1
25 SUPPOSITORY RECTAL
Status: DISCONTINUED | OUTPATIENT
Start: 2021-11-17 | End: 2021-11-18 | Stop reason: HOSPADM

## 2021-11-17 RX ORDER — PROMETHAZINE HYDROCHLORIDE 25 MG/1
25 TABLET ORAL
Status: DISCONTINUED | OUTPATIENT
Start: 2021-11-17 | End: 2021-11-18 | Stop reason: HOSPADM

## 2021-11-17 RX ORDER — NIFEDIPINE 30 MG/1
30 TABLET, EXTENDED RELEASE ORAL 2 TIMES DAILY
Status: DISCONTINUED | OUTPATIENT
Start: 2021-11-18 | End: 2021-11-18 | Stop reason: HOSPADM

## 2021-11-17 RX ORDER — NIFEDIPINE 10 MG/1
10 CAPSULE ORAL ONCE
Status: ACTIVE | OUTPATIENT
Start: 2021-11-17 | End: 2021-11-18

## 2021-11-17 RX ADMIN — HYDROCODONE BITARTRATE AND ACETAMINOPHEN 2 TABLET: 5; 325 TABLET ORAL at 14:16

## 2021-11-17 RX ADMIN — SODIUM CHLORIDE 25 MG: 9 INJECTION INTRAMUSCULAR; INTRAVENOUS; SUBCUTANEOUS at 12:19

## 2021-11-17 RX ADMIN — SODIUM CHLORIDE, SODIUM LACTATE, POTASSIUM CHLORIDE, AND CALCIUM CHLORIDE 150 ML/HR: 600; 310; 30; 20 INJECTION, SOLUTION INTRAVENOUS at 05:56

## 2021-11-17 RX ADMIN — CALCIUM CARBONATE 200 MG: 500 TABLET, CHEWABLE ORAL at 18:31

## 2021-11-17 RX ADMIN — INDOMETHACIN 50 MG: 50 CAPSULE ORAL at 23:13

## 2021-11-17 RX ADMIN — ZOLPIDEM TARTRATE 5 MG: 5 TABLET ORAL at 23:14

## 2021-11-17 RX ADMIN — SODIUM CHLORIDE 25 MG: 9 INJECTION INTRAMUSCULAR; INTRAVENOUS; SUBCUTANEOUS at 05:56

## 2021-11-17 RX ADMIN — HYDROCODONE BITARTRATE AND ACETAMINOPHEN 2 TABLET: 5; 325 TABLET ORAL at 09:44

## 2021-11-17 RX ADMIN — INDOMETHACIN 50 MG: 50 CAPSULE ORAL at 12:14

## 2021-11-17 RX ADMIN — PROMETHAZINE HYDROCHLORIDE 25 MG: 25 TABLET ORAL at 14:02

## 2021-11-17 RX ADMIN — HYDROXYZINE PAMOATE 25 MG: 25 CAPSULE ORAL at 14:15

## 2021-11-17 RX ADMIN — PROMETHAZINE HYDROCHLORIDE 25 MG: 25 TABLET ORAL at 19:42

## 2021-11-17 RX ADMIN — INDOMETHACIN 50 MG: 50 CAPSULE ORAL at 05:50

## 2021-11-17 RX ADMIN — PROGESTERONE 90 MG: 90 GEL VAGINAL at 23:13

## 2021-11-17 RX ADMIN — Medication 400 MG: at 18:36

## 2021-11-17 RX ADMIN — INDOMETHACIN 50 MG: 50 CAPSULE ORAL at 18:31

## 2021-11-17 NOTE — PROGRESS NOTES
Casie Martines  : 1985  Primary: Luis Morrison Of [facility] Gerber*  Secondary:  [facility] [place] [address], [place] [address].  [phone]   [phone]          OUTPATIENT DAILY NOTE    NAME/AGE/GENDER: Casie Martines is a 31 y.o. female. DATE: 2021    Patient cancelled (less than 24 hours ago) her appointment for today due to in hospital.  Will plan to follow up on next scheduled visit.     Chase Reddy PT    Future Appointments   Date Time Provider Manuel Anton   2021  8:45 AM Cesar Holley PT [facility] SFE   2021  2:15 PM [facility] ULTRASOUND 1 [facility]   2021  3:15 PM Lang Ortiz MD [facility]   2021  8:45 AM Soco Overcast, PT S[facility]PT SFE   2021  8:45 AM Vissage, Yesika Knife, PT [facility]PT SFE   2021  8:45 AM Vissage, Yesika Knife, PT [facility]PT SFE   2021  8:45 AM Soco Overcast, PT [facility] SFE   2021  8:45 AM Vissage, Yesika Knife, PT [facility] SFE   2021  8:45 AM Vissage, Yesika Knife, PT [facility]PT SFE   2021  8:45 AM Soco Overcast, PT S[facility]PT SFE   2021  8:45 AM Soco Overcast, PT [facility]PT SFE   2021  8:45 AM Soco Overcast, PT [facility]PT SFE   2021  8:45 AM Vissage, Yesika Knife, PT S[facility]PT SFE

## 2021-11-17 NOTE — PROGRESS NOTES
Shift assessment complete. See flowsheet for details. POC reviewed with pt and pt verbalized understanding. Pt drowsy after stadol and phenergan administration (see MAR). Pt aware we need urine sample but inable to void at this time. Pt oriented to room and has call light within reach. Pt denies any needs at this time but will call out PRN. Pt now quietly resting in bed.

## 2021-11-17 NOTE — PROGRESS NOTES
Patient known to  due to ongoing case management during pregnancy. 1140:  SW attempted to meet with patient; patient sleeping. SW will come back. 1500:  SW attempted to meet with patient; patient resting and agreeable for SW to come back later.     CLARISSA De Leon, [address] [facility]   [phone]

## 2021-11-17 NOTE — PROGRESS NOTES
Maternal Fetal Medicine Inpatient Consult Note    Chief Complaint:  Pregnancy and abdominal pain  . History of Present Illness: 31 y.o.  at 19w6d gestation who presented to [facility] office on 2021 for a scheduled appt. Pt found to have cervical shortening with intraamniotic debris. She complains of abdominopelvic pain with pelvic pressure with activity. Pt with long history of pelvic pain/contractions throughout this pregnancy. Pt had term delivery in  of twins. Multiple first trimester pregnancies subsequently- losses/terminations with D&Cs in first trimester. Each pregnancy complicated by significant hyperemesis. Thus far this pregnancy, pt has had hyperemesis which required zofran pump beginning ~6 weeks. This was discontinued at ~17wk due to constipation effects. Once zofran discontinued, bowel activity normalized. Currently pt's n/v is controlled with phenergan DC. Pain began late first trimester- worst with anything touching/pressure against uterus. Maternity support belt aggravates. Activity and work (standing [facility] [employer]) worsen pain. If lays with a pillow against abdomen it improves. On toco, uterine irritability noted periodically but not consistently. With activity (grocery shopping, care for her kids), pressure and pain worsen. No VB. No LOF. No change discharge. Cervical length has progressively shortened over the past several weeks. Still >2cm. Intraamniotic debris new . Decision made to admit for observation and work on etiology and care of pain    Patient denies HA, upper abdominal pain, vision changes. Good FM. Minimal edema. No chest pain or shortness of breath. No significant reflux, nausea, constipation, or other GI complaints. Review of Systems:  A comprehensive review of systems was negative except for that written in the HPI.      History:  OB History    Para Term  AB Living   12 1 1 0 10 2   SAB IAB Ectopic Molar Multiple Live Births   2 8     1 2      # Outcome Date GA Lbr Elvin/2nd Weight Sex Delivery Anes PTL Lv   12 Current            11 IAB            10 SAB 16 6w0d          9 IAB            8 SAB 14 7w0d          7 IAB            6 IAB 01/01/10 7w0d          5 IAB 09 7w0d          4 IAB 09 7w0d          3 IAB 08 7w0d          2A Term 07 38w0d  6 lb 5 oz (2.863 kg) F Vag-Spont EPI Y BRUNO      Complications:  labor, Hyperemesis affecting pregnancy, antepartum   2B Term 07 38w0d  5 lb 3 oz (2.353 kg) M Vag-Spont EPI Y BRUNO      Complications:  labor, Hyperemesis affecting pregnancy, antepartum   1 IAB 04 7w0d              Past Surgical History:   Procedure Laterality Date    HX DILATION AND CURETTAGE      x9       Past Medical History:   Diagnosis Date    Anemia     Chlamydia 2006    GERD (gastroesophageal reflux disease) 10/16/2019    Gonorrhea 2006    High cholesterol     as a child    MDD (major depressive disorder), recurrent episode ([facility] [address] 10/16/2019    Polycystic disease, ovaries     Post concussion syndrome 10/16/2019    Post concussion syndrome 10/16/2019    Sickle cell trait syndrome ([facility] [address]     Vaginal discharge in pregnancy in second trimester 11/3/2021       Family History   Problem Relation Age of Onset    Diabetes Father     Breast Problems Other     Cancer Paternal Grandfather         Prostate CA    Crohn's Disease Sister     Seizures Sister     Thyroid Disease Paternal Grandmother        Social History     Socioeconomic History    Marital status: SINGLE     Spouse name: Not on file    Number of children: Not on file    Years of education: Not on file    Highest education level: Not on file   Occupational History    Not on file   Tobacco Use    Smoking status: Former Smoker    Smokeless tobacco: Never Used   Vaping Use    Vaping Use: Former   Substance and Sexual Activity    Alcohol use: No    Drug use: No    Sexual activity: Yes     Partners: Male     Birth control/protection: None   Other Topics Concern     Service Not Asked    Blood Transfusions Not Asked    Caffeine Concern Not Asked    Occupational Exposure Not Asked    Hobby Hazards Not Asked    Sleep Concern Not Asked    Stress Concern Not Asked    Weight Concern Not Asked    Special Diet Not Asked    Back Care Not Asked    Exercise Not Asked    Bike Helmet Not Asked   [address] Not Asked    Self-Exams Not Asked   Social History Narrative    Not on file     Social Determinants of Health     Financial Resource Strain:     Difficulty of Paying Living Expenses: Not on file   Food Insecurity:     Worried About Running Out of Food in the Last Year: Not on file    Beckie of Food in the Last Year: Not on file   Transportation Needs:     Lack of Transportation (Medical): Not on file    Lack of Transportation (Non-Medical):  Not on file   Physical Activity:     Days of Exercise per Week: Not on file    Minutes of Exercise per Session: Not on file   Stress:     Feeling of Stress : Not on file   Social Connections:     Frequency of Communication with Friends and Family: Not on file    Frequency of Social Gatherings with Friends and Family: Not on file    Attends Anglican Services: Not on file    Active Member of [address] [place] [address] [employer] or Organizations: Not on file    Attends Club or Organization Meetings: Not on file    Marital Status: Not on file   Intimate Partner Violence:     Fear of Current or Ex-Partner: Not on file    Emotionally Abused: Not on file    Physically Abused: Not on file    Sexually Abused: Not on file   Housing Stability:     Unable to Pay for Housing in the Last Year: Not on file    Number of [place] in the Last Year: Not on file    Unstable Housing in the Last Year: Not on file       Allergies   Allergen Reactions    Reglan [Metoclopramide] Other (comments)     Sarah           Current Facility-Administered Medications:    NIFEdipine (PROCARDIA) capsule 10 mg, 10 mg, Oral, ONCE, Dickert, Suellen Sandhoff, MD    promethazine (PHENERGAN) suppository 25 mg, 25 mg, Rectal, Q6H PRN, Dickert, Suellen Sandhoff, MD    promethazine (PHENERGAN) tablet 25 mg, 25 mg, Oral, Q6H PRN, Dickert, Suellen Sandhoff, MD, 25 mg at 11/17/21 1402    magnesium oxide (MAG-OX) tablet 400 mg, 400 mg, Oral, BID, Dickert, Suellen Sandhoff, MD    calcium carbonate (TUMS) chewable tablet 200 mg [elemental], 200 mg, Oral, BID WITH MEALS, Dickert, Suellen Sandhoff, MD    hydrOXYzine pamoate (VISTARIL) capsule 25 mg, 25 mg, Oral, Q6H PRN, Dickert, Suellen Sandhoff, MD, 25 mg at 11/17/21 1415    progesterone micronized (CRINONE) 8 % vaginal gel 90 mg, 90 mg, Vaginal, QHS, Dickert, Suellen Sandhoff, MD    sodium chloride (NS) flush 5-40 mL, 5-40 mL, IntraVENous, PRN, Dickert, Suellen Sandhoff, MD    indomethacin (INDOCIN) capsule 50 mg, 50 mg, Oral, Q6H, Dickert, Suellen Sandhoff, MD, 50 mg at 11/17/21 1214    sodium chloride (NS) flush 5-40 mL, 5-40 mL, IntraVENous, Q8H, Landrum Buerger, MD    HYDROcodone-acetaminophen [facility]) 5-325 mg per tablet 2 Tablet, 2 Tablet, Oral, Q6H PRN, Landrum Buerger, MD, 2 Tablet at 11/17/21 1416    Objective:     Vitals:     Patient Vitals for the past 24 hrs:   Temp Pulse Resp BP SpO2   11/17/21 0821 (P) 98.1 °F (36.7 °C) 80 (P) 16 101/66    11/17/21 0552 97.7 °F (36.5 °C) 97 16 (!) 97/51    11/16/21 2226  71 16 (!) 104/56    11/16/21 1932     100 %   11/16/21 1926 97.8 °F (36.6 °C) 79 16 (!) 105/56 100 %   11/16/21 1713     99 %        I&O:   No intake/output data recorded. No intake/output data recorded. Output by Drain (mL) 11/15/21 0701 - 11/15/21 1900 11/15/21 1901 - 11/16/21 0700 11/16/21 0701 - 11/16/21 1900 11/16/21 1901 - 11/17/21 0700 11/17/21 0701 - 11/17/21 1417   Patient has no LDAs of requested type attached. Exam:   Constitutional: Patient without distress. HEENT: Symmetric without facial palsy, uncorrected poor hearing or uncorrected poor vision.  No thyroid enlargement or goiter  Chest: No use of accessory muscles or excessive work of breathing  Abdomen: gravid, soft, non-tender; no rebound or guarding. Fundus: soft and mildly tender   Genitourinary: No sign of blood or amniotic fluid, external genitalia normal  Skin: normal coloration and turgor, no rashes, no suspicious skin lesions noted. Neurologic: AOx3. Gait normal. Reflexes and motor strength normal and symmetric. Cranial nerves 2-12 and sensation grossly intact.   Psychiatric: Mood appropriate, non focal; depressed mood compared to earlier this pregnancy    Maternal and Fetal Monitoring:                              Uterine Activity: Frequency (min): none Intensity: Mild  Fetal Heart Rate: Mode: DopplerFetal Heart Rate: 140      Labs:   CBC:    Recent Labs     11/16/21  1748 10/07/21  1357 09/30/21  1436 09/19/21  1830 09/13/21  1058 09/09/21  1123 09/09/21  0000 08/18/21 1939   WBC 11.4* 8.0 7.5 10.3 7.2 8.1  --  8.7   HGB 12.3 12.6 11.6* 13.2 13.1 13.0  --  13.5   HCT 37.1 37.2 33.7* 38.7 37.3 38.8  --  39.0    300 282 300 264 270  --  274   HGBEXT  --   --   --   --   --   --  13.0  --    HCTEXT  --   --   --   --   --   --  38.8  --    PLTEXT  --   --   --   --   --   --  270  --        CMP:   Recent Labs     10/07/21  1357 09/30/21  1436 09/29/21  1444 09/19/21  1831 09/19/21  1830 09/13/21  1058 08/18/21 1939    134* 137 138  --  137 138   K 4.1 3.8 4.0 3.5  --  4.0 3.7    107 102 105  --  106 107   CO2 23 22 20 26  --  24 27   AGAP 6* 5*  --  7  --  7 4*   GLU 87 122* 80 86  --  98 91   BUN 4* 5* 5* 6  --  6 7   CREA 0.59* 0.52* 0.62 0.58*  --  0.48* 0.62   GFRAA >60 >60 136 >60  --  >60 >60   GFRNA >60 >60 118 >60  --  >60 >60   CA 9.0 8.5 9.3 9.8  --  9.0 9.2   MG  --  1.9  --   --  1.8  --   --    ALB 3.0* 2.7* 3.9 3.2*  --  3.2*  --    TP 7.0 6.4 6.5 7.0  --  7.0  --    GLOB 4.0* 3.7*  --  3.8*  --  3.8*  --    AGRAT 0.8* 0.7* 1.5 0.8*  --  0.8*  --    ALT 17 14 9 15  --  28 --      Recent Glucose Results: Recent Glucose Results:   Recent Labs     10/07/21  1357 21  1436 21  1444 21  1831 21  1058 21  1939   GLU 87 122* 80 86 98 91     Prenatal Labs:    Lab Results   Component Value Date/Time    Rubella, External immune 2021 12:00 AM    HBsAg, External negative 2021 12:00 AM    HIV, External NR 2021 12:00 AM    RPR, External NR 2021 12:00 AM     Imaging:   Date: 2021 TV CL- 2.3cm, debris not noted today. No evidence of adenomyosis on ultrasound today. Focal contraction noted anteriorly. Assessment and Plan:  31 y.o.  at 19w6d with     1) Abdominopelvic Pain:   Does not appear to be clear cut cervical insufficiency or PTL. Diflucan x1 given on admission  UA reassuring, Cx pending. Pt reports regular BM. With numerous pregnancies, concern for myometrial hypercontractility as etiology for pain. Recommended last week to use magnesium oxide and tums BID. Will order that now. Begin progesterone PV today. Nifedipine 10mg po now. Basis of this decision is that the activation of caspase 3 was found to be stimulated by P4 treatment. Moreover, caspase 3 activation was accompanied by the cleavage of myocyte contractile proteins, smooth muscle ?- and ?-actins as well as downregulation of the gap junction protein Cx43. Thus, an important mechanism whereby P4/MI maintains myometrial quiescence during pregnancy is via its action to induce the active caspase cascade and cause degradation of proteins involved in myometrial contractility. The decline in MI function in the pregnant myometrium toward term results in decreased caspase activation and allows for the increase in contractile protein accumulation. Notably, caspase activation in uterine myocytes was also associated with induction of the endoplasmic reticulum stress response (ERSR), which is likely enhanced by physiological/mechanical stimuli.  The ERSR was reduced near term by upregulation of the adaptive unfolded protein response (UPR), resulting in a decline in active caspase 3 and for the induction of contractile proteins. If CL <2.0cm without contractions, would consider cervical cerclage. Concern to do perform surgery with contractions as this is not cervical insufficiency at this time. 2) Social Support Concerns- STD paperwork filled out this week. Elton Brock seeing. Concern that extended family lack of support is worsening maternal mental well-being. 3) N/V- phenergan PO/SC  Ketones in urine    Will continue inpt until tomorrow. If improved status, will plan dc home. Time:135  Minutes spent on floor,with greater than 50% of the time examining patient, explaining plan and coordinating care with nurse and requesting primary physician.

## 2021-11-18 VITALS
HEART RATE: 106 BPM | TEMPERATURE: 98.4 F | OXYGEN SATURATION: 100 % | SYSTOLIC BLOOD PRESSURE: 114 MMHG | DIASTOLIC BLOOD PRESSURE: 67 MMHG | RESPIRATION RATE: 16 BRPM

## 2021-11-18 LAB
ALBUMIN SERPL-MCNC: 2.4 G/DL (ref 3.5–5)
ALBUMIN/GLOB SERPL: 0.8 {RATIO} (ref 1.2–3.5)
ALP SERPL-CCNC: 71 U/L (ref 50–136)
ALT SERPL-CCNC: 39 U/L (ref 12–65)
ANION GAP SERPL CALC-SCNC: 7 MMOL/L (ref 7–16)
AST SERPL-CCNC: 23 U/L (ref 15–37)
BILIRUB SERPL-MCNC: 0.2 MG/DL (ref 0.2–1.1)
BUN SERPL-MCNC: 6 MG/DL (ref 6–23)
CALCIUM SERPL-MCNC: 8.8 MG/DL (ref 8.3–10.4)
CHLORIDE SERPL-SCNC: 107 MMOL/L (ref 98–107)
CO2 SERPL-SCNC: 23 MMOL/L (ref 21–32)
CREAT SERPL-MCNC: 0.45 MG/DL (ref 0.6–1)
GLOBULIN SER CALC-MCNC: 3 G/DL (ref 2.3–3.5)
GLUCOSE SERPL-MCNC: 104 MG/DL (ref 65–100)
POTASSIUM SERPL-SCNC: 3.8 MMOL/L (ref 3.5–5.1)
PROT SERPL-MCNC: 5.4 G/DL (ref 6.3–8.2)
SODIUM SERPL-SCNC: 137 MMOL/L (ref 136–145)

## 2021-11-18 PROCEDURE — 74011250637 HC RX REV CODE- 250/637: Performed by: OBSTETRICS & GYNECOLOGY

## 2021-11-18 PROCEDURE — G0378 HOSPITAL OBSERVATION PER HR: HCPCS

## 2021-11-18 PROCEDURE — 99226 PR SBSQ OBSERVATION CARE/DAY 35 MINUTES: CPT | Performed by: OBSTETRICS & GYNECOLOGY

## 2021-11-18 PROCEDURE — 80053 COMPREHEN METABOLIC PANEL: CPT

## 2021-11-18 PROCEDURE — 36415 COLL VENOUS BLD VENIPUNCTURE: CPT

## 2021-11-18 RX ORDER — PROGESTERONE 200 MG/1
200 CAPSULE ORAL
Qty: 30 CAPSULE | Refills: 5 | Status: SHIPPED | OUTPATIENT
Start: 2021-11-18 | End: 2022-01-11 | Stop reason: ALTCHOICE

## 2021-11-18 RX ORDER — NIFEDIPINE 30 MG/1
30 TABLET, EXTENDED RELEASE ORAL 2 TIMES DAILY
Qty: 60 TABLET | Refills: 5 | Status: SHIPPED | OUTPATIENT
Start: 2021-11-18 | End: 2022-03-09

## 2021-11-18 RX ORDER — ACETAMINOPHEN 500 MG
1000 TABLET ORAL
Status: DISCONTINUED | OUTPATIENT
Start: 2021-11-18 | End: 2021-11-18 | Stop reason: HOSPADM

## 2021-11-18 RX ORDER — LANOLIN ALCOHOL/MO/W.PET/CERES
400 CREAM (GRAM) TOPICAL 2 TIMES DAILY
Qty: 60 TABLET | Refills: 5 | Status: SHIPPED | OUTPATIENT
Start: 2021-11-18 | End: 2022-01-11 | Stop reason: ALTCHOICE

## 2021-11-18 RX ORDER — CALCIUM CARBONATE 200(500)MG
2 TABLET,CHEWABLE ORAL 2 TIMES DAILY
Qty: 60 TABLET | Refills: 5 | Status: SHIPPED | OUTPATIENT
Start: 2021-11-18 | End: 2022-01-11 | Stop reason: ALTCHOICE

## 2021-11-18 RX ORDER — HYDROXYZINE PAMOATE 25 MG/1
25 CAPSULE ORAL
Qty: 60 CAPSULE | Refills: 5 | Status: SHIPPED | OUTPATIENT
Start: 2021-11-18 | End: 2021-12-30 | Stop reason: SDUPTHER

## 2021-11-18 RX ADMIN — INDOMETHACIN 50 MG: 50 CAPSULE ORAL at 06:07

## 2021-11-18 RX ADMIN — ACETAMINOPHEN 1000 MG: 500 TABLET, FILM COATED ORAL at 15:59

## 2021-11-18 RX ADMIN — HYDROXYZINE PAMOATE 25 MG: 25 CAPSULE ORAL at 11:21

## 2021-11-18 RX ADMIN — INDOMETHACIN 50 MG: 50 CAPSULE ORAL at 11:21

## 2021-11-18 RX ADMIN — CALCIUM CARBONATE 200 MG: 500 TABLET, CHEWABLE ORAL at 08:41

## 2021-11-18 RX ADMIN — Medication 400 MG: at 08:40

## 2021-11-18 RX ADMIN — NIFEDIPINE 30 MG: 30 TABLET, FILM COATED, EXTENDED RELEASE ORAL at 08:40

## 2021-11-18 NOTE — PROGRESS NOTES
MFM Consultation      Gus Enamorado (: 1985) is a 31 y.o. [facility] Home [phone] at 20w0d with 2022, by Last Menstrual Period. Presents for evaluation of the following chief complaint(s):  Ultrasound and High Risk OB (Habitual Aborter, Depression, Hyperemesis, PTL)     Patient is out of work for now from [employer] due to hyperemesis gravidum.  Scheduled to see Primary OB ([facility]) on 21.      Has HAs up to 3x/week that are migraine-like that she can't seem to get rid of.     Denies edema.  Denies preeclamptic symptoms.  No bleeding, LOF. Notes fetal movement.     Hyperemesis since 6 weeks. C/o N/V daily - on Zofran pump and phenergan suppositories.  States things are stable now off zofran pump. Took Zofran pump off 10/26 d/t severe constipation for almost 3 week. States it has resolved. N/V stable with phenergan.      Pt having abdominal tightening and lower back pain. Discomfort worst when sitting upright (driving). Improves with counterpressure by a pillow. CL slightly shorter than prior again this week. Reviewed symptomatic care options. Pt states that only thing that has helped was the 1mg of stadol. Reviewed why this is not a maintenance med for use at home. Tolerating procardia without issue. Began progesterone last night. No issues. May have vistaril to ease uterine irritability. Review of Systems - per HPI; otherwise unremarkable. Exam:     Visit Vitals  Visit Vitals  BP (!) 122/58 (BP 1 Location: Left upper arm, BP Patient Position: Sitting)   Pulse 98   Temp 98.4 °F (36.9 °C)   Resp 16   SpO2 100%       Ultrasound: Please see formal ultrasound report under imaging tab. ASSESSMENT/PLAN:  1) Continues to have abdominopelvic pain. Tolerating procardia. Has completed indocin course. No UTI    Likely uterine irritability due to number of pregnancy events in past.   Pt does have an increased risk of PTL with this  (18.7% vs 11% for those without irritability).      Recommend increasing hydration and rest. Limiting physical activity. (Instructions in discharge instructions)    Vistaril prn  Nifedipine 30mg XL BID    Progesterone supplementation started yesterday. 2) CL with MFM     3) Continue PT. On this date 2021 I have spent 30 minutes reviewing previous notes, test results and face to face with the patient discussing the diagnosis and importance of compliance with the treatment plan as well as documenting on the day of the visit. An electronic signature was used to authenticate this note. Deshaun Bettencourt MD        ICD-10-CM ICD-9-CM   1. Hyperemesis affecting pregnancy, antepartum  O21.0 643.03   2. Hx of uterine surgery affecting current pregnancy  O34.29 654.93   3. Aborter, habitual, antepartum  O26.20 646.33   4. Current pregnancy with history of pre-term labor in second trimester  O09.212 V23.41   5.  labor in second trimester without delivery  O60.02 644.03   6. Abdominal pain during pregnancy in second trimester  O26.892 646.83    R10.9 789.00   7. 19 weeks gestation of pregnancy  Z3A.19 V22.2   8. High-risk pregnancy in second trimester  O09.92 V23.9   9. Depression affecting pregnancy  O99.340 648.40    F32. A 311   10. Gastroesophageal reflux in pregnancy  O99.619 646.80    K21.9 530.81   11.  Back pain affecting pregnancy in second trimester  O99.891 646.83    M54.9 724.5

## 2021-11-18 NOTE — PROGRESS NOTES
SW check-in with patient. Patient reports that she's \"still cramping. \"  Her discharge plans are undetermined at this time. Patient states that it's been nice to have a reprieve from \"taking care of everybody else\" while she's been in the hospital.  Discussed minimal support that she is receiving from her family; however, her boyfriend/FOB is supportive. Patient has not yet called any therapy practices that was previously provided by . SW encouraged patient to practice self-care during this time. Patient denied any needs from  at this time. Patient has this 's contact information.     CLARISSA Charles, [facility]   [phone]

## 2021-11-18 NOTE — DISCHARGE SUMMARY
Obstetrical Discharge Summary     Name: Moise Seth MRN: [id number]  SSN: [id number]    YOB: 1985  Age: 31 y.o. Sex: female      Allergies: Reglan [metoclopramide]    Admit Date: 11/16/2021    Discharge Date: 11/18/2021     Admitting Physician: Ata Cabrera MD     Attending Physician:  No att. providers found     * Admission Diagnoses: Short cervix affecting pregnancy [O26.879]    * Discharge Diagnoses: This patient has no babies on file. Additional Diagnoses:   Hospital Problems as of 11/18/2021 Date Reviewed: 11/16/2021          Codes Class Noted - Resolved POA    Abdominal pain during pregnancy in second trimester ICD-10-CM: O26.892, R10.9  ICD-9-CM: 646.83, 789.00  11/4/2021 - Present Yes    Overview Addendum 11/11/2021 11:54 AM by Lori Thomas MD     11/4/2021 [facility]: uterine irritability, pain in lower abdomen relieved with counter pressure. ?constipation component. 11/11/2021 at [facility]: continues to c/o abdominal pain not relieved by indocin. Simethicone Rx  PT             * (Principal) Short cervix affecting pregnancy ICD-10-CM: O26.879  ICD-9-CM: 649.70  11/16/2021 - Present Unknown        Pregnancy headache in second trimester ICD-10-CM: O26.892, R51.9  ICD-9-CM: 646.83, 784.0  10/26/2021 - Present Yes    Overview Addendum 11/16/2021  3:55 PM by Valeria Hall RN     10/26/2021 at [facility]: Pt with migraine-like headaches that are not relieved with rest or Tylenol. · Fioricet sent in.    11/16/2021 at [facility]: No headache today, states had one this past weekend relieved by Fioricet. Current pregnancy with history of pre-term labor in second trimester ICD-10-CM: O09.212  ICD-9-CM: V23.41  9/30/2021 - Present Yes    Overview Addendum 11/11/2021 11:58 AM by Lori Thomas MD     2007-T[facility] Preg had PTL and was on magnesium per pt  9/30/2021 at [facility]: c/o frequent \"uterine cramping\" and back pain. Suspect it is due to dehydration and vomiting.   Hopeful of improvement after IV hydration  10/26/2021 at [facility]: c/o LLQ pain and lower back pain. Hasn't had a bowel movement in over a week. Stress the importance of regular bowel movements and staying hydrated. 11/11/2021 [facility]: midpelvic pain. BM daily now. Indocin did not help pain. Will try Mag Ox 300mg + 2 tums BID. Has some orthostatic HTN currently, would not tolerate procardia. Hyperemesis affecting pregnancy, antepartum ICD-10-CM: O21.0  ICD-9-CM: 643.03  8/18/2021 - Present Yes    Overview Addendum 11/16/2021  5:10 PM by Ana Maria Powers RN     Zofran pump orders faxed to [facility] 8/19/21- unable to reach pt  9/9/21 pt provided with [employer] contact information to get Zofran pump initiated  9/30/2021 at [facility]: Has had Zofran pump for past 2 weeks; managed by [employer]- currently 6mg over 6hr; C/o hyperemesis since 6 weeks. C/o nausea all day with vomiting 3-4 times; sometimes bile colored. Uses phenergan suppositories at night. Pt scared to eat because she doesn't want to throw up. C/o frequent constipation. Feels dizzy, fatigue and HA most days. Depressed because she feels bad and can't be there for her teenage twins and because she can't work due her constantly feeling bad. Urine sample obtained; concentrated mary urine; strong odor. +4 ketones  10/26/2021 at [facility]: Took pump off today d/t severe constipation, reviewed OTC medications to take to address this issue, provided list in writing for pt.  11/4/2021 at [facility]: Not currently on pump  11/11/2021 at [facility]: C/o nausea, denies vomiting, Reflux symptoms noted. Not taking any meds other than [place] phenergan at this time.    11/16/2021 at [facility]: Not currently on pump, only Phenergan suppositories PRN for nausea                  Lab Results   Component Value Date/Time    Rubella, External immune 09/09/2021 12:00 AM    ABO,Rh O negative 09/09/2021 12:00 AM      Immunization History   Administered Date(s) Administered    Rho(D) Immune Globulin - IM 01/03/2017, 2021       * Procedures: none  * No surgery found *           * Discharge Condition: good    [facility] Course: deemed stable per mfm     * Disposition: Home    Discharge Medications:   Discharge Medication List as of 2021  4:45 PM      START taking these medications    Details   calcium carbonate (TUMS) 200 mg calcium (500 mg) chew Take 2 Tablets by mouth two (2) times a day., Normal, Disp-60 Tablet, R-5      hydrOXYzine pamoate (VISTARIL) 25 mg capsule Take 1 Capsule by mouth two (2) times daily as needed for Anxiety (uterine irritability). , Normal, Disp-60 Capsule, R-5      magnesium oxide (MAG-OX) 400 mg tablet Take 1 Tablet by mouth two (2) times a day., Normal, Disp-60 Tablet, R-5      NIFEdipine ER (PROCARDIA XL) 30 mg ER tablet Take 1 Tablet by mouth two (2) times a day., Normal, Disp-60 Tablet, R-5      progesterone (Prometrium) 200 mg capsule Insert 1 Capsule into vagina nightly. Nightly through 36 weeks gestation  Indications:  labor prevention, Normal, Disp-30 Capsule, R-5         CONTINUE these medications which have NOT CHANGED    Details   promethazine (PHENERGAN) 25 mg suppository Insert 1 Suppository into rectum every six (6) hours as needed for Nausea., Normal, Disp-20 Suppository, R-5      PNV Comb #2-Iron-FA-Omega 3 29-1-400 mg cmpk Take  by mouth., Historical Med         STOP taking these medications       OTHER Comments:   Reason for Stopping:               * Follow-up Care/Patient Instructions:   Activity: Activity as tolerated      Follow-up Information     Follow up With Specialties Details Why [address] [place] [address] 90, [address] [place] [address]  [phone]

## 2021-11-18 NOTE — DISCHARGE INSTRUCTIONS
To Improve Uterine Irritability and Pain  · Stay hydrated  · Empty bladder regularly (every 1-2 hours)  · Eat small, frequent, easy to digest meals. · Rest when possible. · Get enough sleep. · Skip caffeinated food and drinks. · Avoid lifting heavy objects (greater than 15#)  · Reduce stressors- avoid those individuals that increase stress  · May drive, but adjust position as needed. · Continue magnesium and calcium supplements. · Pelvic rest if intercourse/orgasm worsen pain. · Continue procardia as scheduled. · Continue vistaril as needed. Scheduled Medications:  Morning/Evening- nifedipine (procardia) 30mg XL every 12 hours. Evening- progesterone 200mg capsule in vagina  Morning/Evening- recommend magnesium oxide 400mg and 2 tums every 12 hours    As needed:   Vistaril 25mg- up to every 12 hours. Miralax/Stool Softener        Patient Education        Pregnancy Precautions: Care Instructions  Your Care Instructions     There is no sure way to prevent labor before your due date ( labor) or to prevent most other pregnancy problems. But there are things you can do to increase your chances of a healthy pregnancy. Go to your appointments, follow your doctor's advice, and take good care of yourself. Eat well, and exercise (if your doctor agrees). And make sure to drink plenty of water. Follow-up care is a key part of your treatment and safety. Be sure to make and go to all appointments, and call your doctor if you are having problems. It's also a good idea to know your test results and keep a list of the medicines you take. How can you care for yourself at home? · Make sure you go to your prenatal appointments. At each visit, your doctor will check your blood pressure. Your doctor will also check to see if you have protein in your urine. High blood pressure and protein in urine are signs of preeclampsia. This condition can be dangerous for you and your baby. · Drink plenty of fluids. Dehydration can cause contractions. If you have kidney, heart, or liver disease and have to limit fluids, talk with your doctor before you increase the amount of fluids you drink. · Tell your doctor right away if you notice any symptoms of an infection, such as:  ? Burning when you urinate. ? A foul-smelling discharge from your vagina. ? Vaginal itching. ? Unexplained fever. ? Unusual pain or soreness in your uterus or lower belly. · Eat a balanced diet. Include plenty of foods that are high in calcium and iron. ? Foods high in calcium include milk, cheese, yogurt, almonds, and broccoli. ? Foods high in iron include red meat, shellfish, poultry, eggs, beans, raisins, whole-grain bread, and leafy green vegetables. · Do not smoke. If you need help quitting, talk to your doctor about stop-smoking programs and medicines. These can increase your chances of quitting for good. · Do not drink alcohol or use marijuana or illegal drugs. · Follow your doctor's directions about activity. Your doctor will let you know how much, if any, exercise you can do. · Ask your doctor if you can have sex. If you are at risk for early labor, your doctor may ask you to not have sex. · Take care to prevent falls. During pregnancy, your joints are loose, and your balance is off. Sports such as bicycling, skiing, or in-line skating can increase your risk of falling. And don't ride horses or motorcycles, dive, water ski, scuba dive, or parachute jump while you are pregnant. · Avoid getting very hot. Do not use saunas or hot tubs. Avoid staying out in the sun in hot weather for long periods. Take acetaminophen (Tylenol) to lower a high fever. · Do not take any over-the-counter or herbal medicines or supplements without talking to your doctor or pharmacist first.  When should you call for help? Call 911  anytime you think you may need emergency care.  For example, call if:    · You passed out (lost consciousness).     · You have a seizure.     · You have severe vaginal bleeding.     · You have severe pain in your belly or pelvis.     · You have had fluid gushing or leaking from your vagina and you know or think the umbilical cord is bulging into your vagina. If this happens, immediately get down on your knees so your rear end (buttocks) is higher than your head. This will decrease the pressure on the cord until help arrives. Call your doctor now or seek immediate medical care if:    · You have signs of preeclampsia, such as:  ? Sudden swelling of your face, hands, or feet. ? New vision problems (such as dimness, blurring, or seeing spots). ? A severe headache.     · You have any vaginal bleeding.     · You have belly pain or cramping.     · You have a fever.     · You have had regular contractions (with or without pain) for an hour. This means that you have 8 or more within 1 hour or 4 or more in 20 minutes after you change your position and drink fluids.     · You have a sudden release of fluid from your vagina.     · You have low back pain or pelvic pressure that does not go away.     · You notice that your baby has stopped moving or is moving much less than normal.   Watch closely for changes in your health, and be sure to contact your doctor if you have any problems. Where can you learn more? Go to http://www.[employer].com/  Enter Y951 in the search box to learn more about \"Pregnancy Precautions: Care Instructions. \"  Current as of: June 16, 2021               Content Version: 13.0  © 0811-1171 [employer]. Care instructions adapted under license by [employer]) (which disclaims liability or warranty for this information). If you have questions about a medical condition or this instruction, always ask your healthcare professional. [place] [address] any warranty or liability for your use of this information.

## 2021-11-18 NOTE — PROGRESS NOTES
PT states ready for discharge  Seen by MFM earlier and deemed stable for dishcarge  Discussed f/u with pt- to see mfm next week appt already arranged  All ?s answered

## 2021-11-19 LAB
BACTERIA SPEC CULT: NORMAL
SERVICE CMNT-IMP: NORMAL

## 2021-11-22 ENCOUNTER — HOSPITAL ENCOUNTER (OUTPATIENT)
Dept: PHYSICAL THERAPY | Age: 36
Discharge: HOME | End: 2021-11-22
Attending: OBSTETRICS & GYNECOLOGY
Payer: COMMERCIAL

## 2021-11-22 PROBLEM — O21.0 HYPEREMESIS AFFECTING PREGNANCY, ANTEPARTUM: Status: RESOLVED | Noted: 2021-08-18 | Resolved: 2021-11-22

## 2021-11-22 PROBLEM — O34.32 CERVICAL INSUFFICIENCY DURING PREGNANCY IN SECOND TRIMESTER, ANTEPARTUM: Status: ACTIVE | Noted: 2021-11-16

## 2021-11-22 PROBLEM — Z3A.20 20 WEEKS GESTATION OF PREGNANCY: Status: ACTIVE | Noted: 2021-11-22

## 2021-11-22 PROCEDURE — 97110 THERAPEUTIC EXERCISES: CPT

## 2021-11-22 PROCEDURE — 97140 MANUAL THERAPY 1/> REGIONS: CPT

## 2021-11-22 NOTE — PROGRESS NOTES
Juanpablo Sagastume  : 1985  Primary: [facility]  Secondary:  [facility]  [address], [address], [address]  [phone]   [phone]        OUTPATIENT PHYSICAL THERAPY: Daily Treatment Note 2021  Visit Count:  Visit count could not be calculated. Make sure you are using a visit which is associated with an episode. ICD-10: Treatment Diagnosis:    Low back pain (M54.5)   Precautions/Allergies:   Reglan [metoclopramide]   TREATMENT PLAN:  Effective Dates: 2021 TO 2/3/2022 (90 days). Frequency/Duration: 2 times a week for 90 Day(s)    Pre-treatment Symptoms/Complaints:  2021: \"I was admitted to the hospital for contractions. I go see the doctor today. I am stiff today\". Pain: Initial: Pain Intensity 1: 1  Pain Location 1: Back  Pain Orientation 1: Lower  Post Session:  0/10   Medications Last Reviewed:  2021  Updated Objective Findings:  None Today  TREATMENT:     THERAPEUTIC EXERCISE: (25 minutes):  Exercises per grid below to improve mobility and strength. Required minimal visual and verbal cues to promote proper body alignment, promote proper body posture and promote proper body mechanics. Progressed resistance, range, repetitions and complexity of movement as indicated. Date:  2021   Activity/Exercise Parameters   Single knee to chest stretch X   Supine hamstring stretch 3x30 sec hold bilateral with assist   SI belt X   Nustep Level 1 x 6 minutes   Deep breathing on blue ball 20 reps   Pelvic tilts on blue ball 2x10 reps   Marching on blue ball 2x10 reps   Supine piriformis stretch 3x30 sec hold bilateral with assist         MANUAL THERAPY: (20 minutes): Soft tissue mobilization was utilized and necessary because of the patient's painful spasm. Laying on right side, patient received trigger point release along left piriformis/left SI region. Skin intact after treatment. Treatment/Session Summary:    · Response to Treatment:  Patient tolerated treatment without complaints. · Communication/Consultation:  None today  · Equipment provided today:  None today  · Recommendations/Intent for next treatment session: Next visit will focus on improving overall mobility and pain with daily activities.     Total Treatment Billable Duration:  45 minutes   PT Patient Time In/Time Out  Time In: 0845  Time Out: [address], PT    Visit # Therapist initials Date Arrived NS/ Cx < 24 hr >24 hr Cx Visit Comments   1 JS 11/5/2021 X    Initial Assessment and Treatment  No visit limit   2 PV 11/12/2021 X      3 PV 11/15/2021 X       MM 11/17/2021  X  In hospital   4 PV 11/22/2021 X                                                                                                                             Abbreviations: NS = No Show; CX = cancelled     Future Appointments   Date Time Provider Manuel Sloane   11/22/2021  2:30 PM [facility] ULTRASOUND 2 [facility]   11/22/2021  3:15 PM Reji Chaney MD [facility]   11/29/2021  8:45 AM Benjamin Bigger, PT [facility]   11/30/2021  2:30 PM Cesilia Mendoza MD [facility]   12/2/2021  8:45 AM VissageShannen Childes, PT [facility]   12/6/2021  8:45 AM VissageShannen Childes, PT [facility]   12/9/2021  8:45 AM Benjamin Bigger, PT [facility]   12/14/2021  8:45 AM VissagePelonShannen Childes, PT [facility]   12/16/2021  8:45 AM Vissage Shannen Childes, PT [facility]   12/21/2021  8:45 AM Benjamin Bigger, PT [facility]   12/23/2021  8:45 AM Benjamin Bigger, PT [facility]   12/27/2021  8:45 AM Benjamin Bigger, PT [facility]   12/30/2021  8:45 AM Vissage, Shannen Childes, PT [facility]

## 2021-11-29 ENCOUNTER — HOSPITAL ENCOUNTER (OUTPATIENT)
Dept: PHYSICAL THERAPY | Age: 36
Discharge: HOME | End: 2021-11-29
Attending: OBSTETRICS & GYNECOLOGY
Payer: COMMERCIAL

## 2021-11-29 NOTE — PROGRESS NOTES
Mercedez Becerra  : 1985  Primary: [facility] [place] [facility]*  Secondary:  [facility] [place] [address] [facility].  [phone]   [phone]          OUTPATIENT DAILY NOTE    NAME/AGE/GENDER: Mercedez Becerra is a 31 y.o. female. DATE: 2021    Patient cancelled (less than 24 hours ago) her appointment for today due to illness. Will plan to follow up on next scheduled visit.     Chitra Niño, CHAY    Future Appointments   Date Time Provider Manuel Anton   2021  2:30 PM Gina West MD [facility]   2021 11:00 AM [facility] ULTRASOUND 2 [facility]   2021 11:30 AM Tena Haider MD [facility]   2021  8:45 AM Vissage, Olivia Mariaa, PT [facility]   2021  8:45 AM Vissage, Olivia Mariaa, PT [facility]   2021  8:45 AM Jo Yamile, PT [facility]   2021  8:45 AM Vissage, Olivia Mariaa, PT [facility]   2021  8:45 AM Vissage, Olivia Mariaa, PT [facility]   2021  8:45 AM Jo Yamile, PT [facility]   2021  8:45 AM Jo Yamile, PT [facility]   2021  8:45 AM Jo Yamile, PT [facility]   2021  8:45 AM Vissage, Olivia Mariaa, PT [facility]

## 2021-11-30 PROBLEM — Z3A.20 20 WEEKS GESTATION OF PREGNANCY: Status: RESOLVED | Noted: 2021-11-22 | Resolved: 2021-11-30

## 2021-12-01 ENCOUNTER — HOSPITAL ENCOUNTER (OUTPATIENT)
Age: 36
Setting detail: OBSERVATION
LOS: 1 days | Discharge: HOME | End: 2021-12-04
Attending: OBSTETRICS & GYNECOLOGY | Admitting: OBSTETRICS & GYNECOLOGY
Payer: COMMERCIAL

## 2021-12-01 DIAGNOSIS — O60.02 PRETERM LABOR IN SECOND TRIMESTER WITHOUT DELIVERY: ICD-10-CM

## 2021-12-01 DIAGNOSIS — R10.9 ABDOMINAL PAIN DURING PREGNANCY IN SECOND TRIMESTER: ICD-10-CM

## 2021-12-01 DIAGNOSIS — O34.32 CERVICAL CERCLAGE SUTURE PRESENT IN SECOND TRIMESTER: ICD-10-CM

## 2021-12-01 DIAGNOSIS — O34.32 CERVICAL INSUFFICIENCY DURING PREGNANCY IN SECOND TRIMESTER, ANTEPARTUM: ICD-10-CM

## 2021-12-01 DIAGNOSIS — O26.892 ABDOMINAL PAIN DURING PREGNANCY IN SECOND TRIMESTER: ICD-10-CM

## 2021-12-01 DIAGNOSIS — O09.92 HIGH-RISK PREGNANCY IN SECOND TRIMESTER: ICD-10-CM

## 2021-12-01 LAB
AMPHET UR QL SCN: NEGATIVE
APPEARANCE UR: ABNORMAL
BACTERIA URNS QL MICRO: 0 /HPF
BARBITURATES UR QL SCN: POSITIVE
BENZODIAZ UR QL: NEGATIVE
BILIRUB UR QL: NEGATIVE
CANNABINOIDS UR QL SCN: NEGATIVE
CASTS URNS QL MICRO: ABNORMAL /LPF
COCAINE UR QL SCN: NEGATIVE
COLOR UR: YELLOW
EPI CELLS #/AREA URNS HPF: ABNORMAL /HPF
ERYTHROCYTE [DISTWIDTH] IN BLOOD BY AUTOMATED COUNT: 12.8 % (ref 11.9–14.6)
GLUCOSE UR STRIP.AUTO-MCNC: NEGATIVE MG/DL
HCT VFR BLD AUTO: 32.3 % (ref 35.8–46.3)
HGB BLD-MCNC: 11.3 G/DL (ref 11.7–15.4)
HGB UR QL STRIP: ABNORMAL
KETONES UR QL STRIP.AUTO: NEGATIVE MG/DL
LEUKOCYTE ESTERASE UR QL STRIP.AUTO: ABNORMAL
MCH RBC QN AUTO: 31.8 PG (ref 26.1–32.9)
MCHC RBC AUTO-ENTMCNC: 35 G/DL (ref 31.4–35)
MCV RBC AUTO: 91 FL (ref 79.6–97.8)
METHADONE UR QL: NEGATIVE
NITRITE UR QL STRIP.AUTO: NEGATIVE
NRBC # BLD: 0 K/UL (ref 0–0.2)
OPIATES UR QL: NEGATIVE
PCP UR QL: NEGATIVE
PH UR STRIP: 7.5 [PH] (ref 5–9)
PLATELET # BLD AUTO: 258 K/UL (ref 150–450)
PMV BLD AUTO: 9.6 FL (ref 9.4–12.3)
PROT UR STRIP-MCNC: NEGATIVE MG/DL
RBC # BLD AUTO: 3.55 M/UL (ref 4.05–5.2)
RBC #/AREA URNS HPF: ABNORMAL /HPF
SP GR UR REFRACTOMETRY: 1.01 (ref 1–1.02)
UROBILINOGEN UR QL STRIP.AUTO: 1 EU/DL (ref 0.2–1)
WBC # BLD AUTO: 8 K/UL (ref 4.3–11.1)
WBC URNS QL MICRO: ABNORMAL /HPF

## 2021-12-01 PROCEDURE — 96375 TX/PRO/DX INJ NEW DRUG ADDON: CPT

## 2021-12-01 PROCEDURE — 80307 DRUG TEST PRSMV CHEM ANLYZR: CPT

## 2021-12-01 PROCEDURE — 74011250637 HC RX REV CODE- 250/637: Performed by: OBSTETRICS & GYNECOLOGY

## 2021-12-01 PROCEDURE — G0378 HOSPITAL OBSERVATION PER HR: HCPCS

## 2021-12-01 PROCEDURE — 74011250636 HC RX REV CODE- 250/636: Performed by: OBSTETRICS & GYNECOLOGY

## 2021-12-01 PROCEDURE — 96376 TX/PRO/DX INJ SAME DRUG ADON: CPT

## 2021-12-01 PROCEDURE — 96374 THER/PROPH/DIAG INJ IV PUSH: CPT

## 2021-12-01 PROCEDURE — 85027 COMPLETE CBC AUTOMATED: CPT

## 2021-12-01 PROCEDURE — 74011000258 HC RX REV CODE- 258: Performed by: OBSTETRICS & GYNECOLOGY

## 2021-12-01 PROCEDURE — 74011000250 HC RX REV CODE- 250: Performed by: OBSTETRICS & GYNECOLOGY

## 2021-12-01 PROCEDURE — 81001 URINALYSIS AUTO W/SCOPE: CPT

## 2021-12-01 PROCEDURE — 99220 PR INITIAL OBSERVATION CARE/DAY 70 MINUTES: CPT | Performed by: OBSTETRICS & GYNECOLOGY

## 2021-12-01 RX ORDER — DEXTROSE, SODIUM CHLORIDE, SODIUM LACTATE, POTASSIUM CHLORIDE, AND CALCIUM CHLORIDE 5; .6; .31; .03; .02 G/100ML; G/100ML; G/100ML; G/100ML; G/100ML
100 INJECTION, SOLUTION INTRAVENOUS CONTINUOUS
Status: DISCONTINUED | OUTPATIENT
Start: 2021-12-01 | End: 2021-12-02

## 2021-12-01 RX ORDER — BUTORPHANOL TARTRATE 2 MG/ML
1 INJECTION INTRAMUSCULAR; INTRAVENOUS
Status: DISCONTINUED | OUTPATIENT
Start: 2021-12-01 | End: 2021-12-01

## 2021-12-01 RX ORDER — FAMOTIDINE 20 MG/1
20 TABLET, FILM COATED ORAL EVERY 12 HOURS
Status: DISCONTINUED | OUTPATIENT
Start: 2021-12-01 | End: 2021-12-04 | Stop reason: HOSPADM

## 2021-12-01 RX ORDER — BUTORPHANOL TARTRATE 2 MG/ML
1 INJECTION INTRAMUSCULAR; INTRAVENOUS ONCE
Status: COMPLETED | OUTPATIENT
Start: 2021-12-01 | End: 2021-12-01

## 2021-12-01 RX ORDER — HYDROXYZINE PAMOATE 25 MG/1
25 CAPSULE ORAL
Status: DISCONTINUED | OUTPATIENT
Start: 2021-12-01 | End: 2021-12-04 | Stop reason: HOSPADM

## 2021-12-01 RX ORDER — SODIUM CHLORIDE 0.9 % (FLUSH) 0.9 %
5-40 SYRINGE (ML) INJECTION AS NEEDED
Status: DISCONTINUED | OUTPATIENT
Start: 2021-12-01 | End: 2021-12-04 | Stop reason: HOSPADM

## 2021-12-01 RX ORDER — SODIUM CHLORIDE 0.9 % (FLUSH) 0.9 %
5-40 SYRINGE (ML) INJECTION EVERY 8 HOURS
Status: DISCONTINUED | OUTPATIENT
Start: 2021-12-01 | End: 2021-12-04 | Stop reason: HOSPADM

## 2021-12-01 RX ORDER — INDOMETHACIN 50 MG/1
50 CAPSULE ORAL EVERY 6 HOURS
Status: DISCONTINUED | OUTPATIENT
Start: 2021-12-01 | End: 2021-12-03

## 2021-12-01 RX ORDER — METRONIDAZOLE 500 MG/100ML
500 INJECTION, SOLUTION INTRAVENOUS EVERY 12 HOURS
Status: DISCONTINUED | OUTPATIENT
Start: 2021-12-01 | End: 2021-12-02

## 2021-12-01 RX ORDER — LANOLIN ALCOHOL/MO/W.PET/CERES
3 CREAM (GRAM) TOPICAL
Status: DISCONTINUED | OUTPATIENT
Start: 2021-12-01 | End: 2021-12-04 | Stop reason: HOSPADM

## 2021-12-01 RX ORDER — NIFEDIPINE 30 MG/1
30 TABLET, EXTENDED RELEASE ORAL 2 TIMES DAILY
Status: DISCONTINUED | OUTPATIENT
Start: 2021-12-01 | End: 2021-12-03

## 2021-12-01 RX ADMIN — SODIUM CHLORIDE, SODIUM LACTATE, POTASSIUM CHLORIDE, AND CALCIUM CHLORIDE 1000 ML: 600; 310; 30; 20 INJECTION, SOLUTION INTRAVENOUS at 12:45

## 2021-12-01 RX ADMIN — METRONIDAZOLE 500 MG: 500 INJECTION, SOLUTION INTRAVENOUS at 13:24

## 2021-12-01 RX ADMIN — FAMOTIDINE 20 MG: 20 TABLET, FILM COATED ORAL at 18:59

## 2021-12-01 RX ADMIN — INDOMETHACIN 50 MG: 50 CAPSULE ORAL at 13:01

## 2021-12-01 RX ADMIN — BUTORPHANOL TARTRATE 1 MG: 2 INJECTION, SOLUTION INTRAMUSCULAR; INTRAVENOUS at 20:56

## 2021-12-01 RX ADMIN — BUTORPHANOL TARTRATE 1 MG: 2 INJECTION, SOLUTION INTRAMUSCULAR; INTRAVENOUS at 13:19

## 2021-12-01 RX ADMIN — HYDROXYZINE PAMOATE 25 MG: 25 CAPSULE ORAL at 18:59

## 2021-12-01 RX ADMIN — PROMETHAZINE HYDROCHLORIDE 25 MG: 25 INJECTION INTRAMUSCULAR; INTRAVENOUS at 20:55

## 2021-12-01 RX ADMIN — INDOMETHACIN 50 MG: 50 CAPSULE ORAL at 18:32

## 2021-12-01 RX ADMIN — AMPICILLIN SODIUM 1 G: 1 INJECTION, POWDER, FOR SOLUTION INTRAMUSCULAR; INTRAVENOUS at 12:59

## 2021-12-01 RX ADMIN — AZITHROMYCIN MONOHYDRATE 500 MG: 500 INJECTION, POWDER, LYOPHILIZED, FOR SOLUTION INTRAVENOUS at 13:47

## 2021-12-01 RX ADMIN — NIFEDIPINE 30 MG: 30 TABLET, FILM COATED, EXTENDED RELEASE ORAL at 18:37

## 2021-12-01 RX ADMIN — AMPICILLIN SODIUM 1 G: 1 INJECTION, POWDER, FOR SOLUTION INTRAMUSCULAR; INTRAVENOUS at 18:29

## 2021-12-01 RX ADMIN — SODIUM CHLORIDE 25 MG: 9 INJECTION INTRAMUSCULAR; INTRAVENOUS; SUBCUTANEOUS at 13:19

## 2021-12-01 RX ADMIN — SODIUM CHLORIDE 20 ML: 9 INJECTION INTRAMUSCULAR; INTRAVENOUS; SUBCUTANEOUS at 13:19

## 2021-12-01 NOTE — H&P
MFM    Pt sent from office for cervical shortening and abdominal pain. Unclear as to whether this is pretem labor or cervical insufficiency due to multiple cervical procedures. Some pelvic discomfort for several days, cramping beginning yesterday- feels like \"period cramping\". Not having nausea or emesis recently- discomfort at night due to fetal movement triggering cramping. Taking her phenergan IA to sleep through the discomfort. Vistaril is helping her anxiety- although mainly by blunting her affect. Pt has d/c'd her vaginal prometrium and Calcium/Magnesium. Pt is overdoing it at home. She is upset over being hospitalized. Feels like she is being an inconvenience to her family, especially her twins. She is concerned that the complications of her pregnancy are due to her prior terminations. Has some ambivalence re this pregnancy. Pt strongly desires dc home when possible to make it easier on her twins who do not want to stay at her grandmother's home because they want to sleep in their own beds. Will hydrate, begin indocin and antibiotics, and monitor. Continue nifedipine 30mg XL BID. May have vistaril for anxiety. If no evidence of PTL, will plan cervical cerclage on 12/2. As patient has had stadol and phenergan at this time, will wait on consent until this has worn off. Out of work for remainder of pregnancy. Some issue with STD, but patient is unclear as to what the issue is. Asked to bring info to the office at next visit. Blayne Trejo MD     Spent 75 minutes on floor providing patient care.

## 2021-12-01 NOTE — PROGRESS NOTES
Per dr Shantel Kessler- ok to void oob for a urine specimen  uds to be ordered too    Back to bed, stadol and phenergan given. Flagyl started.  Pt resting with her eyes closed

## 2021-12-01 NOTE — LETTER
12/2/2021 8:53 AM    Ms. [facility] [id number]      Post-emergent cerclage activity limitations-     · Do not need to be on complete bed rest as this raises risks of complications for mom- however she should remain sedentary. · Pt should remain at home and up no more than 10-15min at a time with decreased activity and no heavy lifting >15#. · Someone else should do cleaning/vacuuming/mopping/walk dogs, as well as, grocery shopping/carrying in bags. · Patient should not cook or bake greater 10 minutes at a time. · Maintain pelvic rest.   · Avoid submersion in tub for at least 2 weeks post-op; in [address] or ocean for remainder of pregnancy. · At this time, recommend not driving for greater than 10-15 minutes at a time. · Out of work for remainder of pregnancy.            Sincerely,        Nish Salmeron MD

## 2021-12-01 NOTE — PROGRESS NOTES
Ampicillin given. Indocin given. Stadol and phenergan ordered for pain.   Will get a clean cath UA and UDS

## 2021-12-02 ENCOUNTER — ANESTHESIA (OUTPATIENT)
Dept: LABOR AND DELIVERY | Age: 36
End: 2021-12-02
Payer: COMMERCIAL

## 2021-12-02 ENCOUNTER — ANESTHESIA EVENT (OUTPATIENT)
Dept: LABOR AND DELIVERY | Age: 36
End: 2021-12-02
Payer: COMMERCIAL

## 2021-12-02 ENCOUNTER — HOSPITAL ENCOUNTER (OUTPATIENT)
Dept: PHYSICAL THERAPY | Age: 36
Discharge: HOME | End: 2021-12-02
Attending: OBSTETRICS & GYNECOLOGY

## 2021-12-02 PROBLEM — O34.32 CERVICAL CERCLAGE SUTURE PRESENT IN SECOND TRIMESTER: Status: ACTIVE | Noted: 2021-12-02

## 2021-12-02 PROCEDURE — 74011250636 HC RX REV CODE- 250/636: Performed by: OBSTETRICS & GYNECOLOGY

## 2021-12-02 PROCEDURE — 76210000064 HC RECOV POST SURG EA 0.5 HR: Performed by: OBSTETRICS & GYNECOLOGY

## 2021-12-02 PROCEDURE — G0378 HOSPITAL OBSERVATION PER HR: HCPCS

## 2021-12-02 PROCEDURE — 59320 REVISION OF CERVIX: CPT | Performed by: OBSTETRICS & GYNECOLOGY

## 2021-12-02 PROCEDURE — 76060000032 HC ANESTHESIA 0.5 TO 1 HR: Performed by: OBSTETRICS & GYNECOLOGY

## 2021-12-02 PROCEDURE — 74011250637 HC RX REV CODE- 250/637: Performed by: OBSTETRICS & GYNECOLOGY

## 2021-12-02 PROCEDURE — 96376 TX/PRO/DX INJ SAME DRUG ADON: CPT

## 2021-12-02 PROCEDURE — 96375 TX/PRO/DX INJ NEW DRUG ADDON: CPT

## 2021-12-02 PROCEDURE — 77030003665 HC NDL SPN BBMI -A: Performed by: NURSE ANESTHETIST, CERTIFIED REGISTERED

## 2021-12-02 PROCEDURE — 74011000258 HC RX REV CODE- 258: Performed by: OBSTETRICS & GYNECOLOGY

## 2021-12-02 PROCEDURE — 74011000250 HC RX REV CODE- 250: Performed by: ANESTHESIOLOGY

## 2021-12-02 PROCEDURE — 2709999900 HC NON-CHARGEABLE SUPPLY: Performed by: OBSTETRICS & GYNECOLOGY

## 2021-12-02 PROCEDURE — 77030002986 HC SUT PROL J&J -A: Performed by: OBSTETRICS & GYNECOLOGY

## 2021-12-02 PROCEDURE — 77030007880 HC KT SPN EPDRL BBMI -B: Performed by: NURSE ANESTHETIST, CERTIFIED REGISTERED

## 2021-12-02 PROCEDURE — 74011000250 HC RX REV CODE- 250: Performed by: OBSTETRICS & GYNECOLOGY

## 2021-12-02 PROCEDURE — 99226 PR SBSQ OBSERVATION CARE/DAY 35 MINUTES: CPT | Performed by: OBSTETRICS & GYNECOLOGY

## 2021-12-02 PROCEDURE — 74011000250 HC RX REV CODE- 250: Performed by: NURSE ANESTHETIST, CERTIFIED REGISTERED

## 2021-12-02 PROCEDURE — 76010000389 HC LDRP PROCEDURE 0.5 TO 1 HR: Performed by: OBSTETRICS & GYNECOLOGY

## 2021-12-02 PROCEDURE — 74011250636 HC RX REV CODE- 250/636: Performed by: NURSE ANESTHETIST, CERTIFIED REGISTERED

## 2021-12-02 PROCEDURE — 74011250636 HC RX REV CODE- 250/636: Performed by: ANESTHESIOLOGY

## 2021-12-02 RX ORDER — LANOLIN ALCOHOL/MO/W.PET/CERES
3 CREAM (GRAM) TOPICAL
Status: DISCONTINUED | OUTPATIENT
Start: 2021-12-02 | End: 2021-12-02

## 2021-12-02 RX ORDER — LORAZEPAM 2 MG/ML
2 INJECTION INTRAMUSCULAR ONCE
Status: COMPLETED | OUTPATIENT
Start: 2021-12-02 | End: 2021-12-02

## 2021-12-02 RX ORDER — SODIUM CHLORIDE, SODIUM LACTATE, POTASSIUM CHLORIDE, CALCIUM CHLORIDE 600; 310; 30; 20 MG/100ML; MG/100ML; MG/100ML; MG/100ML
75 INJECTION, SOLUTION INTRAVENOUS CONTINUOUS
Status: DISCONTINUED | OUTPATIENT
Start: 2021-12-02 | End: 2021-12-04 | Stop reason: HOSPADM

## 2021-12-02 RX ORDER — SODIUM CHLORIDE 0.9 % (FLUSH) 0.9 %
5-40 SYRINGE (ML) INJECTION EVERY 8 HOURS
Status: DISCONTINUED | OUTPATIENT
Start: 2021-12-02 | End: 2021-12-04 | Stop reason: HOSPADM

## 2021-12-02 RX ORDER — HYDROCODONE BITARTRATE AND ACETAMINOPHEN 5; 325 MG/1; MG/1
1 TABLET ORAL ONCE
Status: COMPLETED | OUTPATIENT
Start: 2021-12-02 | End: 2021-12-02

## 2021-12-02 RX ORDER — SODIUM CHLORIDE, SODIUM LACTATE, POTASSIUM CHLORIDE, CALCIUM CHLORIDE 600; 310; 30; 20 MG/100ML; MG/100ML; MG/100ML; MG/100ML
INJECTION, SOLUTION INTRAVENOUS
Status: DISCONTINUED | OUTPATIENT
Start: 2021-12-02 | End: 2021-12-02 | Stop reason: HOSPADM

## 2021-12-02 RX ORDER — HYDROCODONE BITARTRATE AND ACETAMINOPHEN 5; 325 MG/1; MG/1
2 TABLET ORAL AS NEEDED
Status: DISCONTINUED | OUTPATIENT
Start: 2021-12-02 | End: 2021-12-02

## 2021-12-02 RX ORDER — ONDANSETRON 2 MG/ML
8 INJECTION INTRAMUSCULAR; INTRAVENOUS
Status: DISCONTINUED | OUTPATIENT
Start: 2021-12-02 | End: 2021-12-04 | Stop reason: HOSPADM

## 2021-12-02 RX ORDER — BUPIVACAINE HYDROCHLORIDE 7.5 MG/ML
INJECTION, SOLUTION INTRASPINAL
Status: COMPLETED | OUTPATIENT
Start: 2021-12-02 | End: 2021-12-02

## 2021-12-02 RX ORDER — ONDANSETRON 2 MG/ML
4 INJECTION INTRAMUSCULAR; INTRAVENOUS
Status: DISCONTINUED | OUTPATIENT
Start: 2021-12-02 | End: 2021-12-03

## 2021-12-02 RX ORDER — SODIUM CHLORIDE 0.9 % (FLUSH) 0.9 %
5-40 SYRINGE (ML) INJECTION AS NEEDED
Status: DISCONTINUED | OUTPATIENT
Start: 2021-12-02 | End: 2021-12-04 | Stop reason: HOSPADM

## 2021-12-02 RX ORDER — HYDROCODONE BITARTRATE AND ACETAMINOPHEN 5; 325 MG/1; MG/1
2 TABLET ORAL
Status: DISCONTINUED | OUTPATIENT
Start: 2021-12-02 | End: 2021-12-03

## 2021-12-02 RX ORDER — HYDROMORPHONE HYDROCHLORIDE 1 MG/ML
0.5 INJECTION, SOLUTION INTRAMUSCULAR; INTRAVENOUS; SUBCUTANEOUS
Status: DISCONTINUED | OUTPATIENT
Start: 2021-12-02 | End: 2021-12-02

## 2021-12-02 RX ORDER — POLYETHYLENE GLYCOL 3350 17 G/17G
17 POWDER, FOR SOLUTION ORAL
Status: DISCONTINUED | OUTPATIENT
Start: 2021-12-02 | End: 2021-12-04 | Stop reason: HOSPADM

## 2021-12-02 RX ORDER — OXYCODONE HYDROCHLORIDE 5 MG/1
5 TABLET ORAL
Status: DISCONTINUED | OUTPATIENT
Start: 2021-12-02 | End: 2021-12-02

## 2021-12-02 RX ADMIN — HYDROMORPHONE HYDROCHLORIDE 0.5 MG: 1 INJECTION, SOLUTION INTRAMUSCULAR; INTRAVENOUS; SUBCUTANEOUS at 12:13

## 2021-12-02 RX ADMIN — INDOMETHACIN 50 MG: 50 CAPSULE ORAL at 02:43

## 2021-12-02 RX ADMIN — NIFEDIPINE 30 MG: 30 TABLET, FILM COATED, EXTENDED RELEASE ORAL at 18:09

## 2021-12-02 RX ADMIN — METRONIDAZOLE 500 MG: 500 INJECTION, SOLUTION INTRAVENOUS at 03:21

## 2021-12-02 RX ADMIN — HYDROCODONE BITARTRATE AND ACETAMINOPHEN 1 TABLET: 5; 325 TABLET ORAL at 04:04

## 2021-12-02 RX ADMIN — AMPICILLIN SODIUM 1 G: 1 INJECTION, POWDER, FOR SOLUTION INTRAMUSCULAR; INTRAVENOUS at 02:42

## 2021-12-02 RX ADMIN — AMPICILLIN SODIUM 1 G: 1 INJECTION, POWDER, FOR SOLUTION INTRAMUSCULAR; INTRAVENOUS at 08:46

## 2021-12-02 RX ADMIN — LORAZEPAM 2 MG: 2 INJECTION INTRAMUSCULAR; INTRAVENOUS at 09:34

## 2021-12-02 RX ADMIN — FAMOTIDINE 20 MG: 20 TABLET, FILM COATED ORAL at 20:30

## 2021-12-02 RX ADMIN — HYDROCODONE BITARTRATE AND ACETAMINOPHEN 2 TABLET: 5; 325 TABLET ORAL at 18:09

## 2021-12-02 RX ADMIN — BUPIVACAINE HYDROCHLORIDE IN DEXTROSE 11.5 MG: 7.5 INJECTION, SOLUTION SUBARACHNOID at 09:52

## 2021-12-02 RX ADMIN — FAMOTIDINE 20 MG: 20 TABLET, FILM COATED ORAL at 08:45

## 2021-12-02 RX ADMIN — SODIUM CHLORIDE, SODIUM LACTATE, POTASSIUM CHLORIDE, AND CALCIUM CHLORIDE: 600; 310; 30; 20 INJECTION, SOLUTION INTRAVENOUS at 09:43

## 2021-12-02 RX ADMIN — PHENYLEPHRINE HYDROCHLORIDE 50 MCG: 10 INJECTION INTRAVENOUS at 09:57

## 2021-12-02 RX ADMIN — PROMETHAZINE HYDROCHLORIDE 25 MG: 25 INJECTION INTRAMUSCULAR; INTRAVENOUS at 20:30

## 2021-12-02 RX ADMIN — PHENYLEPHRINE HYDROCHLORIDE 50 MCG: 10 INJECTION INTRAVENOUS at 10:00

## 2021-12-02 RX ADMIN — PHENYLEPHRINE HYDROCHLORIDE 50 MCG: 10 INJECTION INTRAVENOUS at 10:03

## 2021-12-02 RX ADMIN — NIFEDIPINE 30 MG: 30 TABLET, FILM COATED, EXTENDED RELEASE ORAL at 08:45

## 2021-12-02 RX ADMIN — PROMETHAZINE HYDROCHLORIDE 25 MG: 25 INJECTION INTRAMUSCULAR; INTRAVENOUS at 13:46

## 2021-12-02 RX ADMIN — INDOMETHACIN 50 MG: 50 CAPSULE ORAL at 18:21

## 2021-12-02 RX ADMIN — HYDROXYZINE PAMOATE 25 MG: 25 CAPSULE ORAL at 20:30

## 2021-12-02 RX ADMIN — PHENYLEPHRINE HYDROCHLORIDE 100 MCG: 10 INJECTION INTRAVENOUS at 10:08

## 2021-12-02 RX ADMIN — INDOMETHACIN 50 MG: 50 CAPSULE ORAL at 08:45

## 2021-12-02 RX ADMIN — Medication 3 MG: at 20:30

## 2021-12-02 NOTE — PROGRESS NOTES
RN at bedside for assessment. Pt woken up from sound sleep. Pt c/o cramping. Pt instructed not to eat or drink anything. RN informed pt that MD is on his way in to evaluate her. Pt encouraged to get up and void. Pt states she does not need to go at this time.

## 2021-12-02 NOTE — PROGRESS NOTES
[facility] Antepartum Progress Note    31 y.o. Juan M Isaac [id number] at 22w0d by Estimated Date of Delivery: 4/7/22. Patient admitted for cervical incompetence. She complains of anxiety at this time. No contractions or pain at this time. Required 1 dose of norco at 0445. Pt is tearful this am- concerned re whether her history caused this and over lack of support from extended family. She feels that she is letting her children and family down by not being able to do everything that she normally does. Patient denies HA, abdominal pain, vision changes. No regular contractions, LOF, VB. Good FM. Minimal edema. No chest pain or shortness of breath. No significant reflux, nausea, constipation, or other GI complaints. Of note, patient reports that her pelvic/groin pain which has worsened this pregnancy actually predates pregnancy. She has been going to PT for this. She is aware that cerclage may aggravate this pain. She states that prolonged time driving sets off the pain. On re-review of her history- pt had cervical shortening followed by PTL with her twins. Made it to term.      Review of Systems - Negative except per HPI       Current Facility-Administered Medications:     LORazepam (ATIVAN) injection 2 mg, 2 mg, IntraVENous, ONCE, Carlos Gannon MD    dextrose 5% lactated ringers infusion, 100 mL/hr, IntraVENous, CONTINUOUS, [place] MD    famotidine (PEPCID) tablet 20 mg, 20 mg, Oral, Q12H, Charlene Chamberlain MD, 20 mg at 12/02/21 0845    indomethacin (INDOCIN) capsule 50 mg, 50 mg, Oral, Q6H, aCrlos Gannon MD, 50 mg at 12/02/21 0845    melatonin tablet 3 mg, 3 mg, Oral, QHS PRN, Charlene Chamberlain MD    sodium chloride (NS) flush 5-40 mL, 5-40 mL, IntraVENous, Q8H, Charlene Chamberlain MD    sodium chloride (NS) flush 5-40 mL, 5-40 mL, IntraVENous, PRN, Charlene Chamberlain MD, 20 mL at 12/01/21 1319    ampicillin (OMNIPEN) 1 g in 0.9% sodium chloride (MBP/ADV) 50 mL MBP, 1 g, IntraVENous, Q6H, Sheryl Coombs MD, Last Rate: 100 mL/hr at 21 0846, 1 g at 21 0846    metroNIDAZOLE (FLAGYL) IVPB premix 500 mg, 500 mg, IntraVENous, Q12H, Sheryl Coombs MD, Last Rate: 100 mL/hr at 21 0321, 500 mg at 21 0321    azithromycin (ZITHROMAX) 500 mg in 0.9% sodium chloride 250 mL (VIAL-MATE), 500 mg, IntraVENous, Q24H, Sheryl Coombs MD, Last Rate: 250 mL/hr at 21 1347, 500 mg at 21 1347    promethazine (PHENERGAN) with saline injection 25 mg, 25 mg, IntraVENous, Q6H PRN, Florence Gannon MD, 25 mg at 21 205    NIFEdipine ER (PROCARDIA XL) tablet 30 mg, 30 mg, Oral, BID, Florence Gannon MD, 30 mg at 21 0845    hydrOXYzine pamoate (VISTARIL) capsule 25 mg, 25 mg, Oral, TID PRN, Dejuan Sharif MD, 25 mg at 21 1859    Vitals:    Patient Vitals for the past 24 hrs:   BP   21 0845 (!) 111/55   21 0724 (!) 111/55   21 0321 100/64   21 1229 106/61     Temp (24hrs), Av.5 °F (36.9 °C), Min:98.5 °F (36.9 °C), Max:98.5 °F (36.9 °C)      I&O:  No intake/output data recorded.  1901 -  0700  In: 1000 [I.V.:1000]  Out: -     Exam:   Constitutional: Patient without distress. HEENT: Symmetric without facial palsy, uncorrected poor hearing or uncorrected poor vision. No thyroid enlargement or goiter  Chest: No use of accessory muscles or excessive work of breathing  Abdomen: gravid, soft, non-tender. Fundus: soft and non tender  Skin: normal coloration and turgor, no rashes, no suspicious skin lesions noted. Neurologic: AOx3. Gait normal. Reflexes and motor strength normal and symmetric. Cranial nerves 2-12 and sensation grossly intact.   Psychiatric: non focal    Maternal and Fetal Monitoring:                              Uterine Activity: Frequency (min): some irritability Intensity: [place] Adjusted                         Fetal Heart Rate: Mode: DopplerFetal Heart Rate: 158      Labs:  CBC:    Recent Labs 21  1348 21  1748 10/07/21  1357 21  1436 21  1830 21  1058 21  1123 21  0000 21   WBC 8.0 11.4* 8.0 7.5 10.3 7.2 8.1  --  8.7   HGB 11.3* 12.3 12.6 11.6* 13.2 13.1 13.0  --  13.5   HCT 32.3* 37.1 37.2 33.7* 38.7 37.3 38.8  --  39.0    264 300 282 300 264 270  --  274   HGBEXT  --   --   --   --   --   --   --  13.0  --    HCTEXT  --   --   --   --   --   --   --  38.8  --    PLTEXT  --   --   --   --   --   --   --  270  --      CMP:   Recent Labs     21  0554 10/07/21  1357 21  1436 21  1444 21  1831 21  1830 21  1058 21    136 134* 137 138  --  137 138   K 3.8 4.1 3.8 4.0 3.5  --  4.0 3.7    107 107 102 105  --  106 107   CO2 23 23 22 20 26  --  24 27   AGAP 7 6* 5*  --  7  --  7 4*   * 87 122* 80 86  --  98 91   BUN 6 4* 5* 5* 6  --  6 7   CREA 0.45* 0.59* 0.52* 0.62 0.58*  --  0.48* 0.62   GFRAA >60 >60 >60 136 >60  --  >60 >60   GFRNA >60 >60 >60 118 >60  --  >60 >60   CA 8.8 9.0 8.5 9.3 9.8  --  9.0 9.2   MG  --   --  1.9  --   --  1.8  --   --    ALB 2.4* 3.0* 2.7* 3.9 3.2*  --  3.2*  --    TP 5.4* 7.0 6.4 6.5 7.0  --  7.0  --    GLOB 3.0 4.0* 3.7*  --  3.8*  --  3.8*  --    AGRAT 0.8* 0.8* 0.7* 1.5 0.8*  --  0.8*  --    ALT 39 17 14 9 15  --  28  --      Recent Glucose Results:   Recent Labs     21  0554 10/07/21  1357 21  1436 21  1444 21  1831 21  1058 21  1939   * 87 122* 80 86 98 91     Prenatal Labs:    Lab Results   Component Value Date/Time    Rubella, External immune 2021 12:00 AM    HBsAg, External negative 2021 12:00 AM    HIV, External NR 2021 12:00 AM    RPR, External NR 2021 12:00 AM     31 y.o.  at 22w0d with Cervical Shortening  Will proceed with cerclage today. R/B/A reviewed. Consent previously obtained by Dr. Pilar Branch. Questions answered. Will give 2mg IV ativan on way to OR.      Pt to be on strict limitations re activity at home. While avoiding strict bedrest, pt should not be actively managing all aspects of family life. Letter of limitations to be given today. Will continue procardia BID. Pt is aware that cerclage will not fix her preexisting abdominopelvic pain. Time:45    Minutes spent on floor,with greater than 50% of the time examining patient, explaining plan and coordinating care with nurse and requesting primary physician.

## 2021-12-02 NOTE — PROGRESS NOTES
Dr. Celestin Days at bedside assessing pt and discussing risks and benefits of cerclage.      Pt agrees to procedure at this time

## 2021-12-02 NOTE — PROGRESS NOTES
[facility] Progress Note    Patient awake, not in pain. Explained recommendations of Cerclage to prevent PPROM and pregnancy loss. At rate cervix is shortening, PPROM very likely to occur in the next few weeks. Explained risks of PPROM wth cerclage, but this would be less than if cerclage not done. She wants this pregnancy and wants to do whatever it takes to have a healthy baby. She consents to Cerclage.

## 2021-12-02 NOTE — PROGRESS NOTES
Patient known to this  due to ongoing case management during pregnancy. Patient had cerclage placed earlier today. 1400:  SW attempted to meet with patient. Patient was asleep and did not awaken when calling her name. 1500:  Patient still sleeping. 1615:  SW met with patient (patient's grandmother and twins present). Discussed how patient is coping with hospitalization and today's cerclage. Patient will be on strict bedrest for the remainder of her pregnancy. Emotional support offered to patient. Discussed importance of self-care as well as utilizing her support system. Patient requested assistance with getting necessary paperwork completed while out of work. Document emailed to  for follow-up. SW will follow-up.     CLARISSA Pan, [address] [facility] [place]   [phone]

## 2021-12-02 NOTE — ANESTHESIA POSTPROCEDURE EVALUATION
Procedure(s):  CERCLAGE.    spinal    Anesthesia Post Evaluation      Multimodal analgesia: multimodal analgesia used between 6 hours prior to anesthesia start to PACU discharge  Patient location during evaluation: PACU  Patient participation: complete - patient participated  Level of consciousness: awake and alert  Pain score: 0  Pain management: adequate  Airway patency: patent  Anesthetic complications: no  Cardiovascular status: acceptable and hemodynamically stable  Respiratory status: acceptable and spontaneous ventilation  Hydration status: acceptable  Post anesthesia nausea and vomiting:  none  Final Post Anesthesia Temperature Assessment:  Normothermia (36.0-37.5 degrees C)      INITIAL Post-op Vital signs:   Vitals Value Taken Time   /65 12/02/21 1115   Temp 37 °C (98.6 °F) 12/02/21 1021   Pulse 109 12/02/21 1115   Resp 15 12/02/21 1021   SpO2 98 % 12/02/21 1115

## 2021-12-02 NOTE — PROGRESS NOTES
MFM    Pt has not voided yet. I&O vs catheter vs bed pan. Has lower abdominal cramping. Emesis of po intake. Will give antiemetics. Continue indocin, PRN norco.     No ability to move LE yet.      Will plan to remain tonight, likely dc in am.       Jayla Cahrles MD

## 2021-12-02 NOTE — ANESTHESIA PREPROCEDURE EVALUATION
Relevant Problems   No relevant active problems       Anesthetic History     PONV          Review of Systems / Medical History  Patient summary reviewed and pertinent labs reviewed    Pulmonary  Within defined limits                 Neuro/Psych   Within defined limits           Cardiovascular  Within defined limits                Exercise tolerance: >4 METS     GI/Hepatic/Renal     GERD: well controlled           Endo/Other  Within defined limits           Other Findings              Physical Exam    Airway  Mallampati: II  TM Distance: 4 - 6 cm  Neck ROM: normal range of motion   Mouth opening: Normal     Cardiovascular  Regular rate and rhythm,  S1 and S2 normal,  no murmur, click, rub, or gallop             Dental         Pulmonary  Breath sounds clear to auscultation               Abdominal         Other Findings            Anesthetic Plan    ASA: 2  Anesthesia type: spinal          Induction: Intravenous  Anesthetic plan and risks discussed with: Patient and Mother

## 2021-12-02 NOTE — PROGRESS NOTES
Pulled up a chair and sat.  Re-explained meds, plan of care, and cerclage / spinal. Pt seemed more at ease

## 2021-12-02 NOTE — PROGRESS NOTES
Chanelle Nair  : 1985  Primary: [facility]  Secondary:  [facility] [place] [address] [place] [address], [facility].  [phone]   [phone]          OUTPATIENT DAILY NOTE    NAME/AGE/GENDER: Chanelle Nair is a 31 y.o. female. DATE: 2021    Patient did not show for her appointment for today due to being in the hospital.  Will plan to follow up on next scheduled visit.     Jae Coleman, PT    Future Appointments   Date Time Provider Manuel Anton   2021  8:45 AM Jerry Rand, PT [facility]   2021  8:45 AM Eric Mendoza, PT [facility]   2021 11:00 AM [facility] ULTRASOUND 1 [facility]   2021 11:45 AM Daniella Avitia MD [facility]   2021  8:45 AM Jerry Rand, PT [facility]   2021  8:45 AM Vissage, Leandra Leventhal, PT [facility]   2021  8:45 AM Kathyastrid Mendoza, PT [facility]   2021  8:45 AM Eric Mendoza, PT [facility]   2021  8:45 AM Eric Mendoza, PT [facility]   2021  8:45 AM Mirta Maresal, PT [facility]   2021 10:15 AM Pablo Negro MD [facility]

## 2021-12-02 NOTE — PROGRESS NOTES
Pt up to bathroom with assistance; pt able to void without difficulty; pt ambulated back to bed; pt denies other needs at this time;

## 2021-12-02 NOTE — OP NOTES
[facility] Cervical Cerclage Operative Note    Pre-operative Diagnosis: Cervical Incompetence, history of  labor  Post-operative Diagnosis: Same as Above    Surgeon:  Coral Saldana MD     Anesthesia: Spinal    Procedure: Vuong Cerclage    Indications:  Warden Bush presents with a clinical history consistent with cervical incompetence. Cervical cerclage was offered for treatment of cervical incompetence and the risks were explained in detail in the office and again in the hospital prior to surgery. These included risk of infection, bleeding, bladder and bowel damage and pregnancy loss, along with rupture of membranes now or later in pregnancy. These complications were all explained in detail and questions answered. It was explained to the patient that she had the option of expectant management without cerclage placement. She understood that her care and treatment would not be affected by her choice and there was no pressure on her to choose this course of treatment. After a long discussion and clear understanding by the patient, the patient consented to the procedure prior to coming to the hospital and, again on admission to the hospital.     Procedure Details:   Warden Bush was taken to the Operating Room where spinal anesthetic was administered. The patient was then placed in the dorsal lithotomy position. The vulva and distal vagina were then gently prepped with Betadine solution and draped in a sterile fashion to expose the vaginal introitus. The patient was then placed in Trendelenburg position to allow better visualization of the vaginal canal and the cervix. An I and O catheter was used to drain 100ml clear urine from the bladder. Using retractors, the cervix and distal vagina were visualized. The vaginal portion of the cervical length was severely shortened.   The external cervical os appeared to be open and the internal os was dilated 1 cm to manual exam. Fetal membranes were not visualized. The cervix and distal vagina were again gently washed carefully with Betadine solution and dried with sterile sponges. A long atraumatic Allis clamp was used to retract the cervix by grasping a significant portio of the cervix, carefully placed to avoid membranes at the 10 o'clock position. Using 5mm Mersaline, the first bite of the Vuong stitch was placed at the 12 o'clock position on the cervix at the junction of the vaginal mucosa and the portio of the cervix with the suture placed through the cervical stroma, careful to avoid cervical canal and amniotic sac, between mucosa and cervical canal.  Shallow bites placed in a purse string fashion with sequential grasping and releasing of the cervix with the Allis clamp to retract the cervical stroma and allow for placement of suture at the junction of the vaginal mucosa and the portio of the cervix. Care was used in grasping the cervix to be atraumatic and to cause as little of trauma and bleeding as possible. The last stitch ended at approximately the 12 o'clock again, needle cut and both sutures were gently retracted to take up any slack in the suture and a finger placed in the cervix and retraction of both ends of suture and internal os noted to be closed with suture tension. The suture was evaluated visually in all quadrants and felt to be at the junction of the vaginal mucosa and the portio of the cervix without including any sidewall and without including bladder or bowel. The suture was then tied with 5 square knots. The cervix was checked again and visualized and the cervix remained pink with no significantly bleeding, internal os palpated and was tightly closed. A 2-0 silk suture was place through both ends of suture aproximately 1 cm distal to knots and then tied. Cervix and vagina were again evaluated and no bleeding was noted. The cervix remained pink. Instruments and retractors were removed.  Rectal exam was performed and no sutures were noted in the rectum. At the end of the procedure, sponge, instrument and needle counts were noted to be correct. Estimated Blood Loss:  20cc    Suture Type: 5mm Mersaline    Number of Sutures: 1    Findings:  Significant softening and ripening of multiparous cervix with shortening for gestational age. Pt tolerated well. Uncomplicated.        Signed By: Cathy Lowery MD 12/2/2021

## 2021-12-02 NOTE — PROGRESS NOTES
Dr. Scarlett Riley called. MD states that he will be at the hospital in about 20 min to evaluate pt for a possible cerclage. MD asks to make sure pt is NPO.

## 2021-12-02 NOTE — ANESTHESIA PROCEDURE NOTES
Spinal Block    Start time: 12/2/2021 9:49 AM  End time: 12/2/2021 9:53 AM  Performed by: Kenia Zacarias MD  Authorized by: Kenia Zacarias MD     Pre-procedure:   Indications: at surgeon's request and primary anesthetic  Preanesthetic Checklist: patient identified, risks and benefits discussed, anesthesia consent, site marked, patient being monitored and timeout performed    Timeout Time: 09:48 EST          Spinal Block:   Patient Position:  Seated  Prep Region:  Lumbar  Prep: DuraPrep      Location:  L2-3  Technique:  Single shot    Local Dose (mL):  3    Needle:   Needle Type:  Pencan  Needle Gauge:  25 G  Attempts:  1      Events: CSF confirmed, no blood with aspiration and no paresthesia        Assessment:  Insertion:  Uncomplicated  Patient tolerance:  Patient tolerated the procedure well with no immediate complications

## 2021-12-03 PROCEDURE — G0378 HOSPITAL OBSERVATION PER HR: HCPCS

## 2021-12-03 PROCEDURE — 74011250637 HC RX REV CODE- 250/637: Performed by: OBSTETRICS & GYNECOLOGY

## 2021-12-03 PROCEDURE — 99226 PR SBSQ OBSERVATION CARE/DAY 35 MINUTES: CPT | Performed by: OBSTETRICS & GYNECOLOGY

## 2021-12-03 RX ORDER — NIFEDIPINE 30 MG/1
30 TABLET, EXTENDED RELEASE ORAL EVERY 12 HOURS
Status: DISCONTINUED | OUTPATIENT
Start: 2021-12-03 | End: 2021-12-04 | Stop reason: HOSPADM

## 2021-12-03 RX ORDER — INDOMETHACIN 50 MG/1
50 CAPSULE ORAL EVERY 6 HOURS
Status: DISCONTINUED | OUTPATIENT
Start: 2021-12-03 | End: 2021-12-04 | Stop reason: HOSPADM

## 2021-12-03 RX ADMIN — INDOMETHACIN 50 MG: 50 CAPSULE ORAL at 01:16

## 2021-12-03 RX ADMIN — FAMOTIDINE 20 MG: 20 TABLET, FILM COATED ORAL at 22:29

## 2021-12-03 RX ADMIN — INDOMETHACIN 50 MG: 50 CAPSULE ORAL at 13:59

## 2021-12-03 RX ADMIN — INDOMETHACIN 50 MG: 50 CAPSULE ORAL at 19:56

## 2021-12-03 RX ADMIN — NIFEDIPINE 30 MG: 30 TABLET, FILM COATED, EXTENDED RELEASE ORAL at 07:51

## 2021-12-03 RX ADMIN — HYDROCODONE BITARTRATE AND ACETAMINOPHEN 2 TABLET: 5; 325 TABLET ORAL at 01:16

## 2021-12-03 RX ADMIN — HYDROCODONE BITARTRATE AND ACETAMINOPHEN 2 TABLET: 5; 325 TABLET ORAL at 11:40

## 2021-12-03 RX ADMIN — HYDROXYZINE PAMOATE 25 MG: 25 CAPSULE ORAL at 22:29

## 2021-12-03 RX ADMIN — INDOMETHACIN 50 MG: 50 CAPSULE ORAL at 07:51

## 2021-12-03 RX ADMIN — FAMOTIDINE 20 MG: 20 TABLET, FILM COATED ORAL at 07:51

## 2021-12-03 RX ADMIN — Medication 3 MG: at 22:29

## 2021-12-03 RX ADMIN — NIFEDIPINE 30 MG: 30 TABLET, FILM COATED, EXTENDED RELEASE ORAL at 19:56

## 2021-12-03 NOTE — PROGRESS NOTES
Patient was upset upon SW entering the room. Patient was provided the opportunity to share about current stressors, including lack of familial support system, challenging family dynamics, and conflicting feelings about pregnancy/hospitalization. Patient identified difference between family/friends who provide consistent support and those that lack empathy/support. Discussed importance of utilizing positive supports and removing herself from negative people/situations. Per patient, FOB (Elvin Bonilla) is a strong source of support and encouragement to patient. Emotional support provided by . SW encouraged patient to practice self-care and focus on the health/wellbeing of herself and baby Ava. SW will continue to follow.     CLARISSA Lee, [facility]   [phone]

## 2021-12-03 NOTE — PROGRESS NOTES
[facility] Antepartum Progress Note    31 y.o. Oanh ms [id number] at 22w1d by Estimated Date of Delivery: 4/7/22. Patient admitted for  Cervical shortening, pelvic and abdominal pain. Evi Ou POD#1 from Emergent Cerclage Placement. No complication of leaking or bleeding. Patient complaining of same pelvic abdominal pain that she has stated for last 6 weeks. No contractions noted on [place].      Review of Systems: A comprehensive review of systems was negative except for that written in the HPI.        Current Facility-Administered Medications:     indomethacin (INDOCIN) capsule 50 mg, 50 mg, Oral, Q6H, Ioana Alba MD, 50 mg at 12/03/21 1359    lactated Ringers infusion, 75 mL/hr, IntraVENous, CONTINUOUS, Carline John MD    ondansetron [facility]) injection 4 mg, 4 mg, IntraVENous, Q6H PRN, Carline John MD    ondansetron [facility]) injection 8 mg, 8 mg, IntraVENous, Q6H PRN, Bessie Gannon MD    polyethylene glycol (MIRALAX) powder 17 g, 17 g, Oral, DAILY PRN, Bessie Gannon MD    HYDROcodone-acetaminophen (NORCO) 5-325 mg per tablet 2 Tablet, 2 Tablet, Oral, Q6H PRN, Bessie Gannon MD, 2 Tablet at 12/03/21 1140    sodium chloride (NS) flush 5-40 mL, 5-40 mL, IntraVENous, Q8H, Bessie Gannon MD    sodium chloride (NS) flush 5-40 mL, 5-40 mL, IntraVENous, PRN, Bessie Gannon MD    famotidine (PEPCID) tablet 20 mg, 20 mg, Oral, Q12H, Ioana Alba MD, 20 mg at 12/03/21 0751    melatonin tablet 3 mg, 3 mg, Oral, QHS PRN, Ioana Alba MD, 3 mg at 12/02/21 2030    sodium chloride (NS) flush 5-40 mL, 5-40 mL, IntraVENous, Q8H, Ioana Alba MD    sodium chloride (NS) flush 5-40 mL, 5-40 mL, IntraVENous, PRN, Ioana Alba MD, 20 mL at 12/01/21 1319    promethazine (PHENERGAN) with saline injection 25 mg, 25 mg, IntraVENous, Q6H PRN, Bessie Gannon MD, 25 mg at 12/02/21 2030    NIFEdipine ER (PROCARDIA XL) tablet 30 mg, 30 mg, Oral, BID, Bessie Gannon, MD, 30 mg at 21 0751    hydrOXYzine pamoate (VISTARIL) capsule 25 mg, 25 mg, Oral, TID PRN, Shruti Gannon MD, 25 mg at 21      Vitals:  Patient Vitals for the past 24 hrs:   BP   21 0746 (!) 103/55   21 0119 (!) 99/54   21 2034 126/72     Temp (24hrs), Av °F (36.7 °C), Min:97.9 °F (36.6 °C), Max:98 °F (36.7 °C)      I&O:  No intake/output data recorded.  1901 -  0700  In: 1000 [I.V.:1000]  Out: 101 [Urine:100]    Exam:   Constitutional: Patient with distress. HEENT: Symmetric without facial palsy, uncorrected poor hearing or uncorrected poor vision. No thyroid enlargement or goiter  Chest: No use of accessory muscles or excessive work of breathing  Abdomen:  gravid, soft, mild diffuse tenderness with guarding. Bowel sounds normal.   Fundus: mildly tender  Right Upper Quadrant: non-tender  Genitourinary: deferred as without complaints of labor or ROM  Cervical Exam:      Membranes: Membrane Status: Intact  Lower Extremities:  Edema: No bilaterally  Patellar Reflexes: 1+ bilaterally  Clonus: absent  Skin: normal coloration and turgor, no rashes, no suspicious skin lesions noted. Neurologic: AOx3. Gait normal. Reflexes and motor strength normal and symmetric. Cranial nerves 2-12 and sensation grossly intact.   Psychiatric: depressed, anxious    Maternal and Fetal Monitoring:                              Uterine Activity: Frequency (min): some irritability Intensity: [place] Adjusted  Fetal Heart Rate: Mode: ExternalFetal Heart Rate: 155           Labs:   Recent Labs     21  1348 21  1748 10/07/21  1357 21  1436 21  1830 21  1058 21  1123 21  0000   WBC 8.0 11.4* 8.0 7.5 10.3 7.2   < >  --    HGB 11.3* 12.3 12.6 11.6* 13.2 13.1   < >  --     264 300 282 300 264   < >  --    HGBEXT  --   --   --   --   --   --   --  13.0   PLTEXT  --   --   --   --   --   --   --  270    < > = values in this interval not displayed. Assessment and Recommendations:  31 y.o.  at 22w1d. Exam identical to previous exams over the last 4 weeks. Cannot determine uterine pain, pelvic pain from cervical pain. No contraction on the monitor. Will continue antibiotics and indocin. Evaluate in am for ability to go home. Patient Active Problem List    Diagnosis     labor in second trimester     2021 at [facility]: Placed on toco when pt c/o lower uterine pain during ultrasound. Irritability noted on toco.  2021 at [facility]: Took indocin after last visit but states it didn't work; CL shortened from prior. High risk for PTD. 2021 at [facility]: Patient to go to [facility] for 24 hour observation due to abdominal pain and shortening cervix. 2021 at [facility]: Denies PTL. SEE HIGH RISK OVERVIEW        High-risk pregnancy in second trimester     Undecided on genetic testing  28 week GTT _____  2021 at [facility]: Normal NT and nasal bone. Genetic testing done 21.    10/26/2021 at [facility]: Normal early anatomy, limited echo d/t gestational age. CL 3.7 cm; pt reports cramping/ lower abdominal tightness. 2021 at [facility]:  CL 3.1cm   2021 at [facility]: CL 2.7cm. C/o abdominal tightening constantly. Worst with activity or pressure. Increases in evenings. 2021 at [facility]: CL 2.3 cm. Funneling with debris. Slightly shorter from 1 week ago. C/o abdominal tightening and suprapubic pain and back pain, especially at night. Doubled over today, Diffuse abdominal tenderness. Cannot rule out PTL, Cervical insufficiency or other cause. Since cervix continues to shorten, will admit for Indocin and pain control and evaluation. 2021 at [facility]: Normal anatomy and echo; AC 23%, CARISSA WNL. Short but unchanged CL 2.4cm. Taking Procardia 30mg XL every day. Denies CTXs. Not using vaginal progesterone.   2021 at [facility]: Patient with significant abdominal pain, for last 3 days, no leaking or bleeding, Similar to previous episodes; Cervical length much shorter today at 0.7 cm. Taking Procardia 30mg XL po BID. Sent directly to hospital for Indocin, Antibiotics and evaluation.  Back pain affecting pregnancy in second trimester     10/26/2021 at [facility]: Pt reporting left sciatic pain that worsens with movement. 11/16/2021 at [facility]: Pt reporting back pain, going to PT regularly and states PT \"helps a little\". 11/22/2021 at [facility]: Pt reports PT is helping    SEE Cervical insufficicency Overview      Hx of uterine surgery affecting current pregnancy     D&C x 10 per patient due to hx of EABs and MABs in the first trimester  9/30/2021 at [facility]:  · Continue serial endovaginal cervical length until 28-32 weeks. · PTL and cervical insufficiency precautions given.  Abdominal pain during pregnancy in second trimester     11/4/2021 [facility]: uterine irritability, pain in lower abdomen relieved with counter pressure. ?constipation component. 11/11/2021 at [facility]: continues to c/o abdominal pain not relieved by indocin. Simethicone Rx  PT    SEE Cervical Insufficiency Overview      Pregnancy headache in second trimester     10/26/2021 at [facility]: Pt with migraine-like headaches that are not relieved with rest or Tylenol. 11/16/2021 at [facility]: No headache today, states had one this past weekend relieved by Fioricet. 11/22/2021 at [facility]: C/o HA during weekend. Had to take Fioricet x1.  Current pregnancy with history of pre-term labor in second trimester     [facility] had PTL and was on magnesium per pt  9/30/2021 at [facility]: c/o frequent \"uterine cramping\" and back pain. Suspect it is due to dehydration and vomiting. Hopeful of improvement after IV hydration  10/26/2021 at [facility]: c/o LLQ pain and lower back pain. Hasn't had a bowel movement in over a week. Stress the importance of regular bowel movements and staying hydrated. 11/11/2021 [facility]: midpelvic pain. BM daily now. Indocin did not help pain.  Will try Mag Ox 300mg + 2 tums BID.   Has some orthostatic HTN currently, would not tolerate procardia.  Aborter, habitual, antepartum     Patient reports hx of MAB x 5 and EAB x 5      Depression affecting pregnancy     No current medications  9/30/2021 at [facility]: Depressed due to hyperemesis  10/26/2021 at [facility]: Patient teary today d/t issues with hyperemesis, being pregnant, issues with work and FMLA, denies SI/HI  11/22/2021 at [facility]:  Reports stable mood. 12/1/2021 at [facility]: Teary when sent to hospital    · Refer to [facility], [facility] to check in      Gastroesophageal reflux in pregnancy     9/30/2021 at [facility]: Prescribed po Pepcid BID- has had emesis with it. Takes prn  · Will begin PPI q am.       Rh negative status during pregnancy     ABS-    Rhogam-      Encounter for immunization     Pt has not received Covid vaccine but is interested in receiving      Cervical cerclage suture present in second trimester     12/2/21 by ED      Cervical insufficiency during pregnancy in second trimester, antepartum     11/16/21 CL 2.3cm  11/22/2021 at [facility]: CL 2.4 cm. Takes Procardia daily. · Cerclage versus pessary placement based on cervical examination based on gestational age. · PTL and cervical insufficiency precautions given. · Serial endovaginal cervical evaluation as needed for symptoms. · Work and activity limitations reviewed  · Recommend primary OB taking out of work for remainder of pregnancy; note given. OB office to complete any STD/FMLA paperwork. Soila Luna MD  12/3/2021     Time:45   Minutes spent on floor,with greater than 50% of the time examining patient, explaining plan and coordinating care with nurse and requesting primary physician.

## 2021-12-04 VITALS
TEMPERATURE: 98.2 F | HEIGHT: 63 IN | BODY MASS INDEX: 29.23 KG/M2 | RESPIRATION RATE: 14 BRPM | OXYGEN SATURATION: 99 % | DIASTOLIC BLOOD PRESSURE: 56 MMHG | WEIGHT: 165 LBS | SYSTOLIC BLOOD PRESSURE: 117 MMHG | HEART RATE: 83 BPM

## 2021-12-04 PROCEDURE — 74011250637 HC RX REV CODE- 250/637: Performed by: OBSTETRICS & GYNECOLOGY

## 2021-12-04 PROCEDURE — G0378 HOSPITAL OBSERVATION PER HR: HCPCS

## 2021-12-04 PROCEDURE — 99217 PR OBSERVATION CARE DISCHARGE MANAGEMENT: CPT | Performed by: OBSTETRICS & GYNECOLOGY

## 2021-12-04 RX ORDER — FAMOTIDINE 20 MG/1
20 TABLET, FILM COATED ORAL
Qty: 90 TABLET | Refills: 2 | Status: SHIPPED | OUTPATIENT
Start: 2021-12-04 | End: 2022-03-07

## 2021-12-04 RX ADMIN — NIFEDIPINE 30 MG: 30 TABLET, FILM COATED, EXTENDED RELEASE ORAL at 08:26

## 2021-12-04 RX ADMIN — FAMOTIDINE 20 MG: 20 TABLET, FILM COATED ORAL at 08:26

## 2021-12-04 RX ADMIN — INDOMETHACIN 50 MG: 50 CAPSULE ORAL at 05:23

## 2021-12-04 NOTE — DISCHARGE INSTRUCTIONS
Patient Education   Patient Education        Cervical Cerclage to Prevent  Delivery: What to Expect at Home  Your Recovery  Cervical cerclage (say \"SER-vuh-kul ser-KLAZH\") is a procedure that helps keep your cervix from opening too soon. Your doctor has sewn your cervix shut to help prevent  labor. For the next few days, you may have:  · Cramping. · Spotting. · Pain when you urinate. Your doctor may give you instructions on when you can do your normal activities again, such as driving and going back to work. Your doctor will remove the stitches around your cervix at 37 weeks, or if you go into  labor or show signs of infection. This care sheet gives you a general idea about how long it will take for you to recover. But each person recovers at a different pace. Follow the steps below to get better as quickly as possible. How can you care for yourself at home? Activity    · Rest when you feel tired.     · Ask your doctor when it is okay for you to have sex. Medicines    · Be safe with medicines. Read and follow all instructions on the label.     · If you are not taking a prescription pain medicine, ask your doctor if you can take an over-the-counter medicine.     · Your doctor will tell you if and when you can restart your medicines. He or she will also give you instructions about taking any new medicines. Follow-up care is a key part of your treatment and safety. Be sure to make and go to all appointments, and call your doctor if you are having problems. It's also a good idea to know your test results and keep a list of the medicines you take. When should you call for help? Call 911 anytime you think you may need emergency care. For example, call if:    · You have severe trouble breathing.     · You have sudden, severe pain in your belly.     · You have severe vaginal bleeding.    Call your doctor now or seek immediate medical care if:    · You have new pelvic pain, or the pain in your pelvis gets worse.     · You have a new discharge from your vagina.     · You have a new or higher fever. Watch closely for changes in your health, and be sure to contact your doctor if you have any problems. Where can you learn more? Go to http://www.gray.com/  Enter T050 in the search box to learn more about \"Cervical Cerclage to Prevent  Delivery: What to Expect at Home. \"  Current as of: 2021               Content Version: 13.0  © 8141-4650 [employer]. Care instructions adapted under license by [employer] (which disclaims liability or warranty for this information). If you have questions about a medical condition or this instruction, always ask your healthcare professional. [address] any warranty or liability for your use of this information. Pregnancy Precautions: Care Instructions  Your Care Instructions     There is no sure way to prevent labor before your due date ( labor) or to prevent most other pregnancy problems. But there are things you can do to increase your chances of a healthy pregnancy. Go to your appointments, follow your doctor's advice, and take good care of yourself. Eat well, and exercise (if your doctor agrees). And make sure to drink plenty of water. Follow-up care is a key part of your treatment and safety. Be sure to make and go to all appointments, and call your doctor if you are having problems. It's also a good idea to know your test results and keep a list of the medicines you take. How can you care for yourself at home? · Make sure you go to your prenatal appointments. At each visit, your doctor will check your blood pressure. Your doctor will also check to see if you have protein in your urine. High blood pressure and protein in urine are signs of preeclampsia. This condition can be dangerous for you and your baby. · Drink plenty of fluids.  Dehydration can cause contractions. If you have kidney, heart, or liver disease and have to limit fluids, talk with your doctor before you increase the amount of fluids you drink. · Tell your doctor right away if you notice any symptoms of an infection, such as:  ? Burning when you urinate. ? A foul-smelling discharge from your vagina. ? Vaginal itching. ? Unexplained fever. ? Unusual pain or soreness in your uterus or lower belly. · Eat a balanced diet. Include plenty of foods that are high in calcium and iron. ? Foods high in calcium include milk, cheese, yogurt, almonds, and broccoli. ? Foods high in iron include red meat, shellfish, poultry, eggs, beans, raisins, whole-grain bread, and leafy green vegetables. · Do not smoke. If you need help quitting, talk to your doctor about stop-smoking programs and medicines. These can increase your chances of quitting for good. · Do not drink alcohol or use marijuana or illegal drugs. · Follow your doctor's directions about activity. Your doctor will let you know how much, if any, exercise you can do. · Ask your doctor if you can have sex. If you are at risk for early labor, your doctor may ask you to not have sex. · Take care to prevent falls. During pregnancy, your joints are loose, and your balance is off. Sports such as bicycling, skiing, or in-line skating can increase your risk of falling. And don't ride horses or motorcycles, dive, water ski, scuba dive, or parachute jump while you are pregnant. · Avoid getting very hot. Do not use saunas or hot tubs. Avoid staying out in the sun in hot weather for long periods. Take acetaminophen (Tylenol) to lower a high fever. · Do not take any over-the-counter or herbal medicines or supplements without talking to your doctor or pharmacist first.  When should you call for help? Call 911  anytime you think you may need emergency care.  For example, call if:    · You passed out (lost consciousness).     · You have a seizure.     · You have severe vaginal bleeding.     · You have severe pain in your belly or pelvis.     · You have had fluid gushing or leaking from your vagina and you know or think the umbilical cord is bulging into your vagina. If this happens, immediately get down on your knees so your rear end (buttocks) is higher than your head. This will decrease the pressure on the cord until help arrives. Call your doctor now or seek immediate medical care if:    · You have signs of preeclampsia, such as:  ? Sudden swelling of your face, hands, or feet. ? New vision problems (such as dimness, blurring, or seeing spots). ? A severe headache.     · You have any vaginal bleeding.     · You have belly pain or cramping.     · You have a fever.     · You have had regular contractions (with or without pain) for an hour. This means that you have 8 or more within 1 hour or 4 or more in 20 minutes after you change your position and drink fluids.     · You have a sudden release of fluid from your vagina.     · You have low back pain or pelvic pressure that does not go away.     · You notice that your baby has stopped moving or is moving much less than normal.   Watch closely for changes in your health, and be sure to contact your doctor if you have any problems. Where can you learn more? Go to http://www.Ogone.com/  Enter Y951 in the search box to learn more about \"Pregnancy Precautions: Care Instructions. \"  Current as of: June 16, 2021               Content Version: 13.0  © 0025-3418 [employer]. Care instructions adapted under license by [employer] (which disclaims liability or warranty for this information). If you have questions about a medical condition or this instruction, always ask your healthcare professional. [place] [address] any warranty or liability for your use of this information.

## 2021-12-04 NOTE — PROGRESS NOTES
Pt resting in bed, shift assessment complete, no abnormalities noted, denies any pain, LOF or bleeding. Pt states \"I am ready to go home. \" Dr. Isaura Noyola notified.

## 2021-12-04 NOTE — DISCHARGE SUMMARY
[facility] Antepartum Progress Note    31 y.o. Melita Smallwood [id number] at 22w2d by Estimated Date of Delivery: 22. Patient admitted for cervical incompetence and  labor. Hospital Day 4, POD#3. She complains of Minimal pelvic pain, no pressure. Patient denies HA, abdominal pain, vision changes. No regular contractions, LOF, VB. Good FM. No chest pain or shortness of breath. No significant reflux, nausea, constipation, or other GI complaints. Review of Systems: A comprehensive review of systems was negative except for that written in the HPI.        Current Facility-Administered Medications:     indomethacin (INDOCIN) capsule 50 mg, 50 mg, Oral, Q6H, Tyler Alvarenga MD, 50 mg at 21 0523    NIFEdipine ER (PROCARDIA XL) tablet 30 mg, 30 mg, Oral, Q12H, Tyler Alvarenga MD, 30 mg at 21 9504    lactated Ringers infusion, 75 mL/hr, IntraVENous, CONTINUOUS, Gina John MD    ondansetron [facility]) injection 8 mg, 8 mg, IntraVENous, Q6H PRN, Linnea Gannon MD    polyethylene glycol (MIRALAX) powder 17 g, 17 g, Oral, DAILY PRN, Linnea Gannon MD    sodium chloride (NS) flush 5-40 mL, 5-40 mL, IntraVENous, Q8H, Linnea Gannon MD    sodium chloride (NS) flush 5-40 mL, 5-40 mL, IntraVENous, PRN, Linnea Gannon MD    famotidine (PEPCID) tablet 20 mg, 20 mg, Oral, Q12H, Tyler Alvarenga MD, 20 mg at 21 1184    melatonin tablet 3 mg, 3 mg, Oral, QHS PRN, Tyler Alvarenga MD, 3 mg at 21 2229    sodium chloride (NS) flush 5-40 mL, 5-40 mL, IntraVENous, Q8H, Tyler Alvarenga MD    sodium chloride (NS) flush 5-40 mL, 5-40 mL, IntraVENous, PRN, Tyler Alvarenga MD, 20 mL at 21 1319    promethazine (PHENERGAN) with saline injection 25 mg, 25 mg, IntraVENous, Q6H PRN, Linnea Gannon MD, 25 mg at 21 2030    hydrOXYzine pamoate (VISTARIL) capsule 25 mg, 25 mg, Oral, TID PRN, Linnea Gannon MD, 25 mg at 21 1714      Vitals: Patient Vitals for the past 24 hrs:   BP   21 0826 (!) 117/56   21 0524 (!) 88/50   21 (!) 112/57   21 (!) 112/     Temp (24hrs), Av.2 °F (36.8 °C), Min:98 °F (36.7 °C), Max:98.3 °F (36.8 °C)      I&O:  No intake/output data recorded. No intake/output data recorded. Exam:   Constitutional: Patient without distress. Abdomen: gravid, soft, tender without masses. Bowel sounds normal.   Fundus: soft and non tender  Right Upper Quadrant: non-tender  Genitourinary: deferred as without complaints of labor or ROM  Cervical Exam:      Membranes: Membrane Status: Intact  Lower Extremities:  Edema: No bilaterally  Patellar Reflexes: 1+ bilaterally  Clonus: absent  Skin: normal coloration and turgor, no rashes, no suspicious skin lesions noted. Neurologic: AOx3. Gait normal. Reflexes and motor strength normal and symmetric. Cranial nerves 2-12 and sensation grossly intact. Psychiatric: non focal    Maternal and Fetal Monitoring:                              Uterine Activity: Frequency (min): 0 Intensity: [place] Adjusted  Fetal Heart Rate: Mode: ExternalFetal Heart Rate: 155                 Labs:   Recent Labs     21  1348 21  1748 10/07/21  1357 21  1436 21  1830 21  1058 21  1123 21  0000   WBC 8.0 11.4* 8.0 7.5 10.3 7.2   < >  --    HGB 11.3* 12.3 12.6 11.6* 13.2 13.1   < >  --     264 300 282 300 264   < >  --    HGBEXT  --   --   --   --   --   --   --  13.0   PLTEXT  --   --   --   --   --   --   --  270    < > = values in this interval not displayed. Assessment and Recommendations:  31 y.o.  at 22w2d. POD#2 from cerclage. Pelvic pain and abdomonal pain much improved, she wants to go home. Told to limit activity and note for family to instruct them what patient cannot do. Will follow up at [facility] in 2 weeks. Will stop Indocin and antibiotics.     Patient Active Problem List    Diagnosis Date Noted     labor in second trimester 2021    High-risk pregnancy in second trimester 2021    Back pain affecting pregnancy in second trimester 10/26/2021    Hx of uterine surgery affecting current pregnancy 2021    Abdominal pain during pregnancy in second trimester 2021    Pregnancy headache in second trimester 10/26/2021    Current pregnancy with history of pre-term labor in second trimester 2021    Aborter, habitual, antepartum 2021    Depression affecting pregnancy 10/16/2019    Gastroesophageal reflux in pregnancy 10/16/2019    Rh negative status during pregnancy 2021    Encounter for immunization 2021    Cervical cerclage suture present in second trimester 2021    Cervical insufficiency during pregnancy in second trimester, antepartum 2021            Jesus Smyth MD  2021     Time: 54 Minutes spent on floor,with greater than 50% of the time examining patient, explaining plan and coordinating care with nurse and requesting primary physician.

## 2021-12-06 ENCOUNTER — HOSPITAL ENCOUNTER (OUTPATIENT)
Dept: PHYSICAL THERAPY | Age: 36
Discharge: HOME | End: 2021-12-06
Attending: OBSTETRICS & GYNECOLOGY

## 2021-12-06 NOTE — PROGRESS NOTES
Paco Shown  : 1985  Primary: [facility]*  Secondary:  [facility] [place] [address], [facility].  [phone]   [phone]          OUTPATIENT DAILY NOTE    NAME/AGE/GENDER: Paco Shown is a 31 y.o. female. DATE: 2021    Patient cancelled (more than 24 hours ago) her appointments for this week due to medical procedure. Patient is planning on coming to her appointment on 2021.     Zaki Izquierdo, CHAY    Future Appointments   Date Time Provider Manuel Anton   2021 11:00 AM [facility] ULTRASOUND 1 [facility]   2021 11:45 AM Giana Morales MD [facility]   2021  7:00 PM Luiz Ashton, PT [facility]   2021  8:45 AM Lulu Mares, PT [facility]   2021  8:45 AM Lulu Mares, PT [facility]   2021  8:45 AM Luiz Ashton, PT [facility]   2021  8:45 AM Luiz Ashton, PT [facility]   2021  8:45 AM Luiz Ashton, PT [facility]   2021  8:45 AM Lulu Mares, PT [facility]   2021 10:15 AM MD Olga Fung

## 2021-12-07 ENCOUNTER — TELEPHONE (OUTPATIENT)
Dept: CASE MANAGEMENT | Age: 36
End: 2021-12-07

## 2021-12-07 NOTE — TELEPHONE ENCOUNTER
Phone call to patient at [phone] to check-in. Discussed how patient has been doing since our last conversation. Patient shared how she is relying on other family members/FOB instead of unsupportive family members. SW reiterated importance of self-care and maintaining bed rest.      Patient denied any needs from  at this time. Patient has this 's contact information for any needs/questions.       CLARISSA Peters, [address] [facility]   [phone]

## 2021-12-09 ENCOUNTER — APPOINTMENT (OUTPATIENT)
Dept: PHYSICAL THERAPY | Age: 36
End: 2021-12-09
Attending: OBSTETRICS & GYNECOLOGY

## 2021-12-12 ENCOUNTER — HOSPITAL ENCOUNTER (OUTPATIENT)
Age: 36
Discharge: HOME | End: 2021-12-12
Attending: OBSTETRICS & GYNECOLOGY | Admitting: OBSTETRICS & GYNECOLOGY
Payer: COMMERCIAL

## 2021-12-12 VITALS — TEMPERATURE: 98.1 F | RESPIRATION RATE: 18 BRPM | WEIGHT: 165 LBS | BODY MASS INDEX: 29.23 KG/M2 | HEIGHT: 63 IN

## 2021-12-12 PROBLEM — R10.2 PELVIC PAIN IN ANTEPARTUM PERIOD IN SECOND TRIMESTER: Status: ACTIVE | Noted: 2021-12-12

## 2021-12-12 PROBLEM — O26.892 PELVIC PAIN IN ANTEPARTUM PERIOD IN SECOND TRIMESTER: Status: ACTIVE | Noted: 2021-12-12

## 2021-12-12 PROCEDURE — 76815 OB US LIMITED FETUS(S): CPT

## 2021-12-12 PROCEDURE — 76817 TRANSVAGINAL US OBSTETRIC: CPT

## 2021-12-12 PROCEDURE — 99283 EMERGENCY DEPT VISIT LOW MDM: CPT

## 2021-12-12 PROCEDURE — 74011250637 HC RX REV CODE- 250/637: Performed by: OBSTETRICS & GYNECOLOGY

## 2021-12-12 RX ORDER — NIFEDIPINE 30 MG/1
30 TABLET, EXTENDED RELEASE ORAL
Status: COMPLETED | OUTPATIENT
Start: 2021-12-12 | End: 2021-12-12

## 2021-12-12 RX ADMIN — NIFEDIPINE 30 MG: 30 TABLET, FILM COATED, EXTENDED RELEASE ORAL at 09:44

## 2021-12-12 NOTE — DISCHARGE INSTRUCTIONS
Patient Education      Stay on bedrest at home. Family at home need to help with cooking and house chores  Take meds as prescribed  May use tylenol and benadryl as directed on package for help with pain and sleep. Belly Pain in Pregnancy: Care Instructions  Overview     When you're pregnant, any belly pain can be a worry. You may not want to call your doctor or midwife about every pain you have. But you don't want to miss something that is dangerous for you or your baby. Even if it feels familiar, belly pain can mean something new when you're pregnant. It's important to know when to call your doctor or midwife. It will also help to know how to care for yourself at home when your pain is not caused by anything harmful. · When belly pain is more severe or constant, see a doctor or midwife right away. · If you're sure your belly pain is a sign of labor, call your doctor or midwife. · When belly pain is brief, it's usually a normal part of pregnancy. It might be related to changes in the growing uterus. Or it could be the stretching of ligaments called round ligaments. These ligaments help support the uterus. Round ligament pain can be on either side of your belly. It can also be felt in your hips or groin. Follow-up care is a key part of your treatment and safety. Be sure to make and go to all appointments, and call your doctor if you are having problems. It's also a good idea to know your test results and keep a list of the medicines you take. How can you tell if belly pain is a sign of labor? When belly pain is caused by labor, it can feel like mild or menstrual-like cramps in your lower belly. These cramps are probably contractions. They can happen in your second or third trimester. You may also have:  · A steady, dull ache in your lower back, pelvis, or thighs. · A feeling of pressure in your pelvis or lower belly.   · Changes in your vaginal discharge or a sudden release of fluid from the vagina. If you think you are in labor, call your doctor. How can you care for yourself at home? When belly pain is mild and is not a symptom of labor:  · Rest until you feel better. · Take a warm bath. · Think about what you drink and eat:  ? Drink plenty of fluids. Choose water and other clear liquids until you feel better. ? Try eating small, frequent meals. If your stomach is upset, try bland, low-fat foods like plain rice, broiled chicken, toast, and yogurt. · Think about how you move if you are having brief pains from stretching of the round ligaments. ? Try gentle stretching. ? Move a little more slowly when turning in bed or getting up from a chair, so those ligaments don't stretch quickly. ? Lean forward a bit if you think you are going to cough or sneeze. When should you call for help? Call 911  anytime you think you may need emergency care. For example, call if:    · You have sudden, severe pain in your belly.     · You have severe vaginal bleeding.     · You passed out (lost consciousness).     · You have a seizure. Call your doctor now or seek immediate medical care if:    · You have new or worse belly pain or cramping.     · You have any vaginal bleeding.     · You have a fever.     · You have symptoms of preeclampsia, such as:  ? Sudden swelling of your face, hands, or feet. ? New vision problems (such as dimness, blurring, or seeing spots). ? A severe headache.     · You think that you may be in labor. This means that you've had at least 8 contractions within 1 hour or at least 4 contractions within 20 minutes, even after you change your position and drink fluids.     · You have symptoms of a urinary tract infection. These may include:  ? Pain or burning when you urinate. ? A frequent need to urinate without being able to pass much urine. ? Pain in the flank, which is just below the rib cage and above the waist on either side of the back. ? Blood in your urine.    Watch closely for changes in your health, and be sure to contact your doctor if you are worried about your or your baby's health. Where can you learn more? Go to http://www.Wallstr.com/  Enter B275 in the search box to learn more about \"Belly Pain in Pregnancy: Care Instructions. \"  Current as of: June 16, 2021               Content Version: 13.0  © 5310-8105 [employer]. Care instructions adapted under license by [employer] (which disclaims liability or warranty for this information). If you have questions about a medical condition or this instruction, always ask your healthcare professional. [place] [address] any warranty or liability for your use of this information.

## 2021-12-12 NOTE — ED PROVIDER NOTES
Chief Complaint: pelvic cramping and pain      31 y.o. female G12 with 1 vaginal delivery of twins and 10 sab's or eab's now  at 23w3d  weeks gestation with a cerclage  who is seen for moderate abdominal pain. Pt reports ongoing cramping abdominal pain. she says this is worse last night and today. She cannot sleep because of the pain. No vag bleeding or discharge. No constipation or urinary symptoms. Pt reports good fetal movement. Pt is on nifedipine XL bid, but she has not yet taken her meds this am. She is not on progesterone or magnesium po because in the past these have not worked for her. HISTORY:    Social History     Substance and Sexual Activity   Sexual Activity Yes    Partners: Male    Birth control/protection: None     Patient's last menstrual period was 07/01/2021.     Social History     Socioeconomic History    Marital status: SINGLE     Spouse name: Not on file    Number of children: Not on file    Years of education: Not on file    Highest education level: Not on file   Occupational History    Not on file   Tobacco Use    Smoking status: Former Smoker    Smokeless tobacco: Never Used   Vaping Use    Vaping Use: Former   Substance and Sexual Activity    Alcohol use: No    Drug use: No    Sexual activity: Yes     Partners: Male     Birth control/protection: None   Other Topics Concern     Service Not Asked    Blood Transfusions Not Asked    Caffeine Concern Not Asked    Occupational Exposure Not Asked    Hobby Hazards Not Asked    Sleep Concern Not Asked    Stress Concern Not Asked    Weight Concern Not Asked    Special Diet Not Asked    Back Care Not Asked    Exercise Not Asked    Bike Helmet Not Asked   [address] Not Asked    Self-Exams Not Asked   Social History Narrative    Not on file     Social Determinants of Health     Financial Resource Strain:     Difficulty of Paying Living Expenses: Not on file   Food Insecurity:     Worried About Running Out of Food in the Last Year: Not on file    Ran Out of Food in the Last Year: Not on file   Transportation Needs:     Lack of Transportation (Medical): Not on file    Lack of Transportation (Non-Medical): Not on file   Physical Activity:     Days of Exercise per Week: Not on file    Minutes of Exercise per Session: Not on file   Stress:     Feeling of Stress : Not on file   Social Connections:     Frequency of Communication with Friends and Family: Not on file    Frequency of Social Gatherings with Friends and Family: Not on file    Attends Alevism Services: Not on file    Active Member of [address] [place] [address] [employer] or Organizations: Not on file    Attends Club or Organization Meetings: Not on file    Marital Status: Not on file   Intimate Partner Violence:     Fear of Current or Ex-Partner: Not on file    Emotionally Abused: Not on file    Physically Abused: Not on file    Sexually Abused: Not on file   Housing Stability:     Unable to Pay for Housing in the Last Year: Not on file    Number of [place] in the Last Year: Not on file    Unstable Housing in the Last Year: Not on file       Past Surgical History:   Procedure Laterality Date    HX DILATION AND CURETTAGE      x9       Past Medical History:   Diagnosis Date    Anemia     Chlamydia 2006    GERD (gastroesophageal reflux disease) 10/16/2019    Gonorrhea 2006    High cholesterol     as a child    Hyperemesis affecting pregnancy, antepartum 8/18/2021    Zofran pump orders faxed to [facility] 8/19/21- unable to reach pt 9/9/21 pt provided with [facility] contact information to get Zofran pump initiated [facility]/30/2021 at [facility]: Has had Zofran pump for past 2 weeks; managed by [facility]- currently 6mg over 6hr; C/o hyperemesis since 6 weeks. C/o nausea all day with vomiting 3-4 times; sometimes bile colored. Uses phenergan suppositories at night.   Pt scared to eat bec    MDD (major depressive disorder), recurrent episode ([facility] [address] 10/16/2019    Polycystic disease, ovaries     Post concussion syndrome 10/16/2019    Post concussion syndrome 10/16/2019    Sickle cell trait syndrome ([facility] [address]     Vaginal discharge in pregnancy in second trimester 11/3/2021         ROS:  A 12 point review of symptoms negative except for chief complaint as described above. PHYSICAL EXAM:  Temperature 98.1 °F (36.7 °C), resp. rate 18, height 5' 3\" (1.6 m), weight 74.8 kg (165 lb), last menstrual period 2021, currently breastfeeding. Constitutional: The patient appears well, alert, oriented x 3. Cardiovascular: Heart RRR, no murmurs.    Respiratory: Lungs clear, no respiratory distress  GI: Abdomen soft, nontender, no guarding  No fundal tenderness  Musculoskeletal: no cva tenderness  Upper ext: no edema, reflexes +2  Lower ext: no edema, neg clary's, reflexes +2  Skin: no rashes or lesions  Psychiatric:Mood/ Affect: appropriate  Genitourinary: SVE:deferred  FHT: appropriate gest age  TOCO:rare contractions  Bedside ultrasound- vertex, subjectively normal veto, good fetal movement  transvag ultrasound - cervical length 1.9 and 2.1, no funneling even with pressure    ua - unremarkable    I personally reviewed pt's medical record including relevant labs and ultrasounds  I reviewed the NST at today's encounter    Assessment/Plan:  32 yo G12P 1 0 10 2 at 23w3d with cerclage in place here for pelvic pain in second trimester  No evidence of  labor  Unremarkable ua  Home with precautions  Continue nifedipine  Pt declines progesterone and po mag

## 2021-12-12 NOTE — PROGRESS NOTES
Bedside ultrasound- vertex, subjectively normal veto, good fetal movement  transvag ultrasound - cervical length 1.9 and 2.1, no funneling even with pressure

## 2021-12-16 ENCOUNTER — APPOINTMENT (OUTPATIENT)
Dept: PHYSICAL THERAPY | Age: 36
End: 2021-12-16
Attending: OBSTETRICS & GYNECOLOGY

## 2021-12-21 ENCOUNTER — TELEPHONE (OUTPATIENT)
Dept: CASE MANAGEMENT | Age: 36
End: 2021-12-21

## 2021-12-21 ENCOUNTER — APPOINTMENT (OUTPATIENT)
Dept: PHYSICAL THERAPY | Age: 36
End: 2021-12-21
Attending: OBSTETRICS & GYNECOLOGY

## 2021-12-21 NOTE — TELEPHONE ENCOUNTER
Phone call to patient at [phone] to check-in.     No answer; voicemail is full - unable to leave message.  [address] [facility], [place], [address] [facility]   [phone]

## 2021-12-23 ENCOUNTER — APPOINTMENT (OUTPATIENT)
Dept: PHYSICAL THERAPY | Age: 36
End: 2021-12-23
Attending: OBSTETRICS & GYNECOLOGY

## 2021-12-27 ENCOUNTER — APPOINTMENT (OUTPATIENT)
Dept: PHYSICAL THERAPY | Age: 36
End: 2021-12-27
Attending: OBSTETRICS & GYNECOLOGY

## 2021-12-30 ENCOUNTER — APPOINTMENT (OUTPATIENT)
Dept: PHYSICAL THERAPY | Age: 36
End: 2021-12-30
Attending: OBSTETRICS & GYNECOLOGY

## 2022-01-01 ENCOUNTER — HOSPITAL ENCOUNTER (EMERGENCY)
Age: 37
Discharge: HOME | End: 2022-01-01
Attending: OBSTETRICS & GYNECOLOGY | Admitting: OBSTETRICS & GYNECOLOGY
Payer: COMMERCIAL

## 2022-01-01 VITALS
HEIGHT: 63 IN | OXYGEN SATURATION: 99 % | DIASTOLIC BLOOD PRESSURE: 64 MMHG | TEMPERATURE: 98.6 F | SYSTOLIC BLOOD PRESSURE: 113 MMHG | HEART RATE: 116 BPM | RESPIRATION RATE: 18 BRPM | WEIGHT: 175 LBS | BODY MASS INDEX: 31.01 KG/M2

## 2022-01-01 PROBLEM — R19.7 DIARRHEA DURING PREGNANCY: Status: ACTIVE | Noted: 2022-01-01

## 2022-01-01 PROBLEM — O98.513 VIRAL INFECTIOUS DISEASE IN MOTHER DURING THIRD TRIMESTER OF PREGNANCY: Status: RESOLVED | Noted: 2022-01-01 | Resolved: 2022-01-01

## 2022-01-01 PROBLEM — O98.513 VIRAL INFECTIOUS DISEASE IN MOTHER DURING THIRD TRIMESTER OF PREGNANCY: Status: ACTIVE | Noted: 2022-01-01

## 2022-01-01 PROBLEM — U07.1 COVID-19 AFFECTING PREGNANCY IN SECOND TRIMESTER: Status: ACTIVE | Noted: 2022-01-01

## 2022-01-01 PROBLEM — O26.899 DIARRHEA DURING PREGNANCY: Status: ACTIVE | Noted: 2022-01-01

## 2022-01-01 PROBLEM — O98.512 COVID-19 AFFECTING PREGNANCY IN SECOND TRIMESTER: Status: ACTIVE | Noted: 2022-01-01

## 2022-01-01 LAB
COVID-19 RAPID TEST, COVR: DETECTED
SOURCE, COVRS: ABNORMAL

## 2022-01-01 PROCEDURE — 87635 SARS-COV-2 COVID-19 AMP PRB: CPT

## 2022-01-01 PROCEDURE — 99285 EMERGENCY DEPT VISIT HI MDM: CPT

## 2022-01-01 PROCEDURE — 74011250637 HC RX REV CODE- 250/637: Performed by: OBSTETRICS & GYNECOLOGY

## 2022-01-01 PROCEDURE — 96372 THER/PROPH/DIAG INJ SC/IM: CPT

## 2022-01-01 PROCEDURE — 74011250636 HC RX REV CODE- 250/636: Performed by: OBSTETRICS & GYNECOLOGY

## 2022-01-01 RX ORDER — PROMETHAZINE HYDROCHLORIDE 25 MG/ML
12.5 INJECTION, SOLUTION INTRAMUSCULAR; INTRAVENOUS
Status: DISCONTINUED | OUTPATIENT
Start: 2022-01-01 | End: 2022-01-01 | Stop reason: HOSPADM

## 2022-01-01 RX ORDER — ONDANSETRON 4 MG/1
4 TABLET, ORALLY DISINTEGRATING ORAL
Qty: 12 TABLET | Refills: 0 | Status: ON HOLD | OUTPATIENT
Start: 2022-01-01 | End: 2022-02-11 | Stop reason: SDUPTHER

## 2022-01-01 RX ORDER — ACETAMINOPHEN 500 MG
500 TABLET ORAL
Qty: 30 TABLET | Refills: 0 | Status: SHIPPED | OUTPATIENT
Start: 2022-01-01 | End: 2022-03-07

## 2022-01-01 RX ORDER — ACETAMINOPHEN 500 MG
1000 TABLET ORAL
Status: DISCONTINUED | OUTPATIENT
Start: 2022-01-01 | End: 2022-01-01 | Stop reason: HOSPADM

## 2022-01-01 RX ADMIN — PROMETHAZINE HYDROCHLORIDE 12.5 MG: 25 INJECTION INTRAMUSCULAR; INTRAVENOUS at 18:44

## 2022-01-01 RX ADMIN — ACETAMINOPHEN 1000 MG: 500 TABLET, FILM COATED ORAL at 18:42

## 2022-01-01 NOTE — PROGRESS NOTES
Patient presents to [facility] 1. RN to bedside for assessment. Patient states she is here to be tested for COVID after calling her OB. Dr. Nava Pu notified. Triage to be completed.

## 2022-01-01 NOTE — H&P
[facility] History & Physical    Name: Erna Arguello MRN: [id number]  SSN: [id number]    YOB: 1985  Age: 31 y.o. Sex: female      Subjective:     Chief Complaint:  Pregnancy and body aches, Nausea and diarrhea    History of Present Illness: Ms. Josh Parish is a 31 y.o. female with an estimated gestational age of 34w1d with Estimated Date of Delivery: 22. Patient complains of diarrhea for 1 days as well as body aches and N/V. Pregnancy has been complicated by history of recurrent pregnancy loss, anemia, GERD, hx of STI. Patient denies abdominal pain  , contractions, pelvic pressure, right upper quadrant pain  , shortness of breath, swelling, vaginal bleeding , vaginal leaking of fluid  and back pain and dysuria. She reports body aches began last night. She had diarrhea that began today as well as nausea. She vomited twice. She denies any fevers or sick contacts. She did see all of her of family over [place]. She is able to tolerate PO fluids. She endorses active fetus.     OB History    Para Term  AB Living   12 1 1 0 10 2   SAB IAB Ectopic Molar Multiple Live Births   2 8     1 2      # Outcome Date GA Lbr Elvin/2nd Weight Sex Delivery Anes PTL Lv   12 Current            11 IAB            10 16 6w0d          9 IAB            8 SAB 14 7w0d          7 IAB            6 IAB 01/01/10 7w0d          5 IAB 09 7w0d          4 IAB 09 7w0d          3 IAB 08 7w0d          2A Term 07 38w0d  2.863 kg F Vag-Spont EPI Y BRUNO      Complications:  labor, Hyperemesis affecting pregnancy, antepartum   2B Term 07 38w0d  2.353 kg M Vag-Spont EPI Y BURNO      Complications:  labor, Hyperemesis affecting pregnancy, antepartum   1 IAB 04 7w0d            Past Medical History:   Diagnosis Date    Anemia     Chlamydia 2006    GERD (gastroesophageal reflux disease) 10/16/2019    Gonorrhea 2006    High cholesterol     as a child    Hyperemesis affecting pregnancy, antepartum 8/18/2021    Zofran pump orders faxed to [facility] 8/19/21- unable to reach pt 9/9/21 pt provided with [facility] contact information to get Zofran pump initiated 9/30/2021 at [facility]: Has had Zofran pump for past 2 weeks; managed by [facility]- currently 6mg over 6hr; C/o hyperemesis since 6 weeks. C/o nausea all day with vomiting 3-4 times; sometimes bile colored. Uses phenergan suppositories at night. Pt scared to eat bec    MDD (major depressive disorder), recurrent episode ([address] 10/16/2019    Polycystic disease, ovaries     Post concussion syndrome 10/16/2019    Post concussion syndrome 10/16/2019    Sickle cell trait syndrome ([facility] [address]     Vaginal discharge in pregnancy in second trimester 11/3/2021     Past Surgical History:   Procedure Laterality Date    HX DILATION AND CURETTAGE      x9     Social History     Occupational History    Not on file   Tobacco Use    Smoking status: Former Smoker    Smokeless tobacco: Never Used   Vaping Use    Vaping Use: Former   Substance and Sexual Activity    Alcohol use: No    Drug use: No    Sexual activity: Yes     Partners: Male     Birth control/protection: None      Family History   Problem Relation Age of Onset    Diabetes Father     Breast Problems Other     Cancer Paternal Grandfather         Prostate CA    Crohn's Disease Sister     Seizures Sister     Thyroid Disease Paternal Grandmother        Allergies   Allergen Reactions    Reglan [Metoclopramide] Other (comments)     Jittery       Prior to Admission medications    Medication Sig Start Date End Date Taking? Authorizing Provider   acetaminophen (TYLENOL) 500 mg tablet Take 1 Tablet by mouth every six (6) hours as needed for Pain or Fever. 1/1/22  Yes Davida Lewis MD   ondansetron (ZOFRAN ODT) 4 mg disintegrating tablet Take 1 Tablet by mouth every eight (8) hours as needed for Nausea or Vomiting.  1/1/22  Yes Davida Lewis MD   hydrOXYzine pamoate (VISTARIL) 25 mg capsule Take 1 Capsule by mouth two (2) times daily as needed for Anxiety (uterine irritability). 21  Yes Pablo Negro MD   famotidine (PEPCID) 20 mg tablet Take 1 Tablet by mouth nightly. 21  Yes Lionel Sams MD   NIFEdipine ER (PROCARDIA XL) 30 mg ER tablet Take 1 Tablet by mouth two (2) times a day. 21  Yes Daniella Avitia MD   calcium carbonate (TUMS) 200 mg calcium (500 mg) chew Take 2 Tablets by mouth two (2) times a day. Patient not taking: Reported on 21   Daniella Avitia MD   magnesium oxide (MAG-OX) 400 mg tablet Take 1 Tablet by mouth two (2) times a day. Patient not taking: Reported on 21   Daniella Avitia MD   progesterone (Prometrium) 200 mg capsule Insert 1 Capsule into vagina nightly. Nightly through 36 weeks gestation  Indications:  labor prevention  Patient not taking: Reported on 2021   Daniella Avitia MD   promethazine (PHENERGAN) 25 mg suppository Insert 1 Suppository into rectum every six (6) hours as needed for Nausea. Patient not taking: Reported on 2022 10/26/21   Lionel Sams MD   PNV Comb #2-Iron-FA-Omega 3 29-1-400 mg cmpk Take  by mouth. Patient not taking: Reported on 2022    Provider, Historical        Review of Systems:  Constitutional: Body aches   HENT: Negative. Eyes: Negative. Respiratory: Negative. Cardiovascular: Negative. Gastrointestinal: Nausea, vomiting, diarrhea  Genitourinary: Negative. Musculoskeletal: Negative. Skin: Negative. Neurological: Negative. Endo/Heme/Allergies: Negative. Psychiatric/Behavioral: Negative.        Objective:     Vitals:    Vitals:    22 1822 22 18222 18322   BP:       Pulse: (!) 114 (!) 121 (!) 118 (!) 116   Resp:       Temp:       SpO2: 99% 98% 98% 99%   Weight:       Height:          Temp (24hrs), Av.6 °F (37 °C), Min:98.6 °F (37 °C), Max:98.6 °F (37 °C)  97-99% on RA, pulse 116 bpm  BP  Min: 113/64  Max: 113/64     Physical Exam:  Constitutional: The patient appears well, alert, oriented x 3. Cardiovascular: Heart RRR, no murmurs. Respiratory: Lungs clear, no respiratory distress  GI: Abdomen soft, nontender, no guarding  No fundal tenderness  Musculoskeletal: no cva tenderness  Upper ext: no edema, reflexes +2  Lower ext: no edema, neg clary's, reflexes +2  Skin: no rashes or lesions  Psychiatric:Mood/ Affect: appropriate  Genitourinary: deferred  Uterine Activity:  None  Fetal Heart Rate:  Baseline: 165 beats per minute  Variability: moderate  Accelerations: yes  Decelerations: none       Lab/Data Review:  Recent Results (from the past 24 hour(s))   COVID-19 RAPID TEST    Collection Time: 01/01/22  6:20 PM   Result Value Ref Range    Specimen source NASAL SWAB      COVID-19 rapid test Detected (AA) NOTD         Assessment and Plan: Active Problems:    Diarrhea during pregnancy (1/1/2022)      COVID-19 affecting pregnancy in second trimester (1/1/2022)       COVID 19 infection - will treat symptomatically, tylenol prn, phenergan prn, IVF rehydration now. I reviewed need for isolation/quarantine per CDC guidelines, notify contacts. E-rx for zofran prn N/V. Enc PO hydration. Recommend supplementing with vitamins D, C, zinc and probiotic. Discussed precautions in detail and when to call. Patient expressed understanding. Reviewed with Dr. Jacquie Neumann who will have office follow up with phone call with patient on Monday 1/3/22. FWB overall reassuring - appropriate tracing for GA. D/C to home once IVF completed.

## 2022-01-02 NOTE — PROGRESS NOTES
D/c instructions given to and reviewed with pt. Pt voiced understanding.      2020- Pt taken to personal vehicle in no distress by [facility]

## 2022-01-02 NOTE — DISCHARGE INSTRUCTIONS
Patient may not take any cold or flu medicine with phenylephrine or with ibuprofen. Please take Vitamin C, Vitamin D, a probiotic, zinc lozenges, and any cold medicine that does not have phenylephrine or ibuprofen. Per updated CDC guidelines, you are to quarantine for 5 days from the onset of your symptoms as long as your symptoms are improving. IF YOUR SYMPTOMS ARE NOT IMPROVING YOU WILL NEED TO F/U WITH YOUR PRIMARY MD AND REMAIN ON QUARANTINE UNTIL THEY RELEASE YOU. Tylenol 1000 mg will help with body aches and fever. Take as directed. Drink plenty of water and rest. Report to [facility] if you notice leaking of fluid, vaginal bleeding, decreased fetal movement. Dr. Marielos Galarza has sent you a rx to Zofran to your pharmacy. Patient Education        Learning About COVID-19 and Flu Symptoms  How can you tell COVID-19 from the flu? COVID-19 and the flu have similar symptoms. The two can be hard to tell apart. The only way to know for sure which illness you have is to be tested. Since the symptoms are so alike, it makes sense to act as if you have COVID-19 until your test results come back. This means staying home and limiting contact with people in your home. You'll need to wash your hands often and disinfect surfaces that you touch. And be sure to wear a mask when you're around other people. This is also good advice if you think you have the flu. COVID-19 and the flu have these symptoms in common:  · Fever or chills  · Cough  · Shortness of breath  · Fatigue (tiredness)  · Sore throat  · Runny or stuffy nose  · Muscle and body aches  · Headache  · Vomiting and diarrhea (more common in children than adults)  COVID-19 has another symptom that also may occur:  · New loss of taste or smell  COVID-19 symptoms may appear from 2 to 14 days after infection. Flu symptoms usually appear 1 to 4 days after infection. Why should you get a flu shot during the COVID-19 pandemic?   It's important to get your yearly flu vaccine. Both the flu and COVID-19 can be active at the same time. You can get sick with both infections at once. And having both may make you more sick than getting just one. The flu vaccine won't protect you from COVID-19. But it can help prevent the flu or reduce its symptoms. If fewer people get very ill with the flu, this will help free up medical resources that are needed for COVID-19 patients. Where can you learn more? Go to http://www.ferrell.com/  Enter C123 in the search box to learn more about \"Learning About COVID-19 and Flu Symptoms. \"  Current as of: March 26, 2021               Content Version: 13.0  © 2006-2021 [employer]. Care instructions adapted under license by [employer] (which disclaims liability or warranty for this information). If you have questions about a medical condition or this instruction, always ask your healthcare professional. [place] [address] any warranty or liability for your use of this information.

## 2022-01-05 ENCOUNTER — TELEPHONE (OUTPATIENT)
Dept: CASE MANAGEMENT | Age: 37
End: 2022-01-05

## 2022-01-05 NOTE — TELEPHONE ENCOUNTER
Phone call to patient at [phone] to check-in.     No answer; voicemail is full - unable to leave message.  [address] [facility], [place], [address] [facility]   [phone]

## 2022-01-10 PROBLEM — R19.7 DIARRHEA DURING PREGNANCY: Status: RESOLVED | Noted: 2022-01-01 | Resolved: 2022-01-10

## 2022-01-10 PROBLEM — O26.899 DIARRHEA DURING PREGNANCY: Status: RESOLVED | Noted: 2022-01-01 | Resolved: 2022-01-10

## 2022-01-12 PROBLEM — O34.32 CERVICAL CERCLAGE SUTURE PRESENT IN SECOND TRIMESTER: Status: RESOLVED | Noted: 2021-12-02 | Resolved: 2022-01-12

## 2022-01-12 PROBLEM — O26.892 PELVIC PAIN IN ANTEPARTUM PERIOD IN SECOND TRIMESTER: Status: RESOLVED | Noted: 2021-12-12 | Resolved: 2022-01-12

## 2022-01-12 PROBLEM — R10.2 PELVIC PAIN IN ANTEPARTUM PERIOD IN SECOND TRIMESTER: Status: RESOLVED | Noted: 2021-12-12 | Resolved: 2022-01-12

## 2022-01-13 ENCOUNTER — APPOINTMENT (OUTPATIENT)
Dept: GENERAL RADIOLOGY | Age: 37
End: 2022-01-13
Attending: OBSTETRICS & GYNECOLOGY
Payer: COMMERCIAL

## 2022-01-13 ENCOUNTER — TELEPHONE (OUTPATIENT)
Dept: CASE MANAGEMENT | Age: 37
End: 2022-01-13

## 2022-01-13 ENCOUNTER — HOSPITAL ENCOUNTER (OUTPATIENT)
Age: 37
Discharge: HOME | End: 2022-01-13
Attending: OBSTETRICS & GYNECOLOGY | Admitting: OBSTETRICS & GYNECOLOGY
Payer: COMMERCIAL

## 2022-01-13 VITALS
DIASTOLIC BLOOD PRESSURE: 55 MMHG | WEIGHT: 175 LBS | BODY MASS INDEX: 31.01 KG/M2 | HEART RATE: 96 BPM | HEIGHT: 63 IN | TEMPERATURE: 98.4 F | RESPIRATION RATE: 16 BRPM | OXYGEN SATURATION: 97 % | SYSTOLIC BLOOD PRESSURE: 107 MMHG

## 2022-01-13 PROBLEM — U07.1 COVID-19: Status: ACTIVE | Noted: 2022-01-13

## 2022-01-13 LAB
ALBUMIN SERPL-MCNC: 2.9 G/DL (ref 3.5–5)
ALBUMIN/GLOB SERPL: 0.8 {RATIO} (ref 1.2–3.5)
ALP SERPL-CCNC: 147 U/L (ref 50–130)
ALT SERPL-CCNC: 19 U/L (ref 12–65)
ANION GAP SERPL CALC-SCNC: 6 MMOL/L (ref 7–16)
AST SERPL-CCNC: 19 U/L (ref 15–37)
BASOPHILS # BLD: 0 K/UL (ref 0–0.2)
BASOPHILS NFR BLD: 0 % (ref 0–2)
BILIRUB SERPL-MCNC: 0.3 MG/DL (ref 0.2–1.1)
BUN SERPL-MCNC: 8 MG/DL (ref 6–23)
CALCIUM SERPL-MCNC: 9.3 MG/DL (ref 8.3–10.4)
CHLORIDE SERPL-SCNC: 107 MMOL/L (ref 98–107)
CO2 SERPL-SCNC: 24 MMOL/L (ref 21–32)
CREAT SERPL-MCNC: 0.5 MG/DL (ref 0.6–1)
DIFFERENTIAL METHOD BLD: ABNORMAL
EOSINOPHIL # BLD: 0.1 K/UL (ref 0–0.8)
EOSINOPHIL NFR BLD: 1 % (ref 0.5–7.8)
ERYTHROCYTE [DISTWIDTH] IN BLOOD BY AUTOMATED COUNT: 12.6 % (ref 11.9–14.6)
GLOBULIN SER CALC-MCNC: 3.8 G/DL (ref 2.3–3.5)
GLUCOSE SERPL-MCNC: 87 MG/DL (ref 65–100)
HCT VFR BLD AUTO: 35.1 % (ref 35.8–46.3)
HGB BLD-MCNC: 11.7 G/DL (ref 11.7–15.4)
IMM GRANULOCYTES # BLD AUTO: 0.2 K/UL (ref 0–0.5)
IMM GRANULOCYTES NFR BLD AUTO: 2 % (ref 0–5)
LYMPHOCYTES # BLD: 2 K/UL (ref 0.5–4.6)
LYMPHOCYTES NFR BLD: 19 % (ref 13–44)
MCH RBC QN AUTO: 30.9 PG (ref 26.1–32.9)
MCHC RBC AUTO-ENTMCNC: 33.3 G/DL (ref 31.4–35)
MCV RBC AUTO: 92.6 FL (ref 79.6–97.8)
MONOCYTES # BLD: 0.7 K/UL (ref 0.1–1.3)
MONOCYTES NFR BLD: 7 % (ref 4–12)
NEUTS SEG # BLD: 7.8 K/UL (ref 1.7–8.2)
NEUTS SEG NFR BLD: 72 % (ref 43–78)
NRBC # BLD: 0 K/UL (ref 0–0.2)
PLATELET # BLD AUTO: 281 K/UL (ref 150–450)
PMV BLD AUTO: 9.1 FL (ref 9.4–12.3)
POTASSIUM SERPL-SCNC: 3.9 MMOL/L (ref 3.5–5.1)
PROT SERPL-MCNC: 6.7 G/DL (ref 6.3–8.2)
RBC # BLD AUTO: 3.79 M/UL (ref 4.05–5.2)
SODIUM SERPL-SCNC: 137 MMOL/L (ref 136–145)
WBC # BLD AUTO: 10.9 K/UL (ref 4.3–11.1)

## 2022-01-13 PROCEDURE — 96365 THER/PROPH/DIAG IV INF INIT: CPT

## 2022-01-13 PROCEDURE — 85025 COMPLETE CBC W/AUTO DIFF WBC: CPT

## 2022-01-13 PROCEDURE — 96374 THER/PROPH/DIAG INJ IV PUSH: CPT

## 2022-01-13 PROCEDURE — 74011250636 HC RX REV CODE- 250/636: Performed by: OBSTETRICS & GYNECOLOGY

## 2022-01-13 PROCEDURE — 80053 COMPREHEN METABOLIC PANEL: CPT

## 2022-01-13 PROCEDURE — 99283 EMERGENCY DEPT VISIT LOW MDM: CPT

## 2022-01-13 PROCEDURE — 59025 FETAL NON-STRESS TEST: CPT

## 2022-01-13 PROCEDURE — 74011000250 HC RX REV CODE- 250: Performed by: OBSTETRICS & GYNECOLOGY

## 2022-01-13 PROCEDURE — 71045 X-RAY EXAM CHEST 1 VIEW: CPT

## 2022-01-13 RX ORDER — DEXTROSE, SODIUM CHLORIDE, SODIUM LACTATE, POTASSIUM CHLORIDE, AND CALCIUM CHLORIDE 5; .6; .31; .03; .02 G/100ML; G/100ML; G/100ML; G/100ML; G/100ML
1000 INJECTION, SOLUTION INTRAVENOUS CONTINUOUS
Status: DISPENSED | OUTPATIENT
Start: 2022-01-13 | End: 2022-01-13

## 2022-01-13 RX ADMIN — PROMETHAZINE HYDROCHLORIDE 12.5 MG: 25 INJECTION INTRAMUSCULAR; INTRAVENOUS at 19:10

## 2022-01-13 RX ADMIN — SODIUM CHLORIDE, SODIUM LACTATE, POTASSIUM CHLORIDE, CALCIUM CHLORIDE, AND DEXTROSE MONOHYDRATE 1000 ML/HR: 600; 310; 30; 20; 5 INJECTION, SOLUTION INTRAVENOUS at 19:10

## 2022-01-13 NOTE — H&P
Chief Complaint: weakness and body aches      31 y.o. female  at 28w0d  weeks gestation who is seen for several days of body aches, fatigue, weakness, nausea, vomiting, and pleuritic CP. Pt notes good FM. She denies VB, LOF, uterine ctx, abdominal pain, SOB, fevers, chills, diarrhea, UTI or PEC symptoms. Pt became symptomatic for and tested positive for Covid-19 2 weeks ago. HISTORY:    Social History     Substance and Sexual Activity   Sexual Activity Yes    Partners: Male    Birth control/protection: None     Patient's last menstrual period was 2021. Social History     Socioeconomic History    Marital status: SINGLE     Spouse name: Not on file    Number of children: Not on file    Years of education: Not on file    Highest education level: Not on file   Occupational History    Not on file   Tobacco Use    Smoking status: Former Smoker    Smokeless tobacco: Never Used   Vaping Use    Vaping Use: Former   Substance and Sexual Activity    Alcohol use: No    Drug use: No    Sexual activity: Yes     Partners: Male     Birth control/protection: None   Other Topics Concern     Service Not Asked    Blood Transfusions Not Asked    Caffeine Concern Not Asked    Occupational Exposure Not Asked    Hobby Hazards Not Asked    Sleep Concern Not Asked    Stress Concern Not Asked    Weight Concern Not Asked    Special Diet Not Asked    Back Care Not Asked    Exercise Not Asked    Bike Helmet Not Asked    Seat Belt Not Asked    Self-Exams Not Asked   Social History Narrative    Not on file     Social Determinants of Health     Financial Resource Strain:     Difficulty of Paying Living Expenses: Not on file   Food Insecurity:     Worried About Running Out of Food in the Last Year: Not on file    Beckie of Food in the Last Year: Not on file   Transportation Needs:     Lack of Transportation (Medical): Not on file    Lack of Transportation (Non-Medical):  Not on file Physical Activity:     Days of Exercise per Week: Not on file    Minutes of Exercise per Session: Not on file   Stress:     Feeling of Stress : Not on file   Social Connections:     Frequency of Communication with Friends and Family: Not on file    Frequency of Social Gatherings with Friends and Family: Not on file    Attends Gnosticist Services: Not on file    Active Member of [address] [place] [address] [employer] or Organizations: Not on file    Attends Club or Organization Meetings: Not on file    Marital Status: Not on file   Intimate Partner Violence:     Fear of Current or Ex-Partner: Not on file    Emotionally Abused: Not on file    Physically Abused: Not on file    Sexually Abused: Not on file   Housing Stability:     Unable to Pay for Housing in the Last Year: Not on file    Number of [place] in the Last Year: Not on file    Unstable Housing in the Last Year: Not on file       Past Surgical History:   Procedure Laterality Date    HX DILATION AND CURETTAGE      x9       Past Medical History:   Diagnosis Date    Anemia     Chlamydia 2006    Diarrhea during pregnancy 1/1/2022    GERD (gastroesophageal reflux disease) 10/16/2019    Gonorrhea 2006    High cholesterol     as a child    Hyperemesis affecting pregnancy, antepartum 8/18/2021    Zofran pump orders faxed to [facility] 8/19/21- unable to reach pt 9/9/21 pt provided with [facility] contact information to get Zofran pump initiated 9/30/2021 at [facility]: Has had Zofran pump for past 2 weeks; managed by [employer]- currently 6mg over 6hr; C/o hyperemesis since 6 weeks. C/o nausea all day with vomiting 3-4 times; sometimes bile colored. Uses phenergan suppositories at night.   Pt scared to eat bec    MDD (major depressive disorder), recurrent episode ([address] 10/16/2019    Polycystic disease, ovaries     Post concussion syndrome 10/16/2019    Post concussion syndrome 10/16/2019    Sickle cell trait syndrome ([address]     Vaginal discharge in pregnancy in second trimester 11/3/2021         ROS:  An 8 point review of symptoms negative except for chief complaint as described above. PHYSICAL EXAM:  Blood pressure (!) 107/55, pulse 96, temperature 98.4 °F (36.9 °C), resp. rate 16, height 5' 3\" (1.6 m), weight 79.4 kg (175 lb), last menstrual period 07/01/2021, SpO2 97 %, currently breastfeeding. Constitutional: The patient appears well, alert, oriented x 3. Cardiovascular: Heart RRR, no murmurs. Respiratory: Lungs clear, no respiratory distress  GI: Abdomen soft, nontender, no guarding  No fundal tenderness  Musculoskeletal: no cva tenderness  Lower ext: no edema, neg clary's, reflexes +2  Skin: no rashes or lesions  Psychiatric:Mood/ Affect: appropriate  Genitourinary: SVE: closed; cerclage knot in place  FHT: Category 1 with mod variability  TOCO: no ctx    I personally reviewed pt's medical record including relevant labs and ultrasounds  I reviewed the NST at today's encounter    Assessment/Plan:  Pt presents at 28w0d with 2 week h/o symptomatic Covid infection. Pt denies SOB, her lungs are CTA, and her O2 sat is 97-99% on RA. Obtain CBC, CMP, and shielded CXR. Administer IV fluid bolus and IV phenergan. If CXR and labs are unremarkable will discharge pt to home later tonight with strict respiratory symptoms. Pt will then f/u with her PObP.

## 2022-01-13 NOTE — TELEPHONE ENCOUNTER
Phone call to patient at [phone] to check-in.     No answer; voicemail is full - unable to leave message.  [address] [facility], [place], [address] [facility]   [phone]

## 2022-01-14 NOTE — H&P
Labs/CXR are unremarkable. Pt discharged to home with respiratory precautions. Management d/w Dr. uQirino Mcconnell who agrees.

## 2022-01-14 NOTE — DISCHARGE INSTRUCTIONS
Patient Education        Managing Morning Sickness: Care Instructions  Overview     Morning sickness can be the toughest part of early pregnancy. Some people feel mildly sick to their stomach, and others are running to the bathroom. The good news? Morning sickness usually gets better in the second trimester. It's likely that your hormones are to blame for morning sickness. But you can do things to feel better, like changing what you eat, avoiding certain foods and smells, and asking your doctor about medicines you can try. Follow-up care is a key part of your treatment and safety. Be sure to make and go to all appointments, and call your doctor if you are having problems. It's also a good idea to know your test results and keep a list of the medicines you take. How can you care for yourself at home? · Keep food in your stomach, but not too much at once. Your nausea may be worse if your stomach is empty. Eat five or six small meals a day instead of three large meals. · For morning nausea, eat a small snack, such as a couple of crackers or dry biscuits, before rising. Allow a few minutes for your stomach to settle before you get out of bed slowly. · Drink plenty of fluids. If you have kidney, heart, or liver disease and have to limit fluids, talk with your doctor before you increase the amount of fluids you drink. Some women find that peppermint tea helps with nausea. · Eat more protein, such as chicken, fish, lean meat, beans, nuts, and seeds. · Eat carbohydrate foods, such as potatoes, whole-grain cereals, rice, and pasta. · Avoid smells and foods that make you feel nauseated. Spicy or high-fat foods, citrus juice, milk, coffee, and tea with caffeine often make nausea worse. · Do not drink alcohol. · Do not smoke. Try not to be around others who smoke. If you need help quitting, talk to your doctor about stop-smoking programs and medicines. These can increase your chances of quitting for good.   · If you are taking iron supplements, ask your doctor if they are necessary. Iron can make nausea worse. · Get lots of rest. Stress and fatigue can make your morning sickness worse. · Ask your doctor about taking prescription medicine, or over-the-counter products such as vitamin B6, doxylamine, or brandon, to relieve your symptoms. Your doctor can tell you the doses that are safe for you. · Take your prenatal vitamins at night on a full stomach. When should you call for help? Call 911 anytime you think you may need emergency care. For example, call if:    · You passed out (lost consciousness). Call your doctor now or seek immediate medical care if:    · You are sick to your stomach or cannot drink fluids.     · You have symptoms of dehydration, such as:  ? Dry eyes and a dry mouth. ? Passing only a little urine. ? Feeling thirstier than usual.     · You are not able to keep down your medicine.     · You have pain in your belly or pelvis. Watch closely for changes in your health, and be sure to contact your doctor if:    · You do not get better as expected. Where can you learn more? Go to http://www.gray.com/  Enter W450 in the search box to learn more about \"Managing Morning Sickness: Care Instructions. \"  Current as of: June 16, 2021               Content Version: 13.0  © 2006-2021 [employer]. Care instructions adapted under license by [employer] (which disclaims liability or warranty for this information). If you have questions about a medical condition or this instruction, always ask your healthcare professional. [place] [address] any warranty or liability for your use of this information.

## 2022-01-23 ENCOUNTER — HOSPITAL ENCOUNTER (OUTPATIENT)
Age: 37
Discharge: HOME | End: 2022-01-23
Attending: OBSTETRICS & GYNECOLOGY | Admitting: OBSTETRICS & GYNECOLOGY
Payer: COMMERCIAL

## 2022-01-23 VITALS
SYSTOLIC BLOOD PRESSURE: 120 MMHG | RESPIRATION RATE: 18 BRPM | OXYGEN SATURATION: 99 % | HEART RATE: 115 BPM | TEMPERATURE: 98.1 F | DIASTOLIC BLOOD PRESSURE: 59 MMHG

## 2022-01-23 LAB
GLUCOSE, GLUUPC: NORMAL
KETONES UR-MCNC: NORMAL MG/DL
PROT UR QL: NEGATIVE

## 2022-01-23 PROCEDURE — 59025 FETAL NON-STRESS TEST: CPT

## 2022-01-23 PROCEDURE — 81002 URINALYSIS NONAUTO W/O SCOPE: CPT | Performed by: OBSTETRICS & GYNECOLOGY

## 2022-01-23 PROCEDURE — 99282 EMERGENCY DEPT VISIT SF MDM: CPT

## 2022-01-24 NOTE — H&P
CC  Chief Complaint   Patient presents with    Breathing Problem       History:    31 y.o. female at 29w3d weeks gestation who requesting evaluation for llq pain and some sob that is now resolved on arrival.  Patient states that she is recovering from [facility] and has been doing well. She was told by maternal-fetal medicine to come in with any contractions or shortness of breath. This evening she had approximately 1 hour of feeling slightly winded. This occurred directly after taking Vistaril and Procardia. This shortness of breath is now resolved. Pulse ox here is 98 to 100%. Patient also with left lower quadrant pain. She states she is history of severe constipation. She was on medications which were managing this. When she got COVID she began having diarrhea and she stopped all of her medications. Now that she is recovered from [facility] her bowels are backed up significantly again. She states she has having bowel movements of small jellybean size stool. Encouraged her to go back on stool softener she was on before and add fiber. She understands it is okay to use MiraLAX. Her cervix was checked this evening and is closed. HISTORY:    Social History     Substance and Sexual Activity   Sexual Activity Yes    Partners: Male    Birth control/protection: None     Patient's last menstrual period was 07/01/2021.     Social History     Socioeconomic History    Marital status: SINGLE     Spouse name: Not on file    Number of children: Not on file    Years of education: Not on file    Highest education level: Not on file   Occupational History    Not on file   Tobacco Use    Smoking status: Former Smoker    Smokeless tobacco: Never Used   Vaping Use    Vaping Use: Former   Substance and Sexual Activity    Alcohol use: No    Drug use: No    Sexual activity: Yes     Partners: Male     Birth control/protection: None   Other Topics Concern   ComponentLab Service Not Asked    Blood Transfusions Not Asked  Caffeine Concern Not Asked    Occupational Exposure Not Asked    Hobby Hazards Not Asked    Sleep Concern Not Asked    Stress Concern Not Asked    Weight Concern Not Asked    Special Diet Not Asked    Back Care Not Asked    Exercise Not Asked    Bike Helmet Not Asked   [place] Not Asked    Self-Exams Not Asked   Social History Narrative    Not on file     Social Determinants of Health     Financial Resource Strain:     Difficulty of Paying Living Expenses: Not on file   Food Insecurity:     Worried About Running Out of Food in the Last Year: Not on file    Beckie of Food in the Last Year: Not on file   Transportation Needs:     Lack of Transportation (Medical): Not on file    Lack of Transportation (Non-Medical):  Not on file   Physical Activity:     Days of Exercise per Week: Not on file    Minutes of Exercise per Session: Not on file   Stress:     Feeling of Stress : Not on file   Social Connections:     Frequency of Communication with Friends and Family: Not on file    Frequency of Social Gatherings with Friends and Family: Not on file    Attends [employer] Services: Not on file    Active Member of [address] [place] [address] or Organizations: Not on file    Attends Club or Organization Meetings: Not on file    Marital Status: Not on file   Intimate Partner Violence:     Fear of Current or Ex-Partner: Not on file    Emotionally Abused: Not on file    Physically Abused: Not on file    Sexually Abused: Not on file   Housing Stability:     Unable to Pay for Housing in the Last Year: Not on file    Number of [place] in the Last Year: Not on file    Unstable Housing in the Last Year: Not on file       Past Surgical History:   Procedure Laterality Date    HX DILATION AND CURETTAGE      x9       Past Medical History:   Diagnosis Date    Anemia     Chlamydia 2006    Diarrhea during pregnancy 1/1/2022    GERD (gastroesophageal reflux disease) 10/16/2019    Gonorrhea 2006    High cholesterol as a child    Hyperemesis affecting pregnancy, antepartum 2021    Zofran pump orders faxed to [facility] 21- unable to reach pt 21 pt provided with [facility] contact information to get Zofran pump initiated 2021 at [facility]: Has had Zofran pump for past 2 weeks; managed by [employer]- currently 6mg over 6hr; C/o hyperemesis since 6 weeks. C/o nausea all day with vomiting 3-4 times; sometimes bile colored. Uses phenergan suppositories at night. Pt scared to eat bec    MDD (major depressive disorder), recurrent episode ([address] 10/16/2019    Polycystic disease, ovaries     Post concussion syndrome 10/16/2019    Post concussion syndrome 10/16/2019    Sickle cell trait syndrome [address]     Vaginal discharge in pregnancy in second trimester 11/3/2021         ROS:  Negative:  chest pain, contractions, fever, headache , nausea and vomiting, right upper quadrant pain  , swelling, vaginal bleeding , vaginal leaking of fluid  and visual disturbances. Positive:  LLQ pain, resolved SOB    PHYSICAL EXAM:  Blood pressure (!) 120/59, pulse (!) 131, temperature 98.1 °F (36.7 °C), resp. rate 18, last menstrual period 2021, SpO2 99 %, currently breastfeeding. General: healthy, alert, well developed and cooperative  Resp:  breath sounds clear and equal bilaterally, pulse ox  %  Card:  RRR, no MRG, Tachy and murmur  Abd: nontender. Tracing: age appropriate, accels, no decels noted.   No contraction pattern    Plan:  Admit for labor management, Admit for  delivery, Discharge home with routine labor instructions and warning signs, Keep follow up appointment with regular provider as scheduled, Instructed in fetal activity monitoring

## 2022-01-30 ENCOUNTER — HOSPITAL ENCOUNTER (OUTPATIENT)
Age: 37
Discharge: HOME | End: 2022-01-30
Attending: OBSTETRICS & GYNECOLOGY | Admitting: OBSTETRICS & GYNECOLOGY
Payer: COMMERCIAL

## 2022-01-30 VITALS
RESPIRATION RATE: 20 BRPM | WEIGHT: 180 LBS | HEART RATE: 116 BPM | BODY MASS INDEX: 31.89 KG/M2 | HEIGHT: 63 IN | DIASTOLIC BLOOD PRESSURE: 76 MMHG | SYSTOLIC BLOOD PRESSURE: 126 MMHG | TEMPERATURE: 98.5 F

## 2022-01-30 PROBLEM — O26.893 ABDOMINAL PAIN DURING PREGNANCY IN THIRD TRIMESTER: Status: ACTIVE | Noted: 2022-01-30

## 2022-01-30 PROBLEM — R10.9 ABDOMINAL PAIN DURING PREGNANCY IN THIRD TRIMESTER: Status: ACTIVE | Noted: 2022-01-30

## 2022-01-30 LAB
FIBRONECTIN FETAL VAG QL: NEGATIVE
GLUCOSE, GLUUPC: NEGATIVE
KETONES UR-MCNC: NORMAL MG/DL
PROT UR QL: NEGATIVE

## 2022-01-30 PROCEDURE — 82731 ASSAY OF FETAL FIBRONECTIN: CPT

## 2022-01-30 PROCEDURE — 81002 URINALYSIS NONAUTO W/O SCOPE: CPT | Performed by: OBSTETRICS & GYNECOLOGY

## 2022-01-30 PROCEDURE — 99283 EMERGENCY DEPT VISIT LOW MDM: CPT

## 2022-01-30 PROCEDURE — 59025 FETAL NON-STRESS TEST: CPT

## 2022-01-31 NOTE — DISCHARGE INSTRUCTIONS
Patient Education        Belly Pain in Pregnancy: Care Instructions  Overview     When you're pregnant, any belly pain can be a worry. You may not want to call your doctor or midwife about every pain you have. But you don't want to miss something that is dangerous for you or your baby. Even if it feels familiar, belly pain can mean something new when you're pregnant. It's important to know when to call your doctor or midwife. It will also help to know how to care for yourself at home when your pain is not caused by anything harmful. · When belly pain is more severe or constant, see a doctor or midwife right away. · If you're sure your belly pain is a sign of labor, call your doctor or midwife. · When belly pain is brief, it's usually a normal part of pregnancy. It might be related to changes in the growing uterus. Or it could be the stretching of ligaments called round ligaments. These ligaments help support the uterus. Round ligament pain can be on either side of your belly. It can also be felt in your hips or groin. Follow-up care is a key part of your treatment and safety. Be sure to make and go to all appointments, and call your doctor if you are having problems. It's also a good idea to know your test results and keep a list of the medicines you take. How can you tell if belly pain is a sign of labor? When belly pain is caused by labor, it can feel like mild or menstrual-like cramps in your lower belly. These cramps are probably contractions. They can happen in your second or third trimester. You may also have:  · A steady, dull ache in your lower back, pelvis, or thighs. · A feeling of pressure in your pelvis or lower belly. · Changes in your vaginal discharge or a sudden release of fluid from the vagina. If you think you are in labor, call your doctor. How can you care for yourself at home? When belly pain is mild and is not a symptom of labor:  · Rest until you feel better.   · Take a warm bath.  · Think about what you drink and eat:  ? Drink plenty of fluids. Choose water and other clear liquids until you feel better. ? Try eating small, frequent meals. If your stomach is upset, try bland, low-fat foods like plain rice, broiled chicken, toast, and yogurt. · Think about how you move if you are having brief pains from stretching of the round ligaments. ? Try gentle stretching. ? Move a little more slowly when turning in bed or getting up from a chair, so those ligaments don't stretch quickly. ? Lean forward a bit if you think you are going to cough or sneeze. When should you call for help? Call 911  anytime you think you may need emergency care. For example, call if:    · You have sudden, severe pain in your belly.     · You have severe vaginal bleeding.     · You passed out (lost consciousness).     · You have a seizure. Call your doctor now or seek immediate medical care if:    · You have new or worse belly pain or cramping.     · You have any vaginal bleeding.     · You have a fever.     · You have symptoms of preeclampsia, such as:  ? Sudden swelling of your face, hands, or feet. ? New vision problems (such as dimness, blurring, or seeing spots). ? A severe headache.     · You think that you may be in labor. This means that you've had at least 8 contractions within 1 hour or at least 4 contractions within 20 minutes, even after you change your position and drink fluids.     · You have symptoms of a urinary tract infection. These may include:  ? Pain or burning when you urinate. ? A frequent need to urinate without being able to pass much urine. ? Pain in the flank, which is just below the rib cage and above the waist on either side of the back. ? Blood in your urine. Watch closely for changes in your health, and be sure to contact your doctor if you are worried about your or your baby's health. Where can you learn more?   Go to http://www.gray.com/  Enter B275 in the search box to learn more about \"Belly Pain in Pregnancy: Care Instructions. \"  Current as of: June 16, 2021               Content Version: 13.0  © 0048-7574 [employer]. Care instructions adapted under license by [employer] (which disclaims liability or warranty for this information). If you have questions about a medical condition or this instruction, always ask your healthcare professional. [place] [address] any warranty or liability for your use of this information. Patient Education     Patient Education        Round Ligament Pain: Care Instructions  Your Care Instructions     Round ligament pain is a common pain during pregnancy. You may feel a sharp brief pain on one or both sides of your belly. It may go down into your groin. It's usually felt for the first time during the second trimester. This pain is a normal part of pregnancy. It will go away as your pregnancy continues or after your baby is born. Your uterus is supported by two ligaments that go from the top and sides of the uterus to the bones of the pelvis. These are the round ligaments. As your uterus grows, these ligaments stretch and tighten with your movements. This may be the cause of the pain. You may find that certain activities seem to cause pain. If you can, avoid those activities. Your doctor can usually diagnose round ligament pain from your symptoms and an exam. If you have bleeding or other symptoms, your doctor may also do an imaging test, such as an ultrasound. Your doctor may suggest that you take an over-the-counter pain medicine, such as acetaminophen. Follow-up care is a key part of your treatment and safety. Be sure to make and go to all appointments, and call your doctor if you are having problems. It's also a good idea to know your test results and keep a list of the medicines you take. How can you care for yourself at home?   · If certain movements seem to trigger belly pain, see if you can avoid them while you are pregnant. · Stay active. If your doctor says it's okay, try moderate exercise. Water exercise may be a good choice if you have belly pain. Examples are swimming and water aerobics. · Ask your doctor about taking acetaminophen for pain. Be safe with medicines. Read and follow all instructions on the label. When should you call for help? Call your doctor now or seek immediate medical care if:    · You think you might be in labor.     · You have new or worse pain. Watch closely for changes in your health, and be sure to contact your doctor if you have any problems. Where can you learn more? Go to http://www.gray.com/  Enter R110 in the search box to learn more about \"Round Ligament Pain: Care Instructions. \"  Current as of: June 16, 2021               Content Version: 13.0  © 9268-0223 [employer]. Care instructions adapted under license by [employer] (which disclaims liability or warranty for this information). If you have questions about a medical condition or this instruction, always ask your healthcare professional. [place] [address] any warranty or liability for your use of this information. Weeks 30 to 32 of Your Pregnancy: Care Instructions  Overview     You've made it to the final months of your pregnancy! By now your baby is really starting to look like a baby, with hair and plump skin. As you enter the final weeks of pregnancy, the reality of having a baby may start to set in. This is a good time to set up a safe nursery and find quality  if needed. Doing this stuff ahead of time will allow you to focus on caring for and enjoying your new baby. You may also want to take a tour of your hospital's labor and delivery unit.  This will help you get a better idea of what to expect while you're in the hospital.  During these last months, be sure to take good care of yourself. Pay attention to what your body needs. If your doctor says it's okay for you to work, don't push yourself too hard. If you haven't already had the Tdap shot during this pregnancy, talk to your doctor about getting it. It will help protect your  against pertussis infection. Follow-up care is a key part of your treatment and safety. Be sure to make and go to all appointments, and call your doctor if you are having problems. It's also a good idea to know your test results and keep a list of the medicines you take. How can you care for yourself at home? Pay attention to your baby's movements  · You should feel your baby move several times every day. · Your baby now turns less, and kicks and jabs more. · Your baby sleeps 20 to 45 minutes at a time and is more active at certain times of day. · If your doctor wants you to count your baby's kicks:  ? Empty your bladder, and lie on your side or relax in a comfortable chair. ? Write down your start time. ? Pay attention only to your baby's movements. Count any movement except hiccups. ? After you have counted 10 movements, write down your stop time. ? Write down how many minutes it took for your baby to move 10 times. ? If an hour goes by and you have not recorded 10 movements, have something to eat or drink and then count for another hour. If you don't record at least 10 movements in the 2-hour period, call your doctor. Ease heartburn  · Eat small, frequent meals. · Do not eat chocolate, peppermint, or very spicy foods. Avoid drinks with caffeine, such as coffee, tea, and sodas. · Avoid bending over or lying down after meals. · Take a short walk after you eat. · If heartburn is a problem at night, do not eat for 2 hours before bedtime. · Take antacids like Mylanta, Maalox, Rolaids, or Tums. Do not take antacids that have sodium bicarbonate.   Care for varicose veins  · Varicose veins are blood vessels that stretch out with the extra blood during pregnancy. Your legs may ache or throb. Most varicose veins will go away after the birth. · Avoid standing for long periods of time. Sit with your legs crossed at the ankles, not the knees. · Sit with your feet propped up. · Avoid tight clothing or stockings. Wear support hose. · Exercise regularly. Try walking for at least 30 minutes a day. Where can you learn more? Go to http://www.gray.com/  Enter X471 in the search box to learn more about \"Weeks 30 to 32 of Your Pregnancy: Care Instructions. \"  Current as of: June 16, 2021               Content Version: 13.0  © 1403-8751 [employer]. Care instructions adapted under license by [employer] (which disclaims liability or warranty for this information). If you have questions about a medical condition or this instruction, always ask your healthcare professional. [place] [address] any warranty or liability for your use of this information.

## 2022-01-31 NOTE — PROGRESS NOTES
Discharge instruction given. Information/teaching printout given to patient. States understanding. All questions answered. Informed pt to return to hospital if she doesn't feel baby move, if she suspects her water breaks, or if vaginal bleeding occurs that is more than spotting, or she starts to experience painful regular contractions. Ambulate out to personal auto with significant other at side.

## 2022-01-31 NOTE — H&P
Chief Complaint   Patient presents with    Abdominal Pain       31 y.o. female ,0,10,2 at 30w3d  weeks gestation who requests evaluation for abdominal cramping. Says this has been going on, has been in multiple times co pelvic pain, constipation, vomiting, etc. Takes procardia, phenergan and vistaril. States she is taking \"too much tylenol. \" last procardia was at 830. No bleeding or lof. No new urinary sx. Stated then that her symptoms did start today and were significant enough to come in . Her pregnancy issues include: cerclage in December, hx of multiple elAB and spontAB.      Fetal movement has beennormal .    HISTORY:  OB History    Para Term  AB Living   12 1 1 0 10 2   SAB IAB Ectopic Molar Multiple Live Births   2 8     1 2      # Outcome Date GA Lbr Elvin/2nd Weight Sex Delivery Anes PTL Lv   12 Current            11 IAB            10 SAB 16 6w0d          9 IAB            8 SAB 14 7w0d          7 IAB            6 IAB 01/01/10 7w0d          5 IAB 09 7w0d          4 IAB 09 7w0d          3 IAB 08 7w0d          2A Term 07 38w0d  2.863 kg F Vag-Spont EPI Y BRUNO      Complications:  labor, Hyperemesis affecting pregnancy, antepartum   2B Term 07 38w0d  2.353 kg M Vag-Spont EPI Y BRUNO      Complications:  labor, Hyperemesis affecting pregnancy, antepartum   1 IAB 04 7w0d              Past Surgical History:   Procedure Laterality Date    HX DILATION AND CURETTAGE      x9       Past Medical History:   Diagnosis Date    Anemia     Chlamydia 2006    Diarrhea during pregnancy 2022    GERD (gastroesophageal reflux disease) 10/16/2019    Gonorrhea 2006    High cholesterol     as a child    Hyperemesis affecting pregnancy, antepartum 2021    Zofran pump orders faxed to [facility]- unable to reach pt 21 pt provided with [facility] contact information to get Zofran pump initiated [facility]: Has had Zofran pump for past 2 weeks; managed by [facility]- currently 6mg over 6hr; C/o hyperemesis since 6 weeks. C/o nausea all day with vomiting 3-4 times; sometimes bile colored. Uses phenergan suppositories at night.   Pt scared to eat bec    MDD (major depressive disorder), recurrent episode ([address] 10/16/2019    Polycystic disease, ovaries     Post concussion syndrome 10/16/2019    Post concussion syndrome 10/16/2019    Sickle cell trait syndrome ([address]     Vaginal discharge in pregnancy in second trimester 11/3/2021       Allergies   Allergen Reactions    Reglan [Metoclopramide] Other (comments)     Jittery         Family History   Problem Relation Age of Onset    Diabetes Father     Breast Problems Other     Cancer Paternal Grandfather         Prostate CA    Crohn's Disease Sister     Seizures Sister     Thyroid Disease Paternal Grandmother        Social History     Socioeconomic History    Marital status: SINGLE     Spouse name: Not on file    Number of children: Not on file    Years of education: Not on file    Highest education level: Not on file   Occupational History    Not on file   Tobacco Use    Smoking status: Former Smoker    Smokeless tobacco: Never Used   Vaping Use    Vaping Use: Former   Substance and Sexual Activity    Alcohol use: No    Drug use: No    Sexual activity: Yes     Partners: Male     Birth control/protection: None   Other Topics Concern     Service Not Asked    Blood Transfusions Not Asked    Caffeine Concern Not Asked    Occupational Exposure Not Asked    Hobby Hazards Not Asked    Sleep Concern Not Asked    Stress Concern Not Asked    Weight Concern Not Asked    Special Diet Not Asked    Back Care Not Asked    Exercise Not Asked    Bike Helmet Not Asked   [address] Not Asked    Self-Exams Not Asked   Social History Narrative    Not on file     Social Determinants of Health     Financial Resource Strain:     Difficulty of Paying Living Expenses: Not on file   Food Insecurity:     Worried About Running Out of Food in the Last Year: Not on file    Beckie of Food in the Last Year: Not on file   Transportation Needs:     Lack of Transportation (Medical): Not on file    Lack of Transportation (Non-Medical): Not on file   Physical Activity:     Days of Exercise per Week: Not on file    Minutes of Exercise per Session: Not on file   Stress:     Feeling of Stress : Not on file   Social Connections:     Frequency of Communication with Friends and Family: Not on file    Frequency of Social Gatherings with Friends and Family: Not on file    Attends Anabaptism Services: Not on file    Active Member of [address] [place] [address] [place] or Organizations: Not on file    Attends Club or Organization Meetings: Not on file    Marital Status: Not on file   Intimate Partner Violence:     Fear of Current or Ex-Partner: Not on file    Emotionally Abused: Not on file    Physically Abused: Not on file    Sexually Abused: Not on file   Housing Stability:     Unable to Pay for Housing in the Last Year: Not on file    Number of [place] in the Last Year: Not on file    Unstable Housing in the Last Year: Not on file       ROS:  Negative:   negative 10 point ROS except as noted in HPI    Positive:   per hpi    PHYSICAL EXAM:  Blood pressure 126/76, pulse (!) 116, temperature 98.5 °F (36.9 °C), resp. rate 20, height 5' 3\" (1.6 m), weight 81.6 kg (180 lb), last menstrual period 07/01/2021, currently breastfeeding. The patient appears well, alert, oriented x 3. Appropriate affect. Lungs are clear. Heart RRR, no murmurs. Abdomen soft, non-tender, no rebound/guarding, normoactive bs. Fundus soft and non tender  Skin warm, dry, no rashes  Ext no edema, DTR's normal  ffn obtained.  Pt denies anything in vagina for 24 hours  Cervix: Closed/Thick/High  No tension on cerclage    Fetal Heart Rate: Reactive  Baseline: 155 per minute  Variability: moderate  Accelerations: yes  Decelerations: none  Uterine contractions: none   I personally reviewed and interpreted the FHR tracing    No results found for this or any previous visit (from the past 24 hour(s)). Tests performed and reviwed:   UA: normal     I have personally reviewed the patient's history, prenatal record, and pertinent test results. vital sign trends, laboratory results, previous provider notes support my clinical impression. Assessment:  31 y.o. female at 30w3d  discomfort of late pregnancy  Reassuring fetal status    Plan:  Findings and test results were discussed. Discussed at length discomforts of late pregnancy and how to differentiate uterine contractions by palpations, how to time them, and when to come in to be assessed for  contractions. Preventative non pharm strategies were discussed.  If ffn negative d/c to home  Time spent with patient consistent with level of care    Signed By:  Blaine Finney MD     2022

## 2022-02-07 PROBLEM — R10.9 ABDOMINAL PAIN DURING PREGNANCY IN THIRD TRIMESTER: Status: RESOLVED | Noted: 2022-01-30 | Resolved: 2022-02-07

## 2022-02-07 PROBLEM — U07.1 COVID-19: Status: RESOLVED | Noted: 2022-01-13 | Resolved: 2022-02-07

## 2022-02-07 PROBLEM — O26.893 ABDOMINAL PAIN DURING PREGNANCY IN THIRD TRIMESTER: Status: RESOLVED | Noted: 2022-01-30 | Resolved: 2022-02-07

## 2022-02-08 ENCOUNTER — HOSPITAL ENCOUNTER (OUTPATIENT)
Dept: LAB | Age: 37
Discharge: HOME | End: 2022-02-08
Payer: COMMERCIAL

## 2022-02-08 PROBLEM — O26.613 CHOLESTASIS DURING PREGNANCY IN THIRD TRIMESTER: Status: ACTIVE | Noted: 2022-02-08

## 2022-02-08 PROBLEM — O60.03 PRETERM LABOR IN THIRD TRIMESTER: Status: ACTIVE | Noted: 2021-11-04

## 2022-02-08 PROBLEM — O98.513 COVID-19 AFFECTING PREGNANCY IN THIRD TRIMESTER: Status: ACTIVE | Noted: 2022-01-01

## 2022-02-08 PROBLEM — O09.93 HIGH-RISK PREGNANCY IN THIRD TRIMESTER: Status: ACTIVE | Noted: 2021-09-09

## 2022-02-08 PROBLEM — O26.893 ABDOMINAL PAIN DURING PREGNANCY IN THIRD TRIMESTER: Status: ACTIVE | Noted: 2021-11-04

## 2022-02-08 PROBLEM — O26.893 PREGNANCY HEADACHE IN THIRD TRIMESTER: Status: ACTIVE | Noted: 2021-10-26

## 2022-02-08 PROBLEM — O34.33 CERVICAL CERCLAGE SUTURE PRESENT IN THIRD TRIMESTER: Status: ACTIVE | Noted: 2022-02-08

## 2022-02-08 PROBLEM — O34.33 CERVICAL INSUFFICIENCY IN PREGNANCY, ANTEPARTUM, THIRD TRIMESTER: Status: ACTIVE | Noted: 2021-11-16

## 2022-02-08 PROBLEM — K83.1 CHOLESTASIS DURING PREGNANCY IN THIRD TRIMESTER: Status: ACTIVE | Noted: 2022-02-08

## 2022-02-08 PROBLEM — O09.213 CURRENT PREGNANCY WITH HISTORY OF PRE-TERM LABOR IN THIRD TRIMESTER: Status: ACTIVE | Noted: 2021-09-30

## 2022-02-08 LAB
ALBUMIN SERPL-MCNC: 2.6 G/DL (ref 3.5–5)
ALBUMIN/GLOB SERPL: 0.6 {RATIO} (ref 1.2–3.5)
ALP SERPL-CCNC: 261 U/L (ref 50–136)
ALT SERPL-CCNC: 19 U/L (ref 12–65)
ANION GAP SERPL CALC-SCNC: 7 MMOL/L (ref 7–16)
AST SERPL-CCNC: 17 U/L (ref 15–37)
BILIRUB SERPL-MCNC: 0.3 MG/DL (ref 0.2–1.1)
BUN SERPL-MCNC: 3 MG/DL (ref 6–23)
CALCIUM SERPL-MCNC: 8.8 MG/DL (ref 8.3–10.4)
CHLORIDE SERPL-SCNC: 106 MMOL/L (ref 98–107)
CO2 SERPL-SCNC: 23 MMOL/L (ref 21–32)
CREAT SERPL-MCNC: 0.44 MG/DL (ref 0.6–1)
GLOBULIN SER CALC-MCNC: 4.1 G/DL (ref 2.3–3.5)
GLUCOSE SERPL-MCNC: 84 MG/DL (ref 65–100)
POTASSIUM SERPL-SCNC: 3.8 MMOL/L (ref 3.5–5.1)
PROT SERPL-MCNC: 6.7 G/DL (ref 6.3–8.2)
SODIUM SERPL-SCNC: 136 MMOL/L (ref 136–145)

## 2022-02-08 PROCEDURE — 36415 COLL VENOUS BLD VENIPUNCTURE: CPT

## 2022-02-08 PROCEDURE — 82239 BILE ACIDS TOTAL: CPT

## 2022-02-08 PROCEDURE — 80053 COMPREHEN METABOLIC PANEL: CPT

## 2022-02-09 LAB — BILE AC SERPL-SCNC: 8.3 UMOL/L (ref 0–10)

## 2022-02-11 ENCOUNTER — HOSPITAL ENCOUNTER (OUTPATIENT)
Age: 37
Discharge: HOME | End: 2022-02-11
Attending: OBSTETRICS & GYNECOLOGY | Admitting: OBSTETRICS & GYNECOLOGY
Payer: COMMERCIAL

## 2022-02-11 VITALS
WEIGHT: 175 LBS | DIASTOLIC BLOOD PRESSURE: 75 MMHG | HEART RATE: 109 BPM | OXYGEN SATURATION: 98 % | RESPIRATION RATE: 16 BRPM | TEMPERATURE: 98.1 F | HEIGHT: 63 IN | SYSTOLIC BLOOD PRESSURE: 118 MMHG | BODY MASS INDEX: 31.01 KG/M2

## 2022-02-11 PROBLEM — O21.9 VOMITING AFFECTING PREGNANCY, ANTEPARTUM: Status: ACTIVE | Noted: 2022-02-11

## 2022-02-11 LAB
ALBUMIN SERPL-MCNC: 2.6 G/DL (ref 3.5–5)
ALBUMIN/GLOB SERPL: 0.7 {RATIO} (ref 1.2–3.5)
ALP SERPL-CCNC: 283 U/L (ref 50–130)
ALT SERPL-CCNC: 20 U/L (ref 12–65)
ANION GAP SERPL CALC-SCNC: 7 MMOL/L (ref 7–16)
AST SERPL-CCNC: 22 U/L (ref 15–37)
BASOPHILS # BLD: 0 K/UL (ref 0–0.2)
BASOPHILS NFR BLD: 0 % (ref 0–2)
BILIRUB SERPL-MCNC: 0.3 MG/DL (ref 0.2–1.1)
BUN SERPL-MCNC: 7 MG/DL (ref 6–23)
CALCIUM SERPL-MCNC: 9 MG/DL (ref 8.3–10.4)
CHLORIDE SERPL-SCNC: 106 MMOL/L (ref 98–107)
CO2 SERPL-SCNC: 24 MMOL/L (ref 21–32)
CREAT SERPL-MCNC: 0.45 MG/DL (ref 0.6–1)
DIFFERENTIAL METHOD BLD: ABNORMAL
EOSINOPHIL # BLD: 0.1 K/UL (ref 0–0.8)
EOSINOPHIL NFR BLD: 1 % (ref 0.5–7.8)
ERYTHROCYTE [DISTWIDTH] IN BLOOD BY AUTOMATED COUNT: 13 % (ref 11.9–14.6)
GLOBULIN SER CALC-MCNC: 4 G/DL (ref 2.3–3.5)
GLUCOSE SERPL-MCNC: 101 MG/DL (ref 65–100)
GLUCOSE, GLUUPC: NEGATIVE
HCT VFR BLD AUTO: 34.3 % (ref 35.8–46.3)
HGB BLD-MCNC: 11.5 G/DL (ref 11.7–15.4)
IMM GRANULOCYTES # BLD AUTO: 0.2 K/UL (ref 0–0.5)
IMM GRANULOCYTES NFR BLD AUTO: 2 % (ref 0–5)
KETONES UR-MCNC: NORMAL MG/DL
LYMPHOCYTES # BLD: 1.6 K/UL (ref 0.5–4.6)
LYMPHOCYTES NFR BLD: 17 % (ref 13–44)
MCH RBC QN AUTO: 30.8 PG (ref 26.1–32.9)
MCHC RBC AUTO-ENTMCNC: 33.5 G/DL (ref 31.4–35)
MCV RBC AUTO: 92 FL (ref 79.6–97.8)
MONOCYTES # BLD: 0.9 K/UL (ref 0.1–1.3)
MONOCYTES NFR BLD: 10 % (ref 4–12)
NEUTS SEG # BLD: 6.4 K/UL (ref 1.7–8.2)
NEUTS SEG NFR BLD: 70 % (ref 43–78)
NRBC # BLD: 0 K/UL (ref 0–0.2)
PLATELET # BLD AUTO: 283 K/UL (ref 150–450)
PMV BLD AUTO: 8.8 FL (ref 9.4–12.3)
POTASSIUM SERPL-SCNC: 3.8 MMOL/L (ref 3.5–5.1)
PROT SERPL-MCNC: 6.6 G/DL (ref 6.3–8.2)
PROT UR QL: NORMAL
RBC # BLD AUTO: 3.73 M/UL (ref 4.05–5.2)
SODIUM SERPL-SCNC: 137 MMOL/L (ref 136–145)
WBC # BLD AUTO: 9.1 K/UL (ref 4.3–11.1)

## 2022-02-11 PROCEDURE — 96375 TX/PRO/DX INJ NEW DRUG ADDON: CPT | Performed by: OBSTETRICS & GYNECOLOGY

## 2022-02-11 PROCEDURE — 74011250636 HC RX REV CODE- 250/636: Performed by: OBSTETRICS & GYNECOLOGY

## 2022-02-11 PROCEDURE — 59025 FETAL NON-STRESS TEST: CPT

## 2022-02-11 PROCEDURE — 81002 URINALYSIS NONAUTO W/O SCOPE: CPT | Performed by: OBSTETRICS & GYNECOLOGY

## 2022-02-11 PROCEDURE — 85025 COMPLETE CBC W/AUTO DIFF WBC: CPT

## 2022-02-11 PROCEDURE — 99284 EMERGENCY DEPT VISIT MOD MDM: CPT

## 2022-02-11 PROCEDURE — 96374 THER/PROPH/DIAG INJ IV PUSH: CPT | Performed by: OBSTETRICS & GYNECOLOGY

## 2022-02-11 PROCEDURE — 74011000250 HC RX REV CODE- 250: Performed by: OBSTETRICS & GYNECOLOGY

## 2022-02-11 PROCEDURE — 99284 EMERGENCY DEPT VISIT MOD MDM: CPT | Performed by: OBSTETRICS & GYNECOLOGY

## 2022-02-11 PROCEDURE — 80053 COMPREHEN METABOLIC PANEL: CPT

## 2022-02-11 PROCEDURE — 59025 FETAL NON-STRESS TEST: CPT | Performed by: OBSTETRICS & GYNECOLOGY

## 2022-02-11 RX ORDER — PROMETHAZINE HYDROCHLORIDE 25 MG/1
25 SUPPOSITORY RECTAL
Qty: 12 SUPPOSITORY | Refills: 1 | Status: SHIPPED | OUTPATIENT
Start: 2022-02-11 | End: 2022-03-07

## 2022-02-11 RX ORDER — DIPHENHYDRAMINE HYDROCHLORIDE 50 MG/ML
12.5 INJECTION, SOLUTION INTRAMUSCULAR; INTRAVENOUS ONCE
Status: COMPLETED | OUTPATIENT
Start: 2022-02-11 | End: 2022-02-11

## 2022-02-11 RX ORDER — ONDANSETRON 4 MG/1
4 TABLET, ORALLY DISINTEGRATING ORAL
Qty: 12 TABLET | Refills: 1 | Status: SHIPPED | OUTPATIENT
Start: 2022-02-11 | End: 2022-03-09

## 2022-02-11 RX ADMIN — DIPHENHYDRAMINE HYDROCHLORIDE 12.5 MG: 50 INJECTION, SOLUTION INTRAMUSCULAR; INTRAVENOUS at 13:57

## 2022-02-11 RX ADMIN — PROMETHAZINE HYDROCHLORIDE 12.5 MG: 25 INJECTION INTRAMUSCULAR; INTRAVENOUS at 13:54

## 2022-02-11 NOTE — PROGRESS NOTES
Dr. Norris Cantrell called and updated that pt is able to keep down food and water at this time and is prepared to be discharged. MD states she will print out anti-emetic prescription for patient and put in orders for her to be discharged to home.

## 2022-02-11 NOTE — PROGRESS NOTES
Dr. Francesca Nj called and updated on pt arrival to unit, chief c/o of persistent vomiting since yesterday at 1000 and being unable to keep food down; updated on FHT. Dr. Francesca Nj states she will be at bedside to see pt soon.

## 2022-02-11 NOTE — H&P
Chief Complaint   Patient presents with    Nausea    Vomiting During Pregnancy       31 y.o. female C50K15,4,7,6 at 32w1d  weeks gestation who requests evaluation for vomiting for 24 hours. hasnt kept anything down. Not urinating much. No reflux or fever. Was in drs office this morning and told them she thought the vomiting was stress related, then vomited once at home and came her. Also complains of itching but bile acids were normal. Has script for vistaril but didn't get it filled.      Her pregnancy issues include: cerclage, on procardia x1 60 bid, gerd, hyperemesis in early preg/was on zofran pump    Fetal movement has beennormal .    HISTORY:  OB History    Para Term  AB Living   12 1 1 0 10 2   SAB IAB Ectopic Molar Multiple Live Births   2 8 0 0 1 2      # Outcome Date GA Lbr Elvin/2nd Weight Sex Delivery Anes PTL Lv   12 Current            11 IAB            10 SAB 16 6w0d          9 IAB            8 SAB 14 7w0d          7 IAB            6 IAB 01/01/10 7w0d          5 IAB 09 7w0d          4 IAB 09 7w0d          3 IAB 08 7w0d          2A Term 07 38w0d  2.863 kg F Vag-Spont EPI Y BRUNO      Complications:  labor, Hyperemesis affecting pregnancy, antepartum   2B Term 07 38w0d  2.353 kg M Vag-Spont EPI Y BRUNO      Complications:  labor, Hyperemesis affecting pregnancy, antepartum   1 IAB 04 7w0d              Past Surgical History:   Procedure Laterality Date    HX DILATION AND CURETTAGE      x9       Past Medical History:   Diagnosis Date    Anemia     Chlamydia 2006    Cholestasis during pregnancy in third trimester 2022    COVID-19 2022    GERD (gastroesophageal reflux disease) 10/16/2019    Gonorrhea 2006    Hyperemesis affecting pregnancy, antepartum 2021    Zofran pump orders faxed to [facility]- unable to reach pt 21 pt provided with [facility] contact information to get Zofran pump initiated 9/30/2021 at [facility]: Has had Zofran pump for past 2 weeks; managed by [facility]- currently 6mg over 6hr; C/o hyperemesis since 6 weeks. C/o nausea all day with vomiting 3-4 times; sometimes bile colored. Uses phenergan suppositories at night.   Pt scared to eat bec    MDD (major depressive disorder), recurrent episode ([facility] [address] 10/16/2019    Polycystic disease, ovaries     Post concussion syndrome 10/16/2019    Sickle cell trait syndrome ([facility] [address]        Allergies   Allergen Reactions    Reglan [Metoclopramide] Other (comments)     Jittery         Family History   Problem Relation Age of Onset    Diabetes Father     Breast Problems Other     Cancer Paternal Grandfather         Prostate CA    Crohn's Disease Sister     Seizures Sister     Thyroid Disease Paternal Grandmother        Social History     Socioeconomic History    Marital status: SINGLE     Spouse name: Not on file    Number of children: Not on file    Years of education: Not on file    Highest education level: Not on file   Occupational History    Not on file   Tobacco Use    Smoking status: Former Smoker    Smokeless tobacco: Never Used   Vaping Use    Vaping Use: Former   Substance and Sexual Activity    Alcohol use: No    Drug use: No    Sexual activity: Yes     Partners: Male     Birth control/protection: None   Other Topics Concern     Service Not Asked    Blood Transfusions Not Asked    Caffeine Concern Not Asked    Occupational Exposure Not Asked    Hobby Hazards Not Asked    Sleep Concern Not Asked    Stress Concern Not Asked    Weight Concern Not Asked    Special Diet Not Asked    Back Care Not Asked    Exercise Not Asked    Bike Helmet Not Asked   [address] Not Asked    Self-Exams Not Asked   Social History Narrative    Not on file     Social Determinants of Health     Financial Resource Strain:     Difficulty of Paying Living Expenses: Not on file   Food Insecurity:     Worried About Running Out of Food in the Last Year: Not on file    Ran Out of Food in the Last Year: Not on file   Transportation Needs:     Lack of Transportation (Medical): Not on file    Lack of Transportation (Non-Medical): Not on file   Physical Activity:     Days of Exercise per Week: Not on file    Minutes of Exercise per Session: Not on file   Stress:     Feeling of Stress : Not on file   Social Connections:     Frequency of Communication with Friends and Family: Not on file    Frequency of Social Gatherings with Friends and Family: Not on file    Attends Anabaptism Services: Not on file    Active Member of [address] [place] [address] [employer] or Organizations: Not on file    Attends Club or Organization Meetings: Not on file    Marital Status: Not on file   Intimate Partner Violence:     Fear of Current or Ex-Partner: Not on file    Emotionally Abused: Not on file    Physically Abused: Not on file    Sexually Abused: Not on file   Housing Stability:     Unable to Pay for Housing in the Last Year: Not on file    Number of [place] in the Last Year: Not on file    Unstable Housing in the Last Year: Not on file       ROS:  Negative:   negative 10 point ROS except as noted in HPI    Positive:   per hpi    PHYSICAL EXAM:  Blood pressure 118/75, pulse (!) 109, temperature 98.1 °F (36.7 °C), resp. rate 16, height 5' 3\" (1.6 m), weight 79.4 kg (175 lb), last menstrual period 07/01/2021, SpO2 98 %, currently breastfeeding. The patient appears well, alert, oriented x 3. Appropriate affect. Lungs are clear. Heart RRR, no murmurs. Abdomen soft, non-tender, no rebound/guarding, normoactive bs.   Fundus soft and non tender  Skin warm, dry, no rashes  Ext no edema, DTR's normal    Cervix: deferred    Fetal Heart Rate: Reactive  Baseline: 155 per minute  Variability: moderate  Accelerations: yes  Decelerations: none  Uterine contractions: none   I personally reviewed and interpreted the FHR tracing    Recent Results (from the past 24 hour(s))   AMB POC OB URINE DIP    Collection Time: 02/11/22 10:39 AM   Result Value Ref Range    Glucose (UA POC) Negative Negative    Protein (UA POC) Negative Negative       Tests performed and reviwed:   UA: normal     I have personally reviewed the patient's history, prenatal record, and pertinent test results. vital sign trends, laboratory results, previous provider notes support my clinical impression. Assessment:  31 y.o. female at 32w1d  vomiting in pregnancy. Reassuring fetal status    Plan:  Get labs, administer nausea meds and benadryl for itching. Will try to orally hydrate.   Time spent with patient consistent with level of care    Signed By:  Yony Florence MD     February 11, 2022

## 2022-02-11 NOTE — PROGRESS NOTES
Discharge instructions reviewed, including N/V, pt verbalizes understanding, no questions at this time. Pt and RN sign discharge instructions. 1615 pt undelivered, not in labor, ambulates off unit to home. No s/s of distress noted.

## 2022-02-11 NOTE — DISCHARGE INSTRUCTIONS
Patient Education        Nausea and Vomiting: Care Instructions  Your Care Instructions     When you are nauseated, you may feel weak and sweaty and notice a lot of saliva in your mouth. Nausea often leads to vomiting. Most of the time you do not need to worry about nausea and vomiting, but they can be signs of other illnesses. Two common causes of nausea and vomiting are stomach flu and food poisoning. Nausea and vomiting from viral stomach flu will usually start to improve within 24 hours. Nausea and vomiting from food poisoning may last from 12 to 48 hours. The doctor has checked you carefully, but problems can develop later. If you notice any problems or new symptoms, get medical treatment right away. Follow-up care is a key part of your treatment and safety. Be sure to make and go to all appointments, and call your doctor if you are having problems. It's also a good idea to know your test results and keep a list of the medicines you take. How can you care for yourself at home? · To prevent dehydration, drink plenty of fluids. Choose water and other clear liquids until you feel better. If you have kidney, heart, or liver disease and have to limit fluids, talk with your doctor before you increase the amount of fluids you drink. · Rest in bed until you feel better. · When you are able to eat, try clear soups, mild foods, and liquids until all symptoms are gone for 12 to 48 hours. Other good choices include dry toast, crackers, cooked cereal, and gelatin dessert, such as Jell-O. When should you call for help? Call 911 anytime you think you may need emergency care. For example, call if:    · You passed out (lost consciousness). Call your doctor now or seek immediate medical care if:    · You have symptoms of dehydration, such as:  ? Dry eyes and a dry mouth. ? Passing only a little urine. ? Feeling thirstier than usual.     · You have new or worsening belly pain.     · You have a new or higher fever.   · You vomit blood or what looks like coffee grounds. Watch closely for changes in your health, and be sure to contact your doctor if:    · You have ongoing nausea and vomiting.     · Your vomiting is getting worse.     · Your vomiting lasts longer than 2 days.     · You are not getting better as expected. Where can you learn more? Go to http://www.ferrell.com/  Enter H591 in the search box to learn more about \"Nausea and Vomiting: Care Instructions. \"  Current as of: July 1, 2021               Content Version: 13.0  © 8294-2405 Healthwise, [employer]. Care instructions adapted under license by [employer] (which disclaims liability or warranty for this information). If you have questions about a medical condition or this instruction, always ask your healthcare professional. [address] any warranty or liability for your use of this information.

## 2022-02-11 NOTE — PROGRESS NOTES
Pt arrives at [facility] at this time for persistent nausea and vomiting since 2/10/22 at 1000. Pt states she has been unable to keep food down since that time. Pt came to [facility] from [facility]32 visit with Dr. Jourdan Gupta. VS obtained, EFM and TOCO applied.

## 2022-02-11 NOTE — PROGRESS NOTES
Dr. Mariano Kern called and MD alerted to results of CBC and CMP being posted, MD pulled up results in Epic while on phone. MD also updated on urine dip test results. MD orders to PO hydrate, have pt eat saltine crackers and see if she can keep them down. Once pt can keep crackers and water down, MD will discharge to home.

## 2022-03-07 ENCOUNTER — HOSPITAL ENCOUNTER (OUTPATIENT)
Age: 37
Discharge: HOME | End: 2022-03-07
Attending: OBSTETRICS & GYNECOLOGY | Admitting: OBSTETRICS & GYNECOLOGY
Payer: COMMERCIAL

## 2022-03-07 VITALS
TEMPERATURE: 98.3 F | RESPIRATION RATE: 18 BRPM | HEIGHT: 63 IN | HEART RATE: 117 BPM | SYSTOLIC BLOOD PRESSURE: 129 MMHG | DIASTOLIC BLOOD PRESSURE: 84 MMHG | BODY MASS INDEX: 31.89 KG/M2 | WEIGHT: 180 LBS

## 2022-03-07 PROBLEM — R10.2 PELVIC PRESSURE IN PREGNANCY, ANTEPARTUM, THIRD TRIMESTER: Status: ACTIVE | Noted: 2022-03-07

## 2022-03-07 PROBLEM — O26.893 PELVIC PRESSURE IN PREGNANCY, ANTEPARTUM, THIRD TRIMESTER: Status: ACTIVE | Noted: 2022-03-07

## 2022-03-07 PROCEDURE — 99283 EMERGENCY DEPT VISIT LOW MDM: CPT

## 2022-03-07 PROCEDURE — 59025 FETAL NON-STRESS TEST: CPT

## 2022-03-08 NOTE — DISCHARGE INSTRUCTIONS
Patient Education   Patient Education        Early Stage of Labor at Home: Care Instructions  Overview     If you came to the hospital while in early labor, your doctor may ask you to labor at home until your contractions are stronger. Many women stay at home during early labor. This is often the longest part of the birthing process. It may last up to 2 to 3 days. Contractions are mild to moderate and shorter (about 30 to 45 seconds). You can usually keep talking during them. Contractions may also be irregular, about 5 to 20 minutes apart. They may even stop for a while. It helps to stay as relaxed as you can during this time. You can spend some or all of your early labor at home or anywhere else you may be comfortable. If you live far from the hospital or birthing center, you may want to think about going somewhere nearby so you can get back to the hospital quickly. For some women, there may be benefits to staying home during early labor, such as avoiding medicines or procedures. As labor progresses, you'll shift from early labor to active labor. During this time, contractions get more intense. They occur more often, about every 2 to 3 minutes. They also last longer, about 50 to 70 seconds. You will feel them even when you change positions and walk or move around. It may be hard to tell if you are in active labor. If you aren't sure, call your doctor or midwife. As your labor progresses, check in with your doctor or midwife about when to come back to the hospital or birthing center. You may have special instructions if your water broke or you tested positive for group B strep. Follow-up care is a key part of your treatment and safety. Be sure to make and go to all appointments, and call your doctor if you are having problems. It's also a good idea to know your test results and keep a list of the medicines you take. How can you care for yourself at home? · Get support.  Having a support person with you from early labor until after childbirth can have a positive effect on childbirth. · Find distractions. During early labor, you can walk, play cards, watch TV, or listen to music to help take your mind off your contractions. · Ask your partner, labor , or  for a massage. Shoulder and low back massage during contractions may ease your pain. Strong massage of the back muscles (counterpressure) during contractions may help relieve the pain of back labor. Tell your labor  exactly where to push and how hard to push. · Use imagery. This means using your imagination to decrease your pain. For instance, to help manage pain, picture your contractions as waves rolling over you. Picture a peaceful place, such as a beach or mountain stream, to help you relax between contractions. · Change positions during labor. Walking, kneeling, or sitting on a big rubber ball (birth ball) are good options. · Use focused breathing techniques. Breathing in a rhythm can distract you from pain. · Take a warm shower or bath. Warm water may ease pain and stress. When should you call for help? Call 911  anytime you think you may need emergency care. For example, call if:    · You passed out (lost consciousness).     · You have a seizure.     · You have severe vaginal bleeding.     · You have severe pain in your belly or pelvis that doesn't get better between contractions.     · You have had fluid gushing or leaking from your vagina and you know or think the umbilical cord is bulging into your vagina. If this happens, immediately get down on your knees so your rear end (buttocks) is higher than your head. This will decrease the pressure on the cord until help arrives. Call your doctor now or seek immediate medical care if:    · You have new or worse signs of preeclampsia, such as:  ? Sudden swelling of your face, hands, or feet. ? New vision problems (such as dimness, blurring, or seeing spots).   ? A severe headache.     · You have any vaginal bleeding.     · You have belly pain or cramping.     · You have a fever.     · You have had regular contractions (with or without pain) for an hour. This means that you have 8 or more within 1 hour or 4 or more in 20 minutes after you change your position and drink fluids.     · You have a sudden release of fluid from your vagina.     · You have low back pain or pelvic pressure that does not go away.     · You notice that your baby has stopped moving or is moving much less than normal.   Watch closely for changes in your health, and be sure to contact your doctor if you have any problems. Where can you learn more? Go to http://www.gray.com/  Enter [id number] in the search box to learn more about \"Early Stage of Labor at Home: Care Instructions. \"  Current as of: June 16, 2021               Content Version: 13.0  © 1612-8520 [employer]. Care instructions adapted under license by [employer] (which disclaims liability or warranty for this information). If you have questions about a medical condition or this instruction, always ask your healthcare professional. [address] any warranty or liability for your use of this information. Counting Your Baby's Kicks: Care Instructions  Overview     Counting your baby's kicks is one way your doctor can tell that your baby is healthy. Most women--especially in a first pregnancy--feel their baby move for the first time between 16 and 22 weeks. The movement may feel like flutters rather than kicks. Your baby may move more at certain times of the day. When you are active, you may notice less kicking than when you are resting. At your prenatal visits, your doctor will ask whether the baby is active. In your last trimester, your doctor may ask you to count the number of times you feel your baby move. Follow-up care is a key part of your treatment and safety.  Be sure to make and go to all appointments, and call your doctor if you are having problems. It's also a good idea to know your test results and keep a list of the medicines you take. How do you count fetal kicks? · A common method of checking your baby's movement is to note the length of time it takes to count ten movements (such as kicks, flutters, or rolls). · Pick your baby's most active time of day to count. This may be any time from morning to evening. · If you don't feel 10 movements in an hour, have something to eat or drink and count for another hour. If you don't feel at least 10 movements in the 2-hour period, call your doctor. When should you call for help? Call your doctor now or seek immediate medical care if:    · You noticed that your baby has stopped moving or is moving much less than normal.   Watch closely for changes in your health, and be sure to contact your doctor if you have any problems. Where can you learn more? Go to http://www.gray.com/  Enter [id number] in the search box to learn more about \"Counting Your Baby's Kicks: Care Instructions. \"  Current as of: June 16, 2021               Content Version: 13.0  © 2219-7946 Healthwise, [employer]. Care instructions adapted under license by [employer] (which disclaims liability or warranty for this information). If you have questions about a medical condition or this instruction, always ask your healthcare professional. [place] [address] any warranty or liability for your use of this information.

## 2022-03-08 NOTE — PROGRESS NOTES
Patient discharged to home stable she is waiting in room  on her boyfriend to come pick her up. Patient given labor precautions and kick counts. Patient verbalized understanding of these. Questions encouraged and asked.

## 2022-03-08 NOTE — H&P
History & Physical    Name: Radha Lopez MRN: [id number]  SSN: [id number]    YOB: 1985  Age: 31 y.o. Sex: female        Subjective:     Estimated Date of Delivery: 22  OB History    Para Term  AB Living   12 1 1 0 10 2   SAB IAB Ectopic Molar Multiple Live Births   2 8 0 0 1 2      # Outcome Date GA Lbr Elvin/2nd Weight Sex Delivery Anes PTL Lv   12 Current            11 IAB            10 SAB 16 6w0d          9 IAB            8 SAB 14 7w0d          7 IAB            6 IAB 01/01/10 7w0d          5 IAB 09 7w0d          4 IAB 09 7w0d          3 IAB 08 7w0d          2A Term 07 38w0d  2.863 kg F Vag-Spont EPI Y BRUNO      Complications:  labor, Hyperemesis affecting pregnancy, antepartum   2B Term 07 38w0d  2.353 kg M Vag-Spont EPI Y BRUNO      Complications:  labor, Hyperemesis affecting pregnancy, antepartum   1 IAB 04 7w0d              Ms. Mortimer Calico presented at 35w4d for evaluation of pelvic pressure. Patient had her last babies 15 years ago. She reports hip discomfort as well as unbearable pelvic pressure. Patient denies any vaginal bleeding or leakage of fluid. Patient also denies any regular contractions. Patient reports drinking 1 pack of water a day. Patient also admits to anxiety. Prenatal course was complicated by cervical incompetence. Please see prenatal records for details.     Past Medical History:   Diagnosis Date    Anemia     Chlamydia 2006    Cholestasis during pregnancy in third trimester 2022    COVID-19 2022    GERD (gastroesophageal reflux disease) 10/16/2019    Gonorrhea 2006    Hyperemesis affecting pregnancy, antepartum 2021    Zofran pump orders faxed to [facility]- unable to reach pt 21 pt provided with [facility] contact information to get Zofran pump initiated 2021 at [facility]: Has had Zofran pump for past 2 weeks; managed by [facility]- currently 6mg over 6hr; C/o hyperemesis since 6 weeks. C/o nausea all day with vomiting 3-4 times; sometimes bile colored. Uses phenergan suppositories at night. Pt scared to eat bec    MDD (major depressive disorder), recurrent episode ([facility] [address] 10/16/2019    Polycystic disease, ovaries     Post concussion syndrome 10/16/2019    Sickle cell trait syndrome ([facility] [address]      Past Surgical History:   Procedure Laterality Date    HX DILATION AND CURETTAGE      x9     Social History     Occupational History    Not on file   Tobacco Use    Smoking status: Former Smoker    Smokeless tobacco: Never Used   Vaping Use    Vaping Use: Former   Substance and Sexual Activity    Alcohol use: No    Drug use: No    Sexual activity: Yes     Partners: Male     Birth control/protection: None     Family History   Problem Relation Age of Onset    Diabetes Father     Breast Problems Other     Cancer Paternal Grandfather         Prostate CA    Crohn's Disease Sister     Seizures Sister     Thyroid Disease Paternal Grandmother        Allergies   Allergen Reactions    Reglan [Metoclopramide] Other (comments)     Jittery       Prior to Admission medications    Medication Sig Start Date End Date Taking? Authorizing Provider   ondansetron (ZOFRAN ODT) 4 mg disintegrating tablet Take 1 Tablet by mouth every eight (8) hours as needed for Nausea or Vomiting for up to 12 days. 2/11/22 2/23/22  Fely Holt MD   promethazine (PHENERGAN) 25 mg suppository Insert 1 Suppository into rectum every six (6) hours as needed for Nausea for up to 24 doses. Patient not taking: Reported on 3/4/2022 2/11/22   Fely Holt MD   albuterol (PROVENTIL HFA, VENTOLIN HFA, PROAIR HFA) 90 mcg/actuation inhaler INHALE 2 PUFFS BY MOUTH EVERY 6 HOURS AS NEEDED FOR WHEEZING  Patient not taking: Reported on 3/4/2022 1/3/22   Giana Morales MD   acetaminophen (TYLENOL) 500 mg tablet Take 1 Tablet by mouth every six (6) hours as needed for Pain or Fever.   Patient not taking: Reported on 3/4/2022 1/1/22   Jazmine De Paz MD   hydrOXYzine pamoate (VISTARIL) 25 mg capsule Take 1 Capsule by mouth two (2) times daily as needed for Anxiety (uterine irritability). Patient not taking: Reported on 3/4/2022 12/30/21   Treva Hart MD   famotidine (PEPCID) 20 mg tablet Take 1 Tablet by mouth nightly. Patient not taking: Reported on 3/4/2022 12/4/21   Ariel Conner MD   NIFEdipine ER (PROCARDIA XL) 30 mg ER tablet Take 1 Tablet by mouth two (2) times a day. 21   Wayne Ibarra MD        Review of Systems: A comprehensive review of systems was negative except for that written in the HPI. Objective:     Vitals:  Vitals:    22 1804 22 180   BP: 129/84    Pulse: (!) 117    Resp: 18    Temp: 98.3 °F (36.8 °C)    Weight:  81.6 kg (180 lb)   Height:  5' 3\" (1.6 m)        Physical Exam:  Patient without distress, but tearful at times due to anxiety. Fundus: soft and non tender  Perineum: blood absent, amniotic fluid absent  Membranes:  Intact   Cervical exam deferred  Fetal Heart Rate: Reactive  Baseline: 150 per minute  Variability: moderate  Accelerations: yes  Uterine contractions: irregular, less than 1 in 10 with some irritability    Prenatal Labs:   Lab Results   Component Value Date/Time    Rubella, External immune 2021 12:00 AM    HBsAg, External negative 2021 12:00 AM    HIV, External NR 2021 12:00 AM    RPR, External NR 2021 12:00 AM    ABO,Rh O negative 2021 12:00 AM         Assessment/Plan:     Active Problems:    Pelvic pressure in pregnancy, antepartum, third trimester (3/7/2022)     Pressures and pains of pregnancy. No evidence of  labor. Patient counseled on comfort measures. Labor precautions reviewed. Increase hydration. Keep scheduled follow up with [facility] this Wednesday.      Plan: D/C home

## 2022-03-09 ENCOUNTER — HOSPITAL ENCOUNTER (OUTPATIENT)
Age: 37
Setting detail: OBSERVATION
Discharge: HOME | End: 2022-03-09
Attending: OBSTETRICS & GYNECOLOGY | Admitting: OBSTETRICS & GYNECOLOGY
Payer: COMMERCIAL

## 2022-03-09 VITALS — HEART RATE: 105 BPM | SYSTOLIC BLOOD PRESSURE: 115 MMHG | DIASTOLIC BLOOD PRESSURE: 64 MMHG

## 2022-03-09 DIAGNOSIS — U07.1 COVID-19 AFFECTING PREGNANCY IN THIRD TRIMESTER: ICD-10-CM

## 2022-03-09 DIAGNOSIS — O09.93 HIGH-RISK PREGNANCY IN THIRD TRIMESTER: ICD-10-CM

## 2022-03-09 DIAGNOSIS — O36.8390 ANTEPARTUM NON-REASSURING FETAL HEART RATE OR RHYTHM AFFECTING CARE OF MOTHER: ICD-10-CM

## 2022-03-09 DIAGNOSIS — Z67.91 RH NEGATIVE STATUS DURING PREGNANCY IN THIRD TRIMESTER: ICD-10-CM

## 2022-03-09 DIAGNOSIS — O26.893 RH NEGATIVE STATUS DURING PREGNANCY IN THIRD TRIMESTER: ICD-10-CM

## 2022-03-09 DIAGNOSIS — O98.513 COVID-19 AFFECTING PREGNANCY IN THIRD TRIMESTER: ICD-10-CM

## 2022-03-09 DIAGNOSIS — O34.33 CERVICAL CERCLAGE SUTURE PRESENT IN THIRD TRIMESTER: ICD-10-CM

## 2022-03-09 PROBLEM — O21.9 VOMITING AFFECTING PREGNANCY, ANTEPARTUM: Status: RESOLVED | Noted: 2022-02-11 | Resolved: 2022-03-09

## 2022-03-09 PROBLEM — R10.2 PELVIC PRESSURE IN PREGNANCY, ANTEPARTUM, THIRD TRIMESTER: Status: RESOLVED | Noted: 2022-03-07 | Resolved: 2022-03-09

## 2022-03-09 PROBLEM — O26.613 CHOLESTASIS DURING PREGNANCY IN THIRD TRIMESTER: Status: RESOLVED | Noted: 2022-02-08 | Resolved: 2022-03-09

## 2022-03-09 PROBLEM — R10.9 ABDOMINAL PAIN DURING PREGNANCY IN THIRD TRIMESTER: Status: RESOLVED | Noted: 2021-11-04 | Resolved: 2022-03-09

## 2022-03-09 PROBLEM — K83.1 CHOLESTASIS DURING PREGNANCY IN THIRD TRIMESTER: Status: RESOLVED | Noted: 2022-02-08 | Resolved: 2022-03-09

## 2022-03-09 LAB
ABO + RH BLD: NORMAL
BLOOD GROUP ANTIBODIES SERPL: NORMAL
ERYTHROCYTE [DISTWIDTH] IN BLOOD BY AUTOMATED COUNT: 13.2 % (ref 11.9–14.6)
HCT VFR BLD AUTO: 35.6 % (ref 35.8–46.3)
HGB BLD-MCNC: 11.8 G/DL (ref 11.7–15.4)
MCH RBC QN AUTO: 30.5 PG (ref 26.1–32.9)
MCHC RBC AUTO-ENTMCNC: 33.1 G/DL (ref 31.4–35)
MCV RBC AUTO: 92 FL (ref 79.6–97.8)
NRBC # BLD: 0 K/UL (ref 0–0.2)
PLATELET # BLD AUTO: 301 K/UL (ref 150–450)
PMV BLD AUTO: 8.8 FL (ref 9.4–12.3)
RBC # BLD AUTO: 3.87 M/UL (ref 4.05–5.2)
SPECIMEN EXP DATE BLD: NORMAL
WBC # BLD AUTO: 9.5 K/UL (ref 4.3–11.1)

## 2022-03-09 PROCEDURE — 85027 COMPLETE CBC AUTOMATED: CPT

## 2022-03-09 PROCEDURE — 74011250637 HC RX REV CODE- 250/637: Performed by: OBSTETRICS & GYNECOLOGY

## 2022-03-09 PROCEDURE — 74011250636 HC RX REV CODE- 250/636: Performed by: OBSTETRICS & GYNECOLOGY

## 2022-03-09 PROCEDURE — 86900 BLOOD TYPING SEROLOGIC ABO: CPT

## 2022-03-09 PROCEDURE — 99220 PR INITIAL OBSERVATION CARE/DAY 70 MINUTES: CPT | Performed by: OBSTETRICS & GYNECOLOGY

## 2022-03-09 PROCEDURE — 59025 FETAL NON-STRESS TEST: CPT

## 2022-03-09 PROCEDURE — G0378 HOSPITAL OBSERVATION PER HR: HCPCS

## 2022-03-09 RX ORDER — SODIUM CHLORIDE, SODIUM LACTATE, POTASSIUM CHLORIDE, CALCIUM CHLORIDE 600; 310; 30; 20 MG/100ML; MG/100ML; MG/100ML; MG/100ML
150 INJECTION, SOLUTION INTRAVENOUS CONTINUOUS
Status: DISCONTINUED | OUTPATIENT
Start: 2022-03-09 | End: 2022-03-09 | Stop reason: HOSPADM

## 2022-03-09 RX ORDER — PROMETHAZINE HYDROCHLORIDE 25 MG/1
25 TABLET ORAL
Qty: 30 TABLET | Refills: 1 | Status: ON HOLD | OUTPATIENT
Start: 2022-03-09 | End: 2022-03-28 | Stop reason: SDUPTHER

## 2022-03-09 RX ORDER — BUTORPHANOL TARTRATE 2 MG/ML
2 INJECTION INTRAMUSCULAR; INTRAVENOUS
Status: DISCONTINUED | OUTPATIENT
Start: 2022-03-09 | End: 2022-03-09

## 2022-03-09 RX ORDER — PROMETHAZINE HYDROCHLORIDE 25 MG/1
25 TABLET ORAL
Status: DISCONTINUED | OUTPATIENT
Start: 2022-03-09 | End: 2022-03-09 | Stop reason: HOSPADM

## 2022-03-09 RX ORDER — PROMETHAZINE HYDROCHLORIDE 25 MG/ML
25 INJECTION, SOLUTION INTRAMUSCULAR; INTRAVENOUS
Status: DISCONTINUED | OUTPATIENT
Start: 2022-03-09 | End: 2022-03-09

## 2022-03-09 RX ORDER — HYDROMORPHONE HYDROCHLORIDE 1 MG/ML
2 INJECTION, SOLUTION INTRAMUSCULAR; INTRAVENOUS; SUBCUTANEOUS ONCE
Status: DISCONTINUED | OUTPATIENT
Start: 2022-03-09 | End: 2022-03-09

## 2022-03-09 RX ORDER — ACETAMINOPHEN 500 MG
1000 TABLET ORAL
Status: DISCONTINUED | OUTPATIENT
Start: 2022-03-09 | End: 2022-03-09 | Stop reason: HOSPADM

## 2022-03-09 RX ADMIN — ACETAMINOPHEN 1000 MG: 500 TABLET, FILM COATED ORAL at 13:23

## 2022-03-09 RX ADMIN — SODIUM CHLORIDE, SODIUM LACTATE, POTASSIUM CHLORIDE, AND CALCIUM CHLORIDE 150 ML/HR: 600; 310; 30; 20 INJECTION, SOLUTION INTRAVENOUS at 13:18

## 2022-03-09 NOTE — H&P
History & Physical    Name: Charla Guajardo MRN: [id number]  SSN: [id number]    YOB: 1985  Age: 31 y.o. Sex: female      Subjective:     Reason for Admission:  Pregnancy and Abnormal Fetal Heart Pattern, Contractions and Hyperemesis Gravidarum    History of Present Illness: Ms. Diogenes Markham is a 31 y.o.  female with an estimated gestational age of 26w5d with Estimated Date of Delivery: 22. Patient seen at [facility] today with nonreassuring fetal testing, history of cerclage placement and habitual aborter. Pregnancy has been complicated by COVID in 2nd trimester as well. Patient denies abdominal pain  , chest pain, contractions, fever, headache , nausea and vomiting, right upper quadrant pain  , shortness of breath, swelling, vaginal bleeding  and vaginal leaking of fluid .     OB History    Para Term  AB Living   12 1 1 0 10 2   SAB IAB Ectopic Molar Multiple Live Births   2 8 0 0 1 2      # Outcome Date GA Lbr Elvin/2nd Weight Sex Delivery Anes PTL Lv   12 Current            11 IAB            10 SAB 16 6w0d          9 IAB            8 SAB 14 7w0d          7 IAB            6 IAB 01/01/10 7w0d          5 IAB 09 7w0d          4 IAB 09 7w0d          3 IAB 08 7w0d          2A Term 07 38w0d  6 lb 5 oz (2.863 kg) F Vag-Spont EPI Y BRUNO      Complications:  labor, Hyperemesis affecting pregnancy, antepartum   2B Term 07 38w0d  5 lb 3 oz (2.353 kg) M Vag-Spont EPI Y BRUNO      Complications:  labor, Hyperemesis affecting pregnancy, antepartum   1 IAB 04 7w0d            Past Medical History:   Diagnosis Date    Anemia     Chlamydia 2006    Cholestasis during pregnancy in third trimester 2022    COVID-19 2022    GERD (gastroesophageal reflux disease) 10/16/2019    Gonorrhea 2006    Hyperemesis affecting pregnancy, antepartum 2021    Zofran pump orders faxed to [facility]- unable to reach pt 21 pt provided with [facility] contact information to get Zofran pump initiated [facility]/30/2021 at [facility]: Has had Zofran pump for past 2 weeks; managed by [facility]- currently 6mg over 6hr; C/o hyperemesis since 6 weeks. C/o nausea all day with vomiting 3-4 times; sometimes bile colored. Uses phenergan suppositories at night. Pt scared to eat bec    MDD (major depressive disorder), recurrent episode ([facility] [address] 10/16/2019    Polycystic disease, ovaries     Post concussion syndrome 10/16/2019    Sickle cell trait syndrome ([facility] [address]      Past Surgical History:   Procedure Laterality Date    HX DILATION AND CURETTAGE      x9     Social History     Occupational History    Not on file   Tobacco Use    Smoking status: Former Smoker    Smokeless tobacco: Never Used   Vaping Use    Vaping Use: Former   Substance and Sexual Activity    Alcohol use: No    Drug use: No    Sexual activity: Yes     Partners: Male     Birth control/protection: None      Family History   Problem Relation Age of Onset    Diabetes Father     Breast Problems Other     Cancer Paternal Grandfather         Prostate CA    Crohn's Disease Sister     Seizures Sister     Thyroid Disease Paternal Grandmother        Allergies   Allergen Reactions    Reglan [Metoclopramide] Other (comments)     Jittery       Prior to Admission medications    Medication Sig Start Date End Date Taking? Authorizing Provider   NIFEdipine ER (PROCARDIA XL) 30 mg ER tablet Take 1 Tablet by mouth two (2) times a day. Patient not taking: Reported on 3/9/2022 11/18/21   Tyron Thapa MD        Review of Systems:  A comprehensive review of systems was negative except for that written in the History of Present Illness. Objective:     Vitals:    Vitals:    03/09/22 1234 03/09/22 1324 03/09/22 1326   BP: 125/63 115/64 115/64   Pulse:   (!) 105      No data recorded. BP  Min: 115/72  Max: 125/63     Physical Exam:  Patient without distress.   Heart: Regular rate and rhythm  Lung: clear to auscultation throughout lung fields, no wheezes, no rales, no rhonchi and normal respiratory effort  Back: costovertebral angle tenderness absent  Abdomen: soft, nontender  Fundus: soft and non tender  Lower Extremities:  - Edema 1+     Membranes:  Intact  Uterine Activity:  None  Fetal Heart Rate:  Reactive       Lab/Data Review:  Recent Results (from the past 24 hour(s))   CBC W/O DIFF    Collection Time: 03/09/22  1:26 PM   Result Value Ref Range    WBC 9.5 4.3 - 11.1 K/uL    RBC 3.87 (L) 4.05 - 5.2 M/uL    HGB 11.8 11.7 - 15.4 g/dL    HCT 35.6 (L) 35.8 - 46.3 %    MCV 92.0 79.6 - 97.8 FL    MCH 30.5 26.1 - 32.9 PG    MCHC 33.1 31.4 - 35.0 g/dL    RDW 13.2 11.9 - 14.6 %    PLATELET 144 803 - 978 K/uL    MPV 8.8 (L) 9.4 - 12.3 FL    ABSOLUTE NRBC 0.00 0.0 - 0.2 K/uL       Assessment and Plan: Active Problems:    Rh negative status during pregnancy (9/13/2021)      Overview: ABS 01/14/2022                  Rhogam- given on 01/14/2022            Cervical cerclage suture present in third trimester (2/8/2022)      Overview: 12/2/21 Cerclage by ED      Antepartum non-reassuring fetal heart rate or rhythm affecting care of mother (3/9/2022)       - Cervical Incompetence:  Dr. Rhianna Marquez to evaluate for possible cerclage removal today. - Reassuring fetal testing on arrival to L&D.

## 2022-03-09 NOTE — CONSULTS
[facility] Note  Patient sent from [facility] office with NR-NST and tachycardia to 170's. Here, reactive NST, no decels, 's. No contractions, no decels. Patient with vaginal pressure but no pain. I recommended waiting to 37 weeks to prevent fetal lung immaturity complications, she agrees. Can go home.

## 2022-03-09 NOTE — PROGRESS NOTES
1234-Pt to room 465 for EFM. Sent for observation per M.      1326-IV started and labs drawn. 23-Jan-2019 22:00

## 2022-03-09 NOTE — DISCHARGE INSTRUCTIONS
Patient Education        Pregnancy Precautions: Care Instructions  Your Care Instructions     There is no sure way to prevent labor before your due date ( labor) or to prevent most other pregnancy problems. But there are things you can do to increase your chances of a healthy pregnancy. Go to your appointments, follow your doctor's advice, and take good care of yourself. Eat well, and exercise (if your doctor agrees). And make sure to drink plenty of water. Follow-up care is a key part of your treatment and safety. Be sure to make and go to all appointments, and call your doctor if you are having problems. It's also a good idea to know your test results and keep a list of the medicines you take. How can you care for yourself at home? · Make sure you go to your prenatal appointments. At each visit, your doctor will check your blood pressure. Your doctor will also check to see if you have protein in your urine. High blood pressure and protein in urine are signs of preeclampsia. This condition can be dangerous for you and your baby. · Drink plenty of fluids. Dehydration can cause contractions. If you have kidney, heart, or liver disease and have to limit fluids, talk with your doctor before you increase the amount of fluids you drink. · Tell your doctor right away if you notice any symptoms of an infection, such as:  ? Burning when you urinate. ? A foul-smelling discharge from your vagina. ? Vaginal itching. ? Unexplained fever. ? Unusual pain or soreness in your uterus or lower belly. · Eat a balanced diet. Include plenty of foods that are high in calcium and iron. ? Foods high in calcium include milk, cheese, yogurt, almonds, and broccoli. ? Foods high in iron include red meat, shellfish, poultry, eggs, beans, raisins, whole-grain bread, and leafy green vegetables. · Do not smoke. If you need help quitting, talk to your doctor about stop-smoking programs and medicines.  These can increase your chances of quitting for good. · Do not drink alcohol or use marijuana or illegal drugs. · Follow your doctor's directions about activity. Your doctor will let you know how much, if any, exercise you can do. · Ask your doctor if you can have sex. If you are at risk for early labor, your doctor may ask you to not have sex. · Take care to prevent falls. During pregnancy, your joints are loose, and your balance is off. Sports such as bicycling, skiing, or in-line skating can increase your risk of falling. And don't ride horses or motorcycles, dive, water ski, scuba dive, or parachute jump while you are pregnant. · Avoid things that can make your body too hot and may be harmful to your baby, such as a hot tub or sauna. Or talk with your doctor before doing anything that raises your body temperature. Your doctor can tell you if it's safe. · Do not take any over-the-counter or herbal medicines or supplements without talking to your doctor or pharmacist first.  When should you call for help? Call 911  anytime you think you may need emergency care. For example, call if:    · You passed out (lost consciousness).     · You have a seizure.     · You have severe vaginal bleeding.     · You have severe pain in your belly or pelvis.     · You have had fluid gushing or leaking from your vagina and you know or think the umbilical cord is bulging into your vagina. If this happens, immediately get down on your knees so your rear end (buttocks) is higher than your head. This will decrease the pressure on the cord until help arrives. Call your doctor now or seek immediate medical care if:    · You have signs of preeclampsia, such as:  ? Sudden swelling of your face, hands, or feet. ? New vision problems (such as dimness, blurring, or seeing spots).   ? A severe headache.     · You have any vaginal bleeding.     · You have belly pain or cramping.     · You have a fever.     · You have had regular contractions (with or without pain) for an hour. This means that you have 8 or more within 1 hour or 4 or more in 20 minutes after you change your position and drink fluids.     · You have a sudden release of fluid from your vagina.     · You have low back pain or pelvic pressure that does not go away.     · You notice that your baby has stopped moving or is moving much less than normal.   Watch closely for changes in your health, and be sure to contact your doctor if you have any problems. Where can you learn more? Go to http://www.ferrell.com/  Enter Y951 in the search box to learn more about \"Pregnancy Precautions: Care Instructions. \"  Current as of: June 16, 2021               Content Version: 13.2  © 2006-2022 Healthwise, [employer]. Care instructions adapted under license by [employer] (which disclaims liability or warranty for this information). If you have questions about a medical condition or this instruction, always ask your healthcare professional. [address] any warranty or liability for your use of this information.

## 2022-03-16 ENCOUNTER — HOSPITAL ENCOUNTER (OUTPATIENT)
Age: 37
LOS: 1 days | Discharge: HOME | End: 2022-03-16
Attending: OBSTETRICS & GYNECOLOGY | Admitting: OBSTETRICS & GYNECOLOGY
Payer: COMMERCIAL

## 2022-03-16 DIAGNOSIS — O34.33 CERVICAL CERCLAGE SUTURE PRESENT, THIRD TRIMESTER: ICD-10-CM

## 2022-03-16 DIAGNOSIS — Z34.90 TERM PREGNANCY: ICD-10-CM

## 2022-03-16 PROCEDURE — 74011250636 HC RX REV CODE- 250/636: Performed by: OBSTETRICS & GYNECOLOGY

## 2022-03-16 PROCEDURE — 99356 PR PROLONGED SVC I/P OR OBS SETTING 1ST HOUR: CPT | Performed by: OBSTETRICS & GYNECOLOGY

## 2022-03-16 PROCEDURE — 74011000258 HC RX REV CODE- 258: Performed by: OBSTETRICS & GYNECOLOGY

## 2022-03-16 PROCEDURE — 59025 FETAL NON-STRESS TEST: CPT | Performed by: OBSTETRICS & GYNECOLOGY

## 2022-03-16 PROCEDURE — 99285 EMERGENCY DEPT VISIT HI MDM: CPT

## 2022-03-16 PROCEDURE — 96365 THER/PROPH/DIAG IV INF INIT: CPT

## 2022-03-16 PROCEDURE — 96374 THER/PROPH/DIAG INJ IV PUSH: CPT

## 2022-03-16 PROCEDURE — 99220 PR INITIAL OBSERVATION CARE/DAY 70 MINUTES: CPT | Performed by: OBSTETRICS & GYNECOLOGY

## 2022-03-16 PROCEDURE — 96375 TX/PRO/DX INJ NEW DRUG ADDON: CPT

## 2022-03-16 PROCEDURE — 59025 FETAL NON-STRESS TEST: CPT

## 2022-03-16 PROCEDURE — 96360 HYDRATION IV INFUSION INIT: CPT

## 2022-03-16 RX ORDER — BUTORPHANOL TARTRATE 2 MG/ML
2 INJECTION INTRAMUSCULAR; INTRAVENOUS
Status: COMPLETED | OUTPATIENT
Start: 2022-03-16 | End: 2022-03-16

## 2022-03-16 RX ADMIN — BUTORPHANOL TARTRATE 2 MG: 2 INJECTION, SOLUTION INTRAMUSCULAR; INTRAVENOUS at 12:59

## 2022-03-16 RX ADMIN — PROMETHAZINE HYDROCHLORIDE 25 MG: 25 INJECTION INTRAMUSCULAR; INTRAVENOUS at 13:00

## 2022-03-16 NOTE — PROGRESS NOTES
Pt has eaten and be up to void without difficulty. Contractions irregular and pt no longer c/o pain. Pt may discharge to home.

## 2022-03-16 NOTE — PROGRESS NOTES
1 Pt arrived for cerclage removal. Dr. Lesley Kahn at bedside. Consent obtained. Several attempts at IV access. Anesthesia called to place.

## 2022-03-16 NOTE — PROGRESS NOTES
IV acess obtained. Dr. Azul Dougherty at bedside. Medications per MAR. Cerclage removed without difficulty. SVE as documented.

## 2022-03-16 NOTE — DISCHARGE INSTRUCTIONS
Patient Education        Pregnancy Precautions: Care Instructions  Your Care Instructions     There is no sure way to prevent labor before your due date ( labor) or to prevent most other pregnancy problems. But there are things you can do to increase your chances of a healthy pregnancy. Go to your appointments, follow your doctor's advice, and take good care of yourself. Eat well, and exercise (if your doctor agrees). And make sure to drink plenty of water. Follow-up care is a key part of your treatment and safety. Be sure to make and go to all appointments, and call your doctor if you are having problems. It's also a good idea to know your test results and keep a list of the medicines you take. How can you care for yourself at home? · Make sure you go to your prenatal appointments. At each visit, your doctor will check your blood pressure. Your doctor will also check to see if you have protein in your urine. High blood pressure and protein in urine are signs of preeclampsia. This condition can be dangerous for you and your baby. · Drink plenty of fluids. Dehydration can cause contractions. If you have kidney, heart, or liver disease and have to limit fluids, talk with your doctor before you increase the amount of fluids you drink. · Tell your doctor right away if you notice any symptoms of an infection, such as:  ? Burning when you urinate. ? A foul-smelling discharge from your vagina. ? Vaginal itching. ? Unexplained fever. ? Unusual pain or soreness in your uterus or lower belly. · Eat a balanced diet. Include plenty of foods that are high in calcium and iron. ? Foods high in calcium include milk, cheese, yogurt, almonds, and broccoli. ? Foods high in iron include red meat, shellfish, poultry, eggs, beans, raisins, whole-grain bread, and leafy green vegetables. · Do not smoke. If you need help quitting, talk to your doctor about stop-smoking programs and medicines.  These can increase your chances of quitting for good. · Do not drink alcohol or use marijuana or illegal drugs. · Follow your doctor's directions about activity. Your doctor will let you know how much, if any, exercise you can do. · Ask your doctor if you can have sex. If you are at risk for early labor, your doctor may ask you to not have sex. · Take care to prevent falls. During pregnancy, your joints are loose, and your balance is off. Sports such as bicycling, skiing, or in-line skating can increase your risk of falling. And don't ride horses or motorcycles, dive, water ski, scuba dive, or parachute jump while you are pregnant. · Avoid things that can make your body too hot and may be harmful to your baby, such as a hot tub or sauna. Or talk with your doctor before doing anything that raises your body temperature. Your doctor can tell you if it's safe. · Do not take any over-the-counter or herbal medicines or supplements without talking to your doctor or pharmacist first.  When should you call for help? Call 911  anytime you think you may need emergency care. For example, call if:    · You passed out (lost consciousness).     · You have a seizure.     · You have severe vaginal bleeding.     · You have severe pain in your belly or pelvis.     · You have had fluid gushing or leaking from your vagina and you know or think the umbilical cord is bulging into your vagina. If this happens, immediately get down on your knees so your rear end (buttocks) is higher than your head. This will decrease the pressure on the cord until help arrives. Call your doctor now or seek immediate medical care if:    · You have signs of preeclampsia, such as:  ? Sudden swelling of your face, hands, or feet. ? New vision problems (such as dimness, blurring, or seeing spots).   ? A severe headache.     · You have any vaginal bleeding.     · You have belly pain or cramping.     · You have a fever.     · You have had regular contractions (with or without pain) for an hour. This means that you have 8 or more within 1 hour or 4 or more in 20 minutes after you change your position and drink fluids.     · You have a sudden release of fluid from your vagina.     · You have low back pain or pelvic pressure that does not go away.     · You notice that your baby has stopped moving or is moving much less than normal.   Watch closely for changes in your health, and be sure to contact your doctor if you have any problems. Where can you learn more? Go to http://www.ferrell.com/  Enter Y951 in the search box to learn more about \"Pregnancy Precautions: Care Instructions. \"  Current as of: June 16, 2021               Content Version: 13.2  © 2006-2022 Healthwise, [employer]. Care instructions adapted under license by [employer] (which disclaims liability or warranty for this information). If you have questions about a medical condition or this instruction, always ask your healthcare professional. [address] any warranty or liability for your use of this information.

## 2022-03-16 NOTE — PROGRESS NOTES
Self care, labor precautions and follow up appointments reviewed, signed and copies given to pt and sister. Discharge to home in stable condition.

## 2022-03-16 NOTE — H&P
[facility] Antepartum Progress Note    31 y.o. Judith Apt [id number] at 36w6d by Estimated Date of Delivery: 4/7/22. Patient admitted for term gestation with cerclage in place. She complains of pelvic discomfort with cerclage suture in place. No further fetal tachycardia since last [facility] visit. Patient denies HA, abdominal pain, vision changes. No regular contractions, LOF, VB. Good FM. Moderate non-dependent edema- worsened from last [facility] visit. No chest pain or shortness of breath. No significant reflux, nausea, constipation, or other GI complaints. ROS per HPI, otherwise unremarkable. Current Facility-Administered Medications:     promethazine (PHENERGAN) 25 mg in 0.9% sodium chloride 50 mL IVPB, 25 mg, IntraVENous, NOW, Carleen Gannon MD, Last Rate: 200 mL/hr at 03/16/22 1300, 25 mg at 03/16/22 1300    Vitals:  No data found. No data recorded. Exam:   Constitutional: Patient without distress. HEENT: Symmetric without facial palsy, uncorrected poor hearing or uncorrected poor vision. No thyroid enlargement or goiter  Chest: No use of accessory muscles or excessive work of breathing  Abdomen: gravid, soft, non-tender. Fundus: soft and non tender  Genitourinary: No sign of blood or amniotic fluid, external genitalia normal, vagina without evidence of vaginitis  Lower Extremities:  Edema: 2+ bilaterally  Skin: normal coloration and turgor, no rashes, no suspicious skin lesions noted. Neurologic: AOx3. Gait normal. Reflexes and motor strength normal and symmetric. Cranial nerves 2-12 and sensation grossly intact. Psychiatric: non focal, anxious    Maternal and Fetal Monitoring:                              Uterine Activity:                             Fetal Heart Rate:         NST:  · Indications:   Near Term with recent   · Time On: > 20 minutes  · Results:  Reactive   · FHR Baseline Rate:145  · Periodic Changes: Mod morenita, no decels. Numerous accels.    · Contractions- irritability  · Comments:  Reassuring maternal and fetal status.          Labs:  CBC:    Recent Labs     03/09/22  1326 02/11/22  1349 01/14/22  1017 01/13/22  1850 12/01/21  1348 11/16/21  1748 10/07/21  1357 09/30/21  1436 09/19/21  1830 09/13/21  1058 09/09/21  1123 09/09/21  0000 08/18/21  1939   WBC 9.5 9.1  --  10.9 8.0 11.4* 8.0 7.5 10.3 7.2 8.1  --  8.7   HGB 11.8 11.5* 10.7* 11.7 11.3* 12.3 12.6 11.6* 13.2 13.1 13.0  --  13.5   HCT 35.6* 34.3*  --  35.1* 32.3* 37.1 37.2 33.7* 38.7 37.3 38.8  --  39.0    283  --  281 258 264 300 282 300 264 270  --  274   HGBEXT  --   --   --   --   --   --   --   --   --   --   --  13.0  --    HCTEXT  --   --   --   --   --   --   --   --   --   --   --  38.8  --    PLTEXT  --   --   --   --   --   --   --   --   --   --   --  270  --      CMP:   Recent Labs     02/11/22  1349 02/08/22  1500 01/19/22  0806 01/13/22  1850 11/18/21  0554 10/07/21  1357 09/30/21  1436 09/29/21  1444 09/19/21  1831 09/19/21  1830 09/13/21  1058 08/18/21 1939    136  --  137 137 136 134* 137 138  --  137 138   K 3.8 3.8  --  3.9 3.8 4.1 3.8 4.0 3.5  --  4.0 3.7    106  --  107 107 107 107 102 105  --  106 107   CO2 24 23  --  24 23 23 22 20 26  --  24 27   AGAP 7 7  --  6* 7 6* 5*  --  7  --  7 4*   * 84 92 87 104* 87 122* 80 86  --  98 91   BUN 7 3*  --  8 6 4* 5* 5* 6  --  6 7   CREA 0.45* 0.44*  --  0.50* 0.45* 0.59* 0.52* 0.62 0.58*  --  0.48* 0.62   GFRAA >60 >60  --  >60 >60 >60 >60 136 >60  --  >60 >60   GFRNA >60 >60  --  >60 >60 >60 >60 118 >60  --  >60 >60   CA 9.0 8.8  --  9.3 8.8 9.0 8.5 9.3 9.8  --  9.0 9.2   MG  --   --   --   --   --   --  1.9  --   --  1.8  --   --    ALB 2.6* 2.6*  --  2.9* 2.4* 3.0* 2.7* 3.9 3.2*  --  3.2*  --    TP 6.6 6.7  --  6.7 5.4* 7.0 6.4 6.5 7.0  --  7.0  --    GLOB 4.0* 4.1*  --  3.8* 3.0 4.0* 3.7*  --  3.8*  --  3.8*  --    AGRAT 0.7* 0.6*  --  0.8* 0.8* 0.8* 0.7* 1.5 0.8*  --  0.8*  --    ALT 20 19  --  19 39 17 14 9 15  --  28  --      Recent Glucose Results: Recent Labs     22  1349 22  1500 22  0806 22  1850 21  0554 10/07/21  1357 21  1436 21  1444 21  1831 21  1058 21  1939   * 84 92 87 104* 87 122* 80 86 98 91       A/P: 31 y.o.  at 36w6d      Active Problems:    Cervical cerclage suture present, third trimester (3/16/2022)      Term pregnancy (3/16/2022)    Risks, benefits, and alternatives to  ob procedure consent: cervical cerclage removal discussed. All questions were answered. Informed consent for ob procedure consent: cerclage removal obtained. Patient desires to proceed with procedure. Sister present today. Cerclage Removal Note  SSE exam done, Cervix and cerclage identified. Cerclage grasped, base of knot identified, one side of Mersiline cut and cerclage removed. Minimal bleeding occurred, resolved with direct pressure. Post-procedure CE: 1cm/thick/-3, mod texture, not labored feeling. Time:120    Minutes spent on floor,with greater than 50% of the time examining patient, explaining plan and coordinating care with nurse and requesting primary physician.

## 2022-03-18 PROBLEM — O26.20 ABORTER, HABITUAL, ANTEPARTUM: Status: ACTIVE | Noted: 2021-09-09

## 2022-03-18 PROBLEM — Z23 ENCOUNTER FOR IMMUNIZATION: Status: ACTIVE | Noted: 2021-09-09

## 2022-03-18 PROBLEM — O34.33 CERVICAL INSUFFICIENCY IN PREGNANCY, ANTEPARTUM, THIRD TRIMESTER: Status: ACTIVE | Noted: 2021-11-16

## 2022-03-18 PROBLEM — O34.29 HX OF UTERINE SURGERY AFFECTING CURRENT PREGNANCY: Status: ACTIVE | Noted: 2021-09-09

## 2022-03-18 PROBLEM — O34.33 CERVICAL CERCLAGE SUTURE PRESENT IN THIRD TRIMESTER: Status: ACTIVE | Noted: 2022-02-08

## 2022-03-18 PROBLEM — O99.619 GASTROESOPHAGEAL REFLUX IN PREGNANCY: Status: ACTIVE | Noted: 2019-10-16

## 2022-03-18 PROBLEM — K21.9 GASTROESOPHAGEAL REFLUX IN PREGNANCY: Status: ACTIVE | Noted: 2019-10-16

## 2022-03-18 PROBLEM — O09.213 CURRENT PREGNANCY WITH HISTORY OF PRE-TERM LABOR IN THIRD TRIMESTER: Status: ACTIVE | Noted: 2021-09-30

## 2022-03-18 PROBLEM — M54.9 BACK PAIN AFFECTING PREGNANCY IN THIRD TRIMESTER: Status: ACTIVE | Noted: 2021-10-26

## 2022-03-18 PROBLEM — O99.891 BACK PAIN AFFECTING PREGNANCY IN THIRD TRIMESTER: Status: ACTIVE | Noted: 2021-10-26

## 2022-03-19 PROBLEM — O26.893 PREGNANCY HEADACHE IN THIRD TRIMESTER: Status: ACTIVE | Noted: 2021-10-26

## 2022-03-19 PROBLEM — O99.340 DEPRESSION AFFECTING PREGNANCY: Status: ACTIVE | Noted: 2019-10-16

## 2022-03-19 PROBLEM — O36.8390 ANTEPARTUM NON-REASSURING FETAL HEART RATE OR RHYTHM AFFECTING CARE OF MOTHER: Status: ACTIVE | Noted: 2022-03-09

## 2022-03-19 PROBLEM — O26.899 RH NEGATIVE STATUS DURING PREGNANCY: Status: ACTIVE | Noted: 2021-09-13

## 2022-03-19 PROBLEM — O98.513 COVID-19 AFFECTING PREGNANCY IN THIRD TRIMESTER: Status: ACTIVE | Noted: 2022-01-01

## 2022-03-19 PROBLEM — F32.A DEPRESSION AFFECTING PREGNANCY: Status: ACTIVE | Noted: 2019-10-16

## 2022-03-19 PROBLEM — U07.1 COVID-19 AFFECTING PREGNANCY IN THIRD TRIMESTER: Status: ACTIVE | Noted: 2022-01-01

## 2022-03-19 PROBLEM — O09.93 HIGH-RISK PREGNANCY IN THIRD TRIMESTER: Status: ACTIVE | Noted: 2021-09-09

## 2022-03-19 PROBLEM — Z67.91 RH NEGATIVE STATUS DURING PREGNANCY: Status: ACTIVE | Noted: 2021-09-13

## 2022-03-19 PROBLEM — R51.9 PREGNANCY HEADACHE IN THIRD TRIMESTER: Status: ACTIVE | Noted: 2021-10-26

## 2022-03-19 PROBLEM — O60.03 PRETERM LABOR IN THIRD TRIMESTER: Status: ACTIVE | Noted: 2021-11-04

## 2022-03-23 ENCOUNTER — HOSPITAL ENCOUNTER (OUTPATIENT)
Age: 37
Discharge: HOME | End: 2022-03-23
Attending: OBSTETRICS & GYNECOLOGY | Admitting: OBSTETRICS & GYNECOLOGY
Payer: COMMERCIAL

## 2022-03-23 VITALS
BODY MASS INDEX: 32.6 KG/M2 | HEIGHT: 63 IN | TEMPERATURE: 98.4 F | DIASTOLIC BLOOD PRESSURE: 77 MMHG | WEIGHT: 184 LBS | SYSTOLIC BLOOD PRESSURE: 123 MMHG | OXYGEN SATURATION: 96 % | RESPIRATION RATE: 20 BRPM | HEART RATE: 100 BPM

## 2022-03-23 PROBLEM — O26.893 ABDOMINAL PAIN DURING PREGNANCY IN THIRD TRIMESTER: Status: ACTIVE | Noted: 2022-03-23

## 2022-03-23 PROBLEM — R10.9 ABDOMINAL PAIN DURING PREGNANCY IN THIRD TRIMESTER: Status: ACTIVE | Noted: 2022-03-23

## 2022-03-23 PROCEDURE — 99282 EMERGENCY DEPT VISIT SF MDM: CPT

## 2022-03-23 NOTE — DISCHARGE INSTRUCTIONS
Patient Education   Patient Education        Pregnancy Precautions: Care Instructions  Your Care Instructions     There is no sure way to prevent labor before your due date ( labor) or to prevent most other pregnancy problems. But there are things you can do to increase your chances of a healthy pregnancy. Go to your appointments, follow your doctor's advice, and take good care of yourself. Eat well, and exercise (if your doctor agrees). And make sure to drink plenty of water. Follow-up care is a key part of your treatment and safety. Be sure to make and go to all appointments, and call your doctor if you are having problems. It's also a good idea to know your test results and keep a list of the medicines you take. How can you care for yourself at home? · Make sure you go to your prenatal appointments. At each visit, your doctor will check your blood pressure. Your doctor will also check to see if you have protein in your urine. High blood pressure and protein in urine are signs of preeclampsia. This condition can be dangerous for you and your baby. · Drink plenty of fluids. Dehydration can cause contractions. If you have kidney, heart, or liver disease and have to limit fluids, talk with your doctor before you increase the amount of fluids you drink. · Tell your doctor right away if you notice any symptoms of an infection, such as:  ? Burning when you urinate. ? A foul-smelling discharge from your vagina. ? Vaginal itching. ? Unexplained fever. ? Unusual pain or soreness in your uterus or lower belly. · Eat a balanced diet. Include plenty of foods that are high in calcium and iron. ? Foods high in calcium include milk, cheese, yogurt, almonds, and broccoli. ? Foods high in iron include red meat, shellfish, poultry, eggs, beans, raisins, whole-grain bread, and leafy green vegetables. · Do not smoke. If you need help quitting, talk to your doctor about stop-smoking programs and medicines.  These can increase your chances of quitting for good. · Do not drink alcohol or use marijuana or illegal drugs. · Follow your doctor's directions about activity. Your doctor will let you know how much, if any, exercise you can do. · Ask your doctor if you can have sex. If you are at risk for early labor, your doctor may ask you to not have sex. · Take care to prevent falls. During pregnancy, your joints are loose, and your balance is off. Sports such as bicycling, skiing, or in-line skating can increase your risk of falling. And don't ride horses or motorcycles, dive, water ski, scuba dive, or parachute jump while you are pregnant. · Avoid things that can make your body too hot and may be harmful to your baby, such as a hot tub or sauna. Or talk with your doctor before doing anything that raises your body temperature. Your doctor can tell you if it's safe. · Do not take any over-the-counter or herbal medicines or supplements without talking to your doctor or pharmacist first.  When should you call for help? Call 911  anytime you think you may need emergency care. For example, call if:    · You passed out (lost consciousness).     · You have a seizure.     · You have severe vaginal bleeding.     · You have severe pain in your belly or pelvis.     · You have had fluid gushing or leaking from your vagina and you know or think the umbilical cord is bulging into your vagina. If this happens, immediately get down on your knees so your rear end (buttocks) is higher than your head. This will decrease the pressure on the cord until help arrives. Call your doctor now or seek immediate medical care if:    · You have signs of preeclampsia, such as:  ? Sudden swelling of your face, hands, or feet. ? New vision problems (such as dimness, blurring, or seeing spots).   ? A severe headache.     · You have any vaginal bleeding.     · You have belly pain or cramping.     · You have a fever.     · You have had regular contractions (with or without pain) for an hour. This means that you have 8 or more within 1 hour or 4 or more in 20 minutes after you change your position and drink fluids.     · You have a sudden release of fluid from your vagina.     · You have low back pain or pelvic pressure that does not go away.     · You notice that your baby has stopped moving or is moving much less than normal.   Watch closely for changes in your health, and be sure to contact your doctor if you have any problems. Where can you learn more? Go to http://www.ferrell.com/  Enter Y951 in the search box to learn more about \"Pregnancy Precautions: Care Instructions. \"  Current as of: June 16, 2021               Content Version: 13.2  © 2006-2022 [employer]. Care instructions adapted under license by [employer] (which disclaims liability or warranty for this information). If you have questions about a medical condition or this instruction, always ask your healthcare professional. [place] [address] any warranty or liability for your use of this information. Counting Your Baby's Kicks: Care Instructions  Overview     Counting your baby's kicks is one way your doctor can tell that your baby is healthy. Most women--especially in a first pregnancy--feel their baby move for the first time between 16 and 22 weeks. The movement may feel like flutters rather than kicks. Your baby may move more at certain times of the day. When you are active, you may notice less kicking than when you are resting. At your prenatal visits, your doctor will ask whether the baby is active. In your last trimester, your doctor may ask you to count the number of times you feel your baby move. Follow-up care is a key part of your treatment and safety. Be sure to make and go to all appointments, and call your doctor if you are having problems.  It's also a good idea to know your test results and keep a list of the medicines you take. How do you count fetal kicks? · A common method of checking your baby's movement is to note the length of time it takes to count ten movements (such as kicks, flutters, or rolls). · Pick your baby's most active time of day to count. This may be any time from morning to evening. · If you don't feel 10 movements in an hour, have something to eat or drink and count for another hour. If you don't feel at least 10 movements in the 2-hour period, call your doctor. When should you call for help? Call your doctor now or seek immediate medical care if:    · You noticed that your baby has stopped moving or is moving much less than normal.   Watch closely for changes in your health, and be sure to contact your doctor if you have any problems. Where can you learn more? Go to http://www.gray.com/  Enter [id number] in the search box to learn more about \"Counting Your Baby's Kicks: Care Instructions. \"  Current as of: June 16, 2021               Content Version: 13.2  © 6284-1068 [employer]. Care instructions adapted under license by [employer] (which disclaims liability or warranty for this information). If you have questions about a medical condition or this instruction, always ask your healthcare professional. [address] any warranty or liability for your use of this information.

## 2022-03-23 NOTE — PROGRESS NOTES
Discharge instructions reviewed. Pt verbalizes understanding. Pt discharged home in stable condition.

## 2022-03-23 NOTE — PROGRESS NOTES
Pt presented to [facility] complain of abdominal cramping. EFM on. Dr Cj Rodriguez notified. 1620- Dr Cj Rodriguez at bedside. Strip reviewed. AKSHAT ZAPATA.

## 2022-03-23 NOTE — H&P
History & Physical    Name: Jonatan Freitas MRN: [id number]  SSN: [id number]    YOB: 1985  Age: 31 y.o. Sex: female        Subjective:     Estimated Date of Delivery: 22  OB History    Para Term  AB Living   12 1 1 0 10 2   SAB IAB Ectopic Molar Multiple Live Births   2 8 0 0 1 2      # Outcome Date GA Lbr Elvin/2nd Weight Sex Delivery Anes PTL Lv   12 Current            11 IAB            10 SAB 16 6w0d          9 IAB            8 SAB 14 7w0d          7 IAB            6 IAB 01/01/10 7w0d          5 IAB 09 7w0d          4 IAB 09 7w0d          3 IAB 08 7w0d          2A Term 07 38w0d  2.863 kg F Vag-Spont EPI Y BRUNO      Complications:  labor, Hyperemesis affecting pregnancy, antepartum   2B Term 07 38w0d  2.353 kg M Vag-Spont EPI Y BRUNO      Complications:  labor, Hyperemesis affecting pregnancy, antepartum   1 IAB 04 7w0d              Ms. Anny Christina is a [id number] at 37w6d presenting with reports of cramping and fatigue. Patient denies any vaginal bleeding or leakage of fluid. Patient states that she had a burst of energy after having her cerclage removed. She now feels tired but has a hard time sleeping. She reports hot flashes and reduced appetite. Prenatal course was complicated by cervical incompetence. Please see prenatal records for details. Past Medical History:   Diagnosis Date    Anemia     Chlamydia 2006    Cholestasis during pregnancy in third trimester 2022    COVID-19 2022    GERD (gastroesophageal reflux disease) 10/16/2019    Gonorrhea 2006    Hyperemesis affecting pregnancy, antepartum 2021    Zofran pump orders faxed to [facility]- unable to reach pt 21 pt provided with [facility] contact information to get Zofran pump initiated 2021 at [facility]: Has had Zofran pump for past 2 weeks; managed by [employer]- currently 6mg over 6hr; C/o hyperemesis since 6 weeks.   C/o nausea all day with vomiting 3-4 times; sometimes bile colored. Uses phenergan suppositories at night. Pt scared to eat bec    MDD (major depressive disorder), recurrent episode ([facility] [address] 10/16/2019    Polycystic disease, ovaries     Post concussion syndrome 10/16/2019    Sickle cell trait syndrome ([facility] [address]      Past Surgical History:   Procedure Laterality Date    HX DILATION AND CURETTAGE      x9     Social History     Occupational History    Not on file   Tobacco Use    Smoking status: Former Smoker    Smokeless tobacco: Never Used   Vaping Use    Vaping Use: Former   Substance and Sexual Activity    Alcohol use: No    Drug use: No    Sexual activity: Yes     Partners: Male     Birth control/protection: None     Family History   Problem Relation Age of Onset    Diabetes Father     Breast Problems Other     Cancer Paternal Grandfather         Prostate CA    Crohn's Disease Sister     Seizures Sister     Thyroid Disease Paternal Grandmother        Allergies   Allergen Reactions    Reglan [Metoclopramide] Other (comments)     Jittery       Prior to Admission medications    Medication Sig Start Date End Date Taking? Authorizing Provider   promethazine (PHENERGAN) 25 mg tablet Take 1 Tablet by mouth every six (6) hours as needed for Nausea for up to 30 doses. Indications: excessive vomiting in pregnancy  Patient not taking: Reported on 3/23/2022 3/9/22   Kathleen Rios MD        Review of Systems: A comprehensive review of systems was negative except for that written in the HPI. Objective:     Vitals:  Vitals:    03/23/22 1615 03/23/22 1616 03/23/22 1620 03/23/22 1621   BP:    123/77   Pulse:    100   Resp:       Temp:       SpO2: 97%  96%    Weight:  83.5 kg (184 lb)     Height:  5' 3\" (1.6 m)          Physical Exam:  Patient without distress.   Fundus: soft and non tender  Cervical Exam: FT/Thick/High  Membranes:  Intact  Fetal Heart Rate: Reactive  Baseline: 150 per minute  Variability: moderate  Accelerations: yes  Uterine contractions: none, uterine irritability    Prenatal Labs:   Lab Results   Component Value Date/Time    ABO/Rh(D) O NEGATIVE 03/09/2022 01:26 PM    Rubella, External immune 09/09/2021 12:00 AM    HBsAg, External negative 09/09/2021 12:00 AM    HIV, External NR 09/09/2021 12:00 AM    RPR, External NR 09/09/2021 12:00 AM    ABO,Rh O negative 09/09/2021 12:00 AM         Assessment/Plan:     Active Problems:    Abdominal pain during pregnancy in third trimester (3/23/2022)    No evidence of labor. Recommend rest and hydration. Patient reassured.  D/C home     Plan:

## 2022-03-24 PROBLEM — O34.33 CERVICAL CERCLAGE SUTURE PRESENT, THIRD TRIMESTER: Status: ACTIVE | Noted: 2022-03-16

## 2022-03-24 PROBLEM — O26.893 ABDOMINAL PAIN DURING PREGNANCY IN THIRD TRIMESTER: Status: ACTIVE | Noted: 2022-03-23

## 2022-03-24 PROBLEM — R10.9 ABDOMINAL PAIN DURING PREGNANCY IN THIRD TRIMESTER: Status: ACTIVE | Noted: 2022-03-23

## 2022-03-24 PROBLEM — Z34.90 TERM PREGNANCY: Status: ACTIVE | Noted: 2022-03-16

## 2022-03-28 ENCOUNTER — HOSPITAL ENCOUNTER (OUTPATIENT)
Age: 37
Discharge: HOME | End: 2022-03-29
Attending: OBSTETRICS & GYNECOLOGY | Admitting: OBSTETRICS & GYNECOLOGY
Payer: COMMERCIAL

## 2022-03-28 VITALS
RESPIRATION RATE: 22 BRPM | OXYGEN SATURATION: 98 % | DIASTOLIC BLOOD PRESSURE: 77 MMHG | TEMPERATURE: 98 F | SYSTOLIC BLOOD PRESSURE: 123 MMHG | HEART RATE: 114 BPM

## 2022-03-28 LAB
BASOPHILS # BLD: 0 K/UL (ref 0–0.2)
BASOPHILS NFR BLD: 0 % (ref 0–2)
DIFFERENTIAL METHOD BLD: ABNORMAL
EOSINOPHIL # BLD: 0.1 K/UL (ref 0–0.8)
EOSINOPHIL NFR BLD: 1 % (ref 0.5–7.8)
ERYTHROCYTE [DISTWIDTH] IN BLOOD BY AUTOMATED COUNT: 13.5 % (ref 11.9–14.6)
HCT VFR BLD AUTO: 32.5 % (ref 35.8–46.3)
HGB BLD-MCNC: 10.9 G/DL (ref 11.7–15.4)
IMM GRANULOCYTES # BLD AUTO: 0.2 K/UL (ref 0–0.5)
IMM GRANULOCYTES NFR BLD AUTO: 2 % (ref 0–5)
LYMPHOCYTES # BLD: 2.1 K/UL (ref 0.5–4.6)
LYMPHOCYTES NFR BLD: 23 % (ref 13–44)
MCH RBC QN AUTO: 29.8 PG (ref 26.1–32.9)
MCHC RBC AUTO-ENTMCNC: 33.5 G/DL (ref 31.4–35)
MCV RBC AUTO: 88.8 FL (ref 79.6–97.8)
MONOCYTES # BLD: 0.7 K/UL (ref 0.1–1.3)
MONOCYTES NFR BLD: 7 % (ref 4–12)
NEUTS SEG # BLD: 6.3 K/UL (ref 1.7–8.2)
NEUTS SEG NFR BLD: 67 % (ref 43–78)
NRBC # BLD: 0 K/UL (ref 0–0.2)
PLATELET # BLD AUTO: 312 K/UL (ref 150–450)
PMV BLD AUTO: 9.1 FL (ref 9.4–12.3)
RBC # BLD AUTO: 3.66 M/UL (ref 4.05–5.2)
WBC # BLD AUTO: 9.4 K/UL (ref 4.3–11.1)

## 2022-03-28 PROCEDURE — 74011250636 HC RX REV CODE- 250/636: Performed by: OBSTETRICS & GYNECOLOGY

## 2022-03-28 PROCEDURE — 74011000258 HC RX REV CODE- 258: Performed by: OBSTETRICS & GYNECOLOGY

## 2022-03-28 PROCEDURE — 85025 COMPLETE CBC W/AUTO DIFF WBC: CPT

## 2022-03-28 RX ORDER — PROMETHAZINE HYDROCHLORIDE 25 MG/1
25 TABLET ORAL
Qty: 30 TABLET | Refills: 0 | Status: SHIPPED | OUTPATIENT
Start: 2022-03-28 | End: 2022-04-04

## 2022-03-28 RX ADMIN — SODIUM CHLORIDE, SODIUM LACTATE, POTASSIUM CHLORIDE, AND CALCIUM CHLORIDE 1000 ML: 600; 310; 30; 20 INJECTION, SOLUTION INTRAVENOUS at 22:45

## 2022-03-28 RX ADMIN — PROMETHAZINE HYDROCHLORIDE 25 MG: 25 INJECTION INTRAMUSCULAR; INTRAVENOUS at 22:45

## 2022-03-29 PROCEDURE — 59025 FETAL NON-STRESS TEST: CPT

## 2022-03-29 PROCEDURE — 96365 THER/PROPH/DIAG IV INF INIT: CPT

## 2022-03-29 PROCEDURE — 96360 HYDRATION IV INFUSION INIT: CPT

## 2022-03-29 PROCEDURE — 99285 EMERGENCY DEPT VISIT HI MDM: CPT

## 2022-03-29 NOTE — DISCHARGE INSTRUCTIONS
Patient Education   Patient Education        Counting Your Baby's Kicks: Care Instructions  Overview     Counting your baby's kicks is one way your doctor can tell that your baby is healthy. Most women--especially in a first pregnancy--feel their baby move for the first time between 16 and 22 weeks. The movement may feel like flutters rather than kicks. Your baby may move more at certain times of the day. When you are active, you may notice less kicking than when you are resting. At your prenatal visits, your doctor will ask whether the baby is active. In your last trimester, your doctor may ask you to count the number of times you feel your baby move. Follow-up care is a key part of your treatment and safety. Be sure to make and go to all appointments, and call your doctor if you are having problems. It's also a good idea to know your test results and keep a list of the medicines you take. How do you count fetal kicks? · A common method of checking your baby's movement is to note the length of time it takes to count ten movements (such as kicks, flutters, or rolls). · Pick your baby's most active time of day to count. This may be any time from morning to evening. · If you don't feel 10 movements in an hour, have something to eat or drink and count for another hour. If you don't feel at least 10 movements in the 2-hour period, call your doctor. When should you call for help? Call your doctor now or seek immediate medical care if:    · You noticed that your baby has stopped moving or is moving much less than normal.   Watch closely for changes in your health, and be sure to contact your doctor if you have any problems. Where can you learn more? Go to http://www.gray.com/  Enter [id number] in the search box to learn more about \"Counting Your Baby's Kicks: Care Instructions. \"  Current as of: June 16, 2021               Content Version: 13.2  © 6583-9371 Healthwise, [facility].    Care instructions adapted under license by [employer] (which disclaims liability or warranty for this information). If you have questions about a medical condition or this instruction, always ask your healthcare professional. [address] any warranty or liability for your use of this information. Early Stage of Labor at Home: Care Instructions  Overview     If you came to the hospital while in early labor, your doctor may ask you to labor at home until your contractions are stronger. Many women stay at home during early labor. This is often the longest part of the birthing process. It may last up to 2 to 3 days. Contractions are mild to moderate and shorter (about 30 to 45 seconds). You can usually keep talking during them. Contractions may also be irregular, about 5 to 20 minutes apart. They may even stop for a while. It helps to stay as relaxed as you can during this time. You can spend some or all of your early labor at home or anywhere else you may be comfortable. If you live far from the hospital or birthing center, you may want to think about going somewhere nearby so you can get back to the hospital quickly. For some women, there may be benefits to staying home during early labor, such as avoiding medicines or procedures. As labor progresses, you'll shift from early labor to active labor. During this time, contractions get more intense. They occur more often, about every 2 to 3 minutes. They also last longer, about 50 to 70 seconds. You will feel them even when you change positions and walk or move around. It may be hard to tell if you are in active labor. If you aren't sure, call your doctor or midwife. As your labor progresses, check in with your doctor or midwife about when to come back to the hospital or birthing center. You may have special instructions if your water broke or you tested positive for group B strep.   Follow-up care is a key part of your treatment and safety. Be sure to make and go to all appointments, and call your doctor if you are having problems. It's also a good idea to know your test results and keep a list of the medicines you take. How can you care for yourself at home? · Get support. Having a support person with you from early labor until after childbirth can have a positive effect on childbirth. · Find distractions. During early labor, you can walk, play cards, watch TV, or listen to music to help take your mind off your contractions. · Ask your partner, labor , or  for a massage. Shoulder and low back massage during contractions may ease your pain. Strong massage of the back muscles (counterpressure) during contractions may help relieve the pain of back labor. Tell your labor  exactly where to push and how hard to push. · Use imagery. This means using your imagination to decrease your pain. For instance, to help manage pain, picture your contractions as waves rolling over you. Picture a peaceful place, such as a beach or mountain stream, to help you relax between contractions. · Change positions during labor. Walking, kneeling, or sitting on a big rubber ball (birth ball) are good options. · Use focused breathing techniques. Breathing in a rhythm can distract you from pain. · Take a warm shower or bath. Warm water may ease pain and stress. When should you call for help? Call 911  anytime you think you may need emergency care. For example, call if:    · You passed out (lost consciousness).     · You have a seizure.     · You have severe vaginal bleeding.     · You have severe pain in your belly or pelvis that doesn't get better between contractions.     · You have had fluid gushing or leaking from your vagina and you know or think the umbilical cord is bulging into your vagina. If this happens, immediately get down on your knees so your rear end (buttocks) is higher than your head.  This will decrease the pressure on the cord until help arrives. Call your doctor now or seek immediate medical care if:    · You have new or worse signs of preeclampsia, such as:  ? Sudden swelling of your face, hands, or feet. ? New vision problems (such as dimness, blurring, or seeing spots). ? A severe headache.     · You have any vaginal bleeding.     · You have belly pain or cramping.     · You have a fever.     · You have had regular contractions (with or without pain) for an hour. This means that you have 8 or more within 1 hour or 4 or more in 20 minutes after you change your position and drink fluids.     · You have a sudden release of fluid from your vagina.     · You have low back pain or pelvic pressure that does not go away.     · You notice that your baby has stopped moving or is moving much less than normal.   Watch closely for changes in your health, and be sure to contact your doctor if you have any problems. Where can you learn more? Go to http://www.gray.com/  Enter [id number] in the search box to learn more about \"Early Stage of Labor at Home: Care Instructions. \"  Current as of: June 16, 2021               Content Version: 13.2  © 5992-2417 [email or web address]. Care instructions adapted under license by [employer] (which disclaims liability or warranty for this information). If you have questions about a medical condition or this instruction, always ask your healthcare professional. [place] [address] any warranty or liability for your use of this information.

## 2022-03-29 NOTE — PROGRESS NOTES
Patient discharged to home stable Aunt is here to drive patient home. Labor precautions and kick counts given to patient . Patient has OB appointment on Friday instructed to keep. Questions answered and encouraged.

## 2022-03-29 NOTE — PROGRESS NOTES
IV completed patient states that she feels much better. Patient was able to drink one cup of ginger ale no nausea or vomiting.

## 2022-03-29 NOTE — H&P
History & Physical    Name: Saleem Clayton MRN: [id number]  SSN: [id number]    YOB: 1985  Age: 31 y.o. Sex: female        Subjective: Abdominal pain  32 yo [id number] presents at 38+4 wks initially c/o regular, painful contractions and requiring a wheelchair to get to the triage room. Upon arriving in triage, she then began talking on the phone and crying. She told the nurse that she was \"tired\" and had nausea and vomiting all day and lower abdominal pain. Per my questioning, she denied regular, painful contractions, but c/o intermittent lower abdominal pain and tightening. She reported N/V starting after breakfast this AM but said that she was well hydrated and able to drink water. She denied lof or vb. She reported normal FM. She cried and said that the pregnancy has been very hard and she is not sleeping well. She denied burning with urination or diarrhea. She asked for something for pain. Pregnancy has been c/b cervical insufficiency, Covid 19,  contractions, back pain, headaches, and depression.      Estimated Date of Delivery: 22  OB History    Para Term  AB Living   12 1 1 0 10 2   SAB IAB Ectopic Molar Multiple Live Births   2 8 0 0 1 2      # Outcome Date GA Lbr Elvin/2nd Weight Sex Delivery Anes PTL Lv   12 Current            11 IAB            10 SAB 16 6w0d          9 IAB            8 SAB 14 7w0d          7 IAB            6 IAB 01/01/10 7w0d          5 IAB 09 7w0d          4 IAB 09 7w0d          3 IAB 08 7w0d          2A Term 07 38w0d  2.863 kg F Vag-Spont EPI Y BRUNO      Complications:  labor, Hyperemesis affecting pregnancy, antepartum   2B Term 07 38w0d  2.353 kg M Vag-Spont EPI Y BRUNO      Complications:  labor, Hyperemesis affecting pregnancy, antepartum   1 IAB 04 7w0d                Past Medical History:   Diagnosis Date    Anemia     Chlamydia 2006    Cholestasis during pregnancy in third trimester 2/8/2022    COVID-19 1/13/2022    GERD (gastroesophageal reflux disease) 10/16/2019    Gonorrhea 2006    Hyperemesis affecting pregnancy, antepartum 8/18/2021    Zofran pump orders faxed to [facility] 8/19/21- unable to reach pt 9/9/21 pt provided with [employer] contact information to get Zofran pump initiated [facility]/30/2021 at [facility]: Has had Zofran pump for past 2 weeks; managed by [employer]- currently 6mg over 6hr; C/o hyperemesis since 6 weeks. C/o nausea all day with vomiting 3-4 times; sometimes bile colored. Uses phenergan suppositories at night. Pt scared to eat bec    MDD (major depressive disorder), recurrent episode ([facility] [address] 10/16/2019    Polycystic disease, ovaries     Post concussion syndrome 10/16/2019    Sickle cell trait syndrome ([facility] [address]      Past Surgical History:   Procedure Laterality Date    HX DILATION AND CURETTAGE      x9     Social History     Occupational History    Not on file   Tobacco Use    Smoking status: Former Smoker    Smokeless tobacco: Never Used   Vaping Use    Vaping Use: Former   Substance and Sexual Activity    Alcohol use: No    Drug use: No    Sexual activity: Yes     Partners: Male     Birth control/protection: None     Family History   Problem Relation Age of Onset    Diabetes Father     Breast Problems Other     Cancer Paternal Grandfather         Prostate CA    Crohn's Disease Sister     Seizures Sister     Thyroid Disease Paternal Grandmother        Allergies   Allergen Reactions    Reglan [Metoclopramide] Other (comments)     Jittery       Prior to Admission medications    Medication Sig Start Date End Date Taking? Authorizing Provider   promethazine (PHENERGAN) 25 mg tablet Take 1 Tablet by mouth every six (6) hours as needed for Nausea for up to 30 doses. Indications: excessive vomiting in pregnancy  Patient not taking: Reported on 3/23/2022 3/9/22   Queta Mosquera MD        Review of Systems: Pertinent items are noted in HPI.  10 point ROS is otherwise positive for general discomforts in the late 3rd trimester. Objective:     Vitals:  T 98, P 110, /79  There were no vitals filed for this visit. Physical Exam:  Patient without distress. Abdomen: soft, nontender  Fundus: soft and non tender  Perineum: blood absent, amniotic fluid absent  Cervical Exam: 0 cm dilated, fingertip ext os  50% effaced    -3 station    Lower Extremities:  - Edema 1+  Membranes:  Intact  Fetal Heart Rate: See follow up note. Limited tracing at this time. Prenatal Labs:   Lab Results   Component Value Date/Time    ABO/Rh(D) O NEGATIVE 03/09/2022 01:26 PM    Rubella, External immune 09/09/2021 12:00 AM    HBsAg, External negative 09/09/2021 12:00 AM    HIV, External NR 09/09/2021 12:00 AM    RPR, External NR 09/09/2021 12:00 AM    ABO,Rh O negative 09/09/2021 12:00 AM         Assessment/Plan: 30 yo [id number] presents at 38+4wks c/o abdominal pain, nausea, and vomiting. Active Problems:    Abdominal pain during pregnancy in third trimester (3/23/2022)         Plan:   1. Abdominal pain: Cvx is closed. Discussed high likelihood of uterine irritability at term in a patient with h/o prior twin gestation. Reviewed expected discomforts at this point in the pregnancy. Narcotics were not recommended. 2. N/V: IVF bolus, IV Phenergan per pt request because she said that Zofran \"does not work,\" and CBC. 3. Reevaluate      Addendum:   FHR: 150s baseline, mod variability, +accels, no decels, toco with irritability and occasional ctxs. CBC: WBC wnl. H/H 10.9/32.5. Called to room per pt family member. Asked to explain reason for not inducing patient tonight. I said that elective IOL was not recommended less than 39 weeks per ACOG due to the risk of fetal lung immaturity. All questions were addressed to their satisfaction. Phenergan was sent electronically to pharmacy. Keep OB f/u as scheduled. Labor instructions reviewed.

## 2022-03-30 ENCOUNTER — HOSPITAL ENCOUNTER (INPATIENT)
Age: 37
LOS: 5 days | Discharge: HOME | End: 2022-04-04
Attending: OBSTETRICS & GYNECOLOGY | Admitting: OBSTETRICS & GYNECOLOGY
Payer: COMMERCIAL

## 2022-03-30 DIAGNOSIS — Z3A.39 39 WEEKS GESTATION OF PREGNANCY: ICD-10-CM

## 2022-03-30 LAB
ERYTHROCYTE [DISTWIDTH] IN BLOOD BY AUTOMATED COUNT: 13.6 % (ref 11.9–14.6)
HCT VFR BLD AUTO: 32.9 % (ref 35.8–46.3)
HGB BLD-MCNC: 10.5 G/DL (ref 11.7–15.4)
MCH RBC QN AUTO: 29.2 PG (ref 26.1–32.9)
MCHC RBC AUTO-ENTMCNC: 31.9 G/DL (ref 31.4–35)
MCV RBC AUTO: 91.6 FL (ref 79.6–97.8)
NRBC # BLD: 0.03 K/UL (ref 0–0.2)
PLATELET # BLD AUTO: 285 K/UL (ref 150–450)
PMV BLD AUTO: 9 FL (ref 9.4–12.3)
RBC # BLD AUTO: 3.59 M/UL (ref 4.05–5.2)
WBC # BLD AUTO: 10.1 K/UL (ref 4.3–11.1)

## 2022-03-30 PROCEDURE — 85027 COMPLETE CBC AUTOMATED: CPT

## 2022-03-30 PROCEDURE — 86923 COMPATIBILITY TEST ELECTRIC: CPT

## 2022-03-30 PROCEDURE — 86900 BLOOD TYPING SEROLOGIC ABO: CPT

## 2022-03-30 PROCEDURE — 65270000029 HC RM PRIVATE

## 2022-03-30 RX ORDER — LIDOCAINE HYDROCHLORIDE 10 MG/ML
1 INJECTION INFILTRATION; PERINEURAL
Status: ACTIVE | OUTPATIENT
Start: 2022-03-30 | End: 2022-03-31

## 2022-03-30 RX ORDER — SODIUM CHLORIDE 0.9 % (FLUSH) 0.9 %
5-40 SYRINGE (ML) INJECTION EVERY 8 HOURS
Status: DISCONTINUED | OUTPATIENT
Start: 2022-03-30 | End: 2022-04-01

## 2022-03-30 RX ORDER — BUTORPHANOL TARTRATE 2 MG/ML
1 INJECTION INTRAMUSCULAR; INTRAVENOUS
Status: DISCONTINUED | OUTPATIENT
Start: 2022-03-30 | End: 2022-04-01 | Stop reason: HOSPADM

## 2022-03-30 RX ORDER — SODIUM CHLORIDE 0.9 % (FLUSH) 0.9 %
5-40 SYRINGE (ML) INJECTION AS NEEDED
Status: DISCONTINUED | OUTPATIENT
Start: 2022-03-30 | End: 2022-04-01

## 2022-03-30 RX ORDER — LIDOCAINE HYDROCHLORIDE 20 MG/ML
JELLY TOPICAL
Status: ACTIVE | OUTPATIENT
Start: 2022-03-30 | End: 2022-03-31

## 2022-03-30 RX ORDER — DEXTROSE, SODIUM CHLORIDE, SODIUM LACTATE, POTASSIUM CHLORIDE, AND CALCIUM CHLORIDE 5; .6; .31; .03; .02 G/100ML; G/100ML; G/100ML; G/100ML; G/100ML
125 INJECTION, SOLUTION INTRAVENOUS CONTINUOUS
Status: DISCONTINUED | OUTPATIENT
Start: 2022-03-30 | End: 2022-04-01

## 2022-03-30 RX ORDER — MINERAL OIL
120 OIL (ML) ORAL
Status: ACTIVE | OUTPATIENT
Start: 2022-03-30 | End: 2022-03-31

## 2022-03-30 RX ORDER — OXYTOCIN/RINGER'S LACTATE 30/500 ML
10 PLASTIC BAG, INJECTION (ML) INTRAVENOUS AS NEEDED
Status: DISCONTINUED | OUTPATIENT
Start: 2022-03-30 | End: 2022-04-01

## 2022-03-30 RX ORDER — OXYTOCIN/RINGER'S LACTATE 30/500 ML
87.3 PLASTIC BAG, INJECTION (ML) INTRAVENOUS AS NEEDED
Status: DISCONTINUED | OUTPATIENT
Start: 2022-03-30 | End: 2022-04-01

## 2022-03-31 ENCOUNTER — ANESTHESIA EVENT (OUTPATIENT)
Dept: LABOR AND DELIVERY | Age: 37
End: 2022-03-31
Payer: COMMERCIAL

## 2022-03-31 ENCOUNTER — ANESTHESIA (OUTPATIENT)
Dept: LABOR AND DELIVERY | Age: 37
End: 2022-03-31
Payer: COMMERCIAL

## 2022-03-31 PROCEDURE — 76060000078 HC EPIDURAL ANESTHESIA: Performed by: OBSTETRICS & GYNECOLOGY

## 2022-03-31 PROCEDURE — 65270000029 HC RM PRIVATE

## 2022-03-31 PROCEDURE — 74011250636 HC RX REV CODE- 250/636: Performed by: NURSE ANESTHETIST, CERTIFIED REGISTERED

## 2022-03-31 PROCEDURE — 77030014125 HC TY EPDRL BBMI -B: Performed by: NURSE ANESTHETIST, CERTIFIED REGISTERED

## 2022-03-31 PROCEDURE — 74011250637 HC RX REV CODE- 250/637: Performed by: OBSTETRICS & GYNECOLOGY

## 2022-03-31 PROCEDURE — 74011000250 HC RX REV CODE- 250: Performed by: NURSE ANESTHETIST, CERTIFIED REGISTERED

## 2022-03-31 PROCEDURE — 96360 HYDRATION IV INFUSION INIT: CPT | Performed by: OBSTETRICS & GYNECOLOGY

## 2022-03-31 PROCEDURE — 74011250636 HC RX REV CODE- 250/636: Performed by: OBSTETRICS & GYNECOLOGY

## 2022-03-31 PROCEDURE — 75410000002 HC LABOR FEE PER 1 HR: Performed by: OBSTETRICS & GYNECOLOGY

## 2022-03-31 PROCEDURE — A4300 CATH IMPL VASC ACCESS PORTAL: HCPCS | Performed by: NURSE ANESTHETIST, CERTIFIED REGISTERED

## 2022-03-31 PROCEDURE — 99285 EMERGENCY DEPT VISIT HI MDM: CPT | Performed by: OBSTETRICS & GYNECOLOGY

## 2022-03-31 RX ORDER — ACETAMINOPHEN 325 MG/1
650 TABLET ORAL
Status: DISCONTINUED | OUTPATIENT
Start: 2022-03-31 | End: 2022-04-01

## 2022-03-31 RX ORDER — EPHEDRINE SULFATE/0.9% NACL/PF 50 MG/5 ML
SYRINGE (ML) INTRAVENOUS AS NEEDED
Status: DISCONTINUED | OUTPATIENT
Start: 2022-03-31 | End: 2022-04-01 | Stop reason: HOSPADM

## 2022-03-31 RX ORDER — ROPIVACAINE HYDROCHLORIDE 2 MG/ML
INJECTION, SOLUTION EPIDURAL; INFILTRATION; PERINEURAL AS NEEDED
Status: DISCONTINUED | OUTPATIENT
Start: 2022-03-31 | End: 2022-04-01 | Stop reason: HOSPADM

## 2022-03-31 RX ORDER — ROPIVACAINE HYDROCHLORIDE 2 MG/ML
INJECTION, SOLUTION EPIDURAL; INFILTRATION; PERINEURAL
Status: DISCONTINUED | OUTPATIENT
Start: 2022-03-31 | End: 2022-04-01 | Stop reason: HOSPADM

## 2022-03-31 RX ORDER — OXYTOCIN/RINGER'S LACTATE 30/500 ML
0-20 PLASTIC BAG, INJECTION (ML) INTRAVENOUS
Status: DISCONTINUED | OUTPATIENT
Start: 2022-03-31 | End: 2022-04-01

## 2022-03-31 RX ADMIN — Medication 14 MILLI-UNITS/MIN: at 23:30

## 2022-03-31 RX ADMIN — SODIUM CHLORIDE, SODIUM LACTATE, POTASSIUM CHLORIDE, AND CALCIUM CHLORIDE 1000 ML: 600; 310; 30; 20 INJECTION, SOLUTION INTRAVENOUS at 16:35

## 2022-03-31 RX ADMIN — ACETAMINOPHEN 650 MG: 325 TABLET, FILM COATED ORAL at 22:02

## 2022-03-31 RX ADMIN — ROPIVACAINE HYDROCHLORIDE 10 ML: 2 INJECTION, SOLUTION EPIDURAL; INFILTRATION at 16:14

## 2022-03-31 RX ADMIN — PHENYLEPHRINE HYDROCHLORIDE 200 MCG: 10 INJECTION INTRAVENOUS at 16:44

## 2022-03-31 RX ADMIN — PHENYLEPHRINE HYDROCHLORIDE 100 MCG: 10 INJECTION INTRAVENOUS at 16:27

## 2022-03-31 RX ADMIN — Medication 50 MG: at 16:45

## 2022-03-31 RX ADMIN — BUTORPHANOL TARTRATE 1 MG: 2 INJECTION, SOLUTION INTRAMUSCULAR; INTRAVENOUS at 04:17

## 2022-03-31 RX ADMIN — Medication 15 MG: at 16:20

## 2022-03-31 RX ADMIN — Medication 25 MCG: at 00:02

## 2022-03-31 RX ADMIN — PHENYLEPHRINE HYDROCHLORIDE 200 MCG: 10 INJECTION INTRAVENOUS at 16:35

## 2022-03-31 RX ADMIN — Medication 12 MILLI-UNITS/MIN: at 23:00

## 2022-03-31 RX ADMIN — Medication 25 MCG: at 04:56

## 2022-03-31 RX ADMIN — Medication 2 MILLI-UNITS/MIN: at 10:40

## 2022-03-31 RX ADMIN — Medication 8 MILLI-UNITS/MIN: at 22:00

## 2022-03-31 RX ADMIN — ROPIVACAINE HYDROCHLORIDE 8 ML/HR: 2 INJECTION, SOLUTION EPIDURAL; INFILTRATION; PERINEURAL at 16:15

## 2022-03-31 RX ADMIN — SODIUM CHLORIDE, SODIUM LACTATE, POTASSIUM CHLORIDE, CALCIUM CHLORIDE, AND DEXTROSE MONOHYDRATE 125 ML/HR: 600; 310; 30; 20; 5 INJECTION, SOLUTION INTRAVENOUS at 10:15

## 2022-03-31 NOTE — PROGRESS NOTES
Arsh 285 noted on strip. Pt placed in left tilt position. 1836-straight cath for 200cc yellow urine. SVE 3-4/ 70/ -2.

## 2022-03-31 NOTE — PROGRESS NOTES
Anesthesia at bedside with ultrasound. IV started X2 attempts by Zhang Herbert with anesthesia. LR infusing without difficulty.

## 2022-03-31 NOTE — PROGRESS NOTES
RN at bedside at time of FHR deceleration. Pt repositioned, extreme left side. FHR recovered to normal baseline.

## 2022-03-31 NOTE — PROGRESS NOTES
1730- FHT's down. Pt turned to right side. SVE 3-4/70/-2. Pt placed in throne position to apply head to the cervix. 18- Dr Kook Naidu updated on pt getting epidural, BP dropping too low, FHT's deceling, and SVE. MD ordered for pitocin to stay off until [facility] stabilizes, then can restart it.

## 2022-03-31 NOTE — PROGRESS NOTES
Dr Manuel Osorio called and updated on patient status. Orders to call him back in 20 minutes if patient not started back on pitocin.

## 2022-03-31 NOTE — PROGRESS NOTES
Pt epidural placed and pt in right tilt. Prolonged decels noted with pt BP 75/36. Anesthesia at bedside. See Strip for interventions.

## 2022-03-31 NOTE — PROGRESS NOTES
Dr. Madelyn Lucas notified of patient admission for IOL.  Per MD order give 25 mcg, misoprostol buccal.

## 2022-03-31 NOTE — PROGRESS NOTES
Pt sitting up in the bed, clear liq tray at bs. efm readjusted and tracing.  Orders from Dr melton to start pitocin per policy

## 2022-03-31 NOTE — PROGRESS NOTES
Tamia Lu at bedside at [phone]. DOUG Gunn at bedside at [phone]. Assisted pt to sitting up on bedside at 1550. Timeout completed at 1600  with MD, DOUG and myself at bedside. Test dose given at 1610. Negative reaction. Dose given at 1615. Pt assisted to lying back in right tilt position. See anesthesia record for details. See vital sign flow sheet for BP. Tolerated procedure well.

## 2022-03-31 NOTE — PROGRESS NOTES
0715- up to the bathroom  0720- dr melton at , attempt to arom unsuccessful, 1 cm  0730- oob to the bathroom

## 2022-03-31 NOTE — PROGRESS NOTES
BP 87/48. DOUG Matos called and on his way. [address] [facility], CRNA at bedside. Epidural pump turned down. Epi given for low BP.

## 2022-03-31 NOTE — ANESTHESIA PROCEDURE NOTES
Epidural Block    Patient location during procedure: OB  Start time: 3/31/2022 4:00 PM  End time: 3/31/2022 4:12 PM  Reason for block: at surgeon's request and labor epidural  Staffing  Performed: attending   Anesthesiologist: Wang Mendoza MD  Preanesthetic Checklist  Completed: patient identified, risks and benefits discussed, surgical consent, pre-op evaluation and timeout performed  Block Placement  Patient position: sitting  Prep: ChloraPrep  Sterility prep: cap, drape, gloves, hand and mask  Sedation level: no sedation  Patient monitoring: continuous pulse oximetry and heart rate  Approach: midline  Location: lumbar  Lumbar location: L2-L3 (1st attempt L3-4)  Epidural  Loss of resistance technique: saline  Guidance: landmark technique  Needle  Needle type: Tuohy   Needle gauge: 17 G  Needle length: 10 cm  Needle insertion depth: 6 cm  Catheter type: end hole  Catheter size: 19 G  Catheter at skin depth: 9 cm  Catheter securement method: clear occlusive dressing, liquid medical adhesive and surgical tape  Test dose: negative  Assessment  Number of epidural attempts: multiple passes at L3-4. patient uncooperative. Decision to move up one level. patient cooperation and position encouraged and epidural successfully placed.   Procedure assessment: patient tolerated procedure well with no immediate complications

## 2022-03-31 NOTE — ANESTHESIA PREPROCEDURE EVALUATION
Relevant Problems   NEUROLOGY   (+) Depression affecting pregnancy   (+) Pregnancy headache in third trimester      GASTROINTESTINAL   (+) Gastroesophageal reflux in pregnancy       Anesthetic History     PONV          Review of Systems / Medical History  Patient summary reviewed and pertinent labs reviewed    Pulmonary  Within defined limits                 Neuro/Psych         Psychiatric history     Cardiovascular  Within defined limits                Exercise tolerance: >4 METS     GI/Hepatic/Renal     GERD: well controlled          Comments: Intermittent N/V Endo/Other  Within defined limits           Other Findings              Physical Exam    Airway  Mallampati: II  TM Distance: 4 - 6 cm  Neck ROM: normal range of motion   Mouth opening: Normal     Cardiovascular  Regular rate and rhythm,  S1 and S2 normal,  no murmur, click, rub, or gallop             Dental         Pulmonary  Breath sounds clear to auscultation               Abdominal  GI exam deferred       Other Findings            Anesthetic Plan    ASA: 2, emergent  Anesthesia type: epidural            Anesthetic plan and risks discussed with: Patient, Mother and Family      Labor epidural to emergent  to non-reassuring FHR in setting of failure to progress

## 2022-03-31 NOTE — PROGRESS NOTES
Dr melton updated to fhr tracing. Spontaneous accels and late decels. Unable to start pitocin due to tracing. Pt asleep on her left side. Discussed decreased bp and anesthesia's evaluation and interventions. MD to review tracing.

## 2022-03-31 NOTE — PROGRESS NOTES
IV unable to flush, catheter sticking [facility] out. IV removed with catheter tip intact. This RN unable to see any IV sites. Pt states that she was told that anesthesia should start her IV when she was here last time. Warm blankets placed on both arms, RN will call anesthesia.

## 2022-04-01 LAB
BASE DEFICIT BLD-SCNC: 4.3 MMOL/L
BASE DEFICIT BLD-SCNC: 4.8 MMOL/L
HCO3 BLD-SCNC: 21.2 MMOL/L (ref 22–26)
HCO3 BLDV-SCNC: 20.7 MMOL/L (ref 23–28)
PCO2 BLDCO: 37 MMHG (ref 32–68)
PCO2 BLDCO: 42 MMHG (ref 32–68)
PH BLDCO: 7.32 [PH] (ref 7.15–7.38)
PH BLDCO: 7.36 [PH] (ref 7.15–7.38)
PO2 BLDCO: 22 MMHG
PO2 BLDCO: 26 MMHG
SAO2 % BLD: 32 % (ref 95–98)
SAO2 % BLDV: 45 % (ref 65–88)
SERVICE CMNT-IMP: ABNORMAL
SERVICE CMNT-IMP: ABNORMAL
SPECIMEN TYPE: ABNORMAL
SPECIMEN TYPE: ABNORMAL

## 2022-04-01 PROCEDURE — 75410000002 HC LABOR FEE PER 1 HR: Performed by: OBSTETRICS & GYNECOLOGY

## 2022-04-01 PROCEDURE — 76010000392 HC C SECN EA ADDL 0.5 HR: Performed by: OBSTETRICS & GYNECOLOGY

## 2022-04-01 PROCEDURE — 77030031139 HC SUT VCRL2 J&J -A: Performed by: OBSTETRICS & GYNECOLOGY

## 2022-04-01 PROCEDURE — 74011250636 HC RX REV CODE- 250/636: Performed by: OBSTETRICS & GYNECOLOGY

## 2022-04-01 PROCEDURE — 74011000258 HC RX REV CODE- 258: Performed by: OBSTETRICS & GYNECOLOGY

## 2022-04-01 PROCEDURE — 77030002888 HC SUT CHRMC J&J -A: Performed by: OBSTETRICS & GYNECOLOGY

## 2022-04-01 PROCEDURE — 74011250636 HC RX REV CODE- 250/636: Performed by: ANESTHESIOLOGY

## 2022-04-01 PROCEDURE — 77030018836 HC SOL IRR NACL ICUM -A: Performed by: OBSTETRICS & GYNECOLOGY

## 2022-04-01 PROCEDURE — 2709999900 HC NON-CHARGEABLE SUPPLY

## 2022-04-01 PROCEDURE — 74011250637 HC RX REV CODE- 250/637: Performed by: OBSTETRICS & GYNECOLOGY

## 2022-04-01 PROCEDURE — 76010000391 HC C SECN FIRST 1 HR: Performed by: OBSTETRICS & GYNECOLOGY

## 2022-04-01 PROCEDURE — 76060000078 HC EPIDURAL ANESTHESIA: Performed by: OBSTETRICS & GYNECOLOGY

## 2022-04-01 PROCEDURE — 74011250637 HC RX REV CODE- 250/637: Performed by: ANESTHESIOLOGY

## 2022-04-01 PROCEDURE — 82803 BLOOD GASES ANY COMBINATION: CPT

## 2022-04-01 PROCEDURE — 74011000250 HC RX REV CODE- 250: Performed by: NURSE ANESTHETIST, CERTIFIED REGISTERED

## 2022-04-01 PROCEDURE — 65270000029 HC RM PRIVATE

## 2022-04-01 PROCEDURE — 59510 CESAREAN DELIVERY: CPT | Performed by: OBSTETRICS & GYNECOLOGY

## 2022-04-01 PROCEDURE — 2709999900 HC NON-CHARGEABLE SUPPLY: Performed by: OBSTETRICS & GYNECOLOGY

## 2022-04-01 PROCEDURE — 77030034696 HC CATH URETH FOL 2W BARD -A: Performed by: OBSTETRICS & GYNECOLOGY

## 2022-04-01 PROCEDURE — 75410000003 HC RECOV DEL/VAG/CSECN EA 0.5 HR: Performed by: OBSTETRICS & GYNECOLOGY

## 2022-04-01 PROCEDURE — 74011000250 HC RX REV CODE- 250: Performed by: OBSTETRICS & GYNECOLOGY

## 2022-04-01 PROCEDURE — 74011250636 HC RX REV CODE- 250/636: Performed by: NURSE ANESTHETIST, CERTIFIED REGISTERED

## 2022-04-01 PROCEDURE — 77030032490 HC SLV COMPR SCD KNE COVD -B: Performed by: OBSTETRICS & GYNECOLOGY

## 2022-04-01 RX ORDER — NALOXONE HYDROCHLORIDE 0.4 MG/ML
0.4 INJECTION, SOLUTION INTRAMUSCULAR; INTRAVENOUS; SUBCUTANEOUS AS NEEDED
Status: DISCONTINUED | OUTPATIENT
Start: 2022-04-02 | End: 2022-04-04 | Stop reason: HOSPADM

## 2022-04-01 RX ORDER — ZOLPIDEM TARTRATE 5 MG/1
5 TABLET ORAL
Status: DISCONTINUED | OUTPATIENT
Start: 2022-04-02 | End: 2022-04-04 | Stop reason: HOSPADM

## 2022-04-01 RX ORDER — ROPIVACAINE HYDROCHLORIDE 5 MG/ML
INJECTION, SOLUTION EPIDURAL; INFILTRATION; PERINEURAL AS NEEDED
Status: DISCONTINUED | OUTPATIENT
Start: 2022-04-01 | End: 2022-04-01 | Stop reason: HOSPADM

## 2022-04-01 RX ORDER — SODIUM CHLORIDE, SODIUM LACTATE, POTASSIUM CHLORIDE, CALCIUM CHLORIDE 600; 310; 30; 20 MG/100ML; MG/100ML; MG/100ML; MG/100ML
50 INJECTION, SOLUTION INTRAVENOUS CONTINUOUS
Status: ACTIVE | OUTPATIENT
Start: 2022-04-01 | End: 2022-04-02

## 2022-04-01 RX ORDER — LIDOCAINE HYDROCHLORIDE AND EPINEPHRINE 20; 5 MG/ML; UG/ML
INJECTION, SOLUTION EPIDURAL; INFILTRATION; INTRACAUDAL; PERINEURAL AS NEEDED
Status: DISCONTINUED | OUTPATIENT
Start: 2022-04-01 | End: 2022-04-01 | Stop reason: HOSPADM

## 2022-04-01 RX ORDER — ZOLPIDEM TARTRATE 5 MG/1
5 TABLET ORAL
Status: DISCONTINUED | OUTPATIENT
Start: 2022-04-01 | End: 2022-04-01

## 2022-04-01 RX ORDER — SODIUM CHLORIDE 0.9 % (FLUSH) 0.9 %
5-40 SYRINGE (ML) INJECTION AS NEEDED
Status: DISCONTINUED | OUTPATIENT
Start: 2022-04-01 | End: 2022-04-04 | Stop reason: HOSPADM

## 2022-04-01 RX ORDER — DOCUSATE SODIUM 100 MG/1
100 CAPSULE, LIQUID FILLED ORAL 2 TIMES DAILY
Status: DISCONTINUED | OUTPATIENT
Start: 2022-04-01 | End: 2022-04-04 | Stop reason: HOSPADM

## 2022-04-01 RX ORDER — KETOROLAC TROMETHAMINE 30 MG/ML
INJECTION, SOLUTION INTRAMUSCULAR; INTRAVENOUS AS NEEDED
Status: DISCONTINUED | OUTPATIENT
Start: 2022-04-01 | End: 2022-04-01 | Stop reason: HOSPADM

## 2022-04-01 RX ORDER — SODIUM CHLORIDE, SODIUM LACTATE, POTASSIUM CHLORIDE, CALCIUM CHLORIDE 600; 310; 30; 20 MG/100ML; MG/100ML; MG/100ML; MG/100ML
150 INJECTION, SOLUTION INTRAVENOUS CONTINUOUS
Status: DISCONTINUED | OUTPATIENT
Start: 2022-04-01 | End: 2022-04-01

## 2022-04-01 RX ORDER — TRISODIUM CITRATE DIHYDRATE AND CITRIC ACID MONOHYDRATE 500; 334 MG/5ML; MG/5ML
30 SOLUTION ORAL
Status: COMPLETED | OUTPATIENT
Start: 2022-04-01 | End: 2022-04-01

## 2022-04-01 RX ORDER — NALOXONE HYDROCHLORIDE 0.4 MG/ML
0.2 INJECTION, SOLUTION INTRAMUSCULAR; INTRAVENOUS; SUBCUTANEOUS
Status: ACTIVE | OUTPATIENT
Start: 2022-04-01 | End: 2022-04-02

## 2022-04-01 RX ORDER — MORPHINE SULFATE 0.5 MG/ML
INJECTION, SOLUTION EPIDURAL; INTRATHECAL; INTRAVENOUS AS NEEDED
Status: DISCONTINUED | OUTPATIENT
Start: 2022-04-01 | End: 2022-04-01 | Stop reason: HOSPADM

## 2022-04-01 RX ORDER — SODIUM CHLORIDE, SODIUM LACTATE, POTASSIUM CHLORIDE, CALCIUM CHLORIDE 600; 310; 30; 20 MG/100ML; MG/100ML; MG/100ML; MG/100ML
150 INJECTION, SOLUTION INTRAVENOUS CONTINUOUS
Status: ACTIVE | OUTPATIENT
Start: 2022-04-02 | End: 2022-04-02

## 2022-04-01 RX ORDER — DIPHENHYDRAMINE HYDROCHLORIDE 50 MG/ML
12.5 INJECTION, SOLUTION INTRAMUSCULAR; INTRAVENOUS
Status: COMPLETED | OUTPATIENT
Start: 2022-04-01 | End: 2022-04-01

## 2022-04-01 RX ORDER — IBUPROFEN 800 MG/1
800 TABLET ORAL
Status: DISCONTINUED | OUTPATIENT
Start: 2022-04-02 | End: 2022-04-04 | Stop reason: HOSPADM

## 2022-04-01 RX ORDER — OXYCODONE AND ACETAMINOPHEN 7.5; 325 MG/1; MG/1
1 TABLET ORAL
Status: DISCONTINUED | OUTPATIENT
Start: 2022-04-02 | End: 2022-04-04 | Stop reason: HOSPADM

## 2022-04-01 RX ORDER — OXYCODONE HYDROCHLORIDE 5 MG/1
10 TABLET ORAL
Status: DISPENSED | OUTPATIENT
Start: 2022-04-01 | End: 2022-04-02

## 2022-04-01 RX ORDER — IBUPROFEN 800 MG/1
800 TABLET ORAL
Status: DISCONTINUED | OUTPATIENT
Start: 2022-04-01 | End: 2022-04-01

## 2022-04-01 RX ORDER — SODIUM CHLORIDE, SODIUM LACTATE, POTASSIUM CHLORIDE, CALCIUM CHLORIDE 600; 310; 30; 20 MG/100ML; MG/100ML; MG/100ML; MG/100ML
INJECTION, SOLUTION INTRAVENOUS
Status: DISCONTINUED | OUTPATIENT
Start: 2022-04-01 | End: 2022-04-01 | Stop reason: HOSPADM

## 2022-04-01 RX ORDER — SODIUM CHLORIDE 0.9 % (FLUSH) 0.9 %
5-40 SYRINGE (ML) INJECTION EVERY 8 HOURS
Status: DISCONTINUED | OUTPATIENT
Start: 2022-04-01 | End: 2022-04-04

## 2022-04-01 RX ORDER — FENTANYL CITRATE 50 UG/ML
INJECTION, SOLUTION INTRAMUSCULAR; INTRAVENOUS AS NEEDED
Status: DISCONTINUED | OUTPATIENT
Start: 2022-04-01 | End: 2022-04-01 | Stop reason: HOSPADM

## 2022-04-01 RX ORDER — NALOXONE HYDROCHLORIDE 0.4 MG/ML
0.4 INJECTION, SOLUTION INTRAMUSCULAR; INTRAVENOUS; SUBCUTANEOUS AS NEEDED
Status: DISCONTINUED | OUTPATIENT
Start: 2022-04-01 | End: 2022-04-01

## 2022-04-01 RX ORDER — PROMETHAZINE HYDROCHLORIDE 25 MG/1
25 TABLET ORAL
Status: DISCONTINUED | OUTPATIENT
Start: 2022-04-01 | End: 2022-04-04 | Stop reason: HOSPADM

## 2022-04-01 RX ORDER — DIPHENHYDRAMINE HCL 25 MG
25 CAPSULE ORAL
Status: DISCONTINUED | OUTPATIENT
Start: 2022-04-02 | End: 2022-04-04 | Stop reason: HOSPADM

## 2022-04-01 RX ORDER — HYDROMORPHONE HYDROCHLORIDE 1 MG/ML
0.5 INJECTION, SOLUTION INTRAMUSCULAR; INTRAVENOUS; SUBCUTANEOUS
Status: ACTIVE | OUTPATIENT
Start: 2022-04-01 | End: 2022-04-02

## 2022-04-01 RX ORDER — KETOROLAC TROMETHAMINE 30 MG/ML
30 INJECTION, SOLUTION INTRAMUSCULAR; INTRAVENOUS
Status: DISPENSED | OUTPATIENT
Start: 2022-04-01 | End: 2022-04-02

## 2022-04-01 RX ORDER — ACETAMINOPHEN 325 MG/1
650 TABLET ORAL EVERY 6 HOURS
Status: DISPENSED | OUTPATIENT
Start: 2022-04-01 | End: 2022-04-02

## 2022-04-01 RX ORDER — OXYCODONE AND ACETAMINOPHEN 7.5; 325 MG/1; MG/1
2 TABLET ORAL
Status: DISCONTINUED | OUTPATIENT
Start: 2022-04-01 | End: 2022-04-01

## 2022-04-01 RX ORDER — SIMETHICONE 80 MG
80 TABLET,CHEWABLE ORAL
Status: DISCONTINUED | OUTPATIENT
Start: 2022-04-01 | End: 2022-04-04 | Stop reason: HOSPADM

## 2022-04-01 RX ORDER — OXYCODONE AND ACETAMINOPHEN 7.5; 325 MG/1; MG/1
1 TABLET ORAL
Status: DISCONTINUED | OUTPATIENT
Start: 2022-04-01 | End: 2022-04-01

## 2022-04-01 RX ORDER — DIPHENHYDRAMINE HCL 25 MG
25 CAPSULE ORAL
Status: DISCONTINUED | OUTPATIENT
Start: 2022-04-01 | End: 2022-04-01

## 2022-04-01 RX ORDER — ONDANSETRON 2 MG/ML
INJECTION INTRAMUSCULAR; INTRAVENOUS AS NEEDED
Status: DISCONTINUED | OUTPATIENT
Start: 2022-04-01 | End: 2022-04-01 | Stop reason: HOSPADM

## 2022-04-01 RX ORDER — NALBUPHINE HYDROCHLORIDE 10 MG/ML
5 INJECTION, SOLUTION INTRAMUSCULAR; INTRAVENOUS; SUBCUTANEOUS
Status: DISPENSED | OUTPATIENT
Start: 2022-04-01 | End: 2022-04-02

## 2022-04-01 RX ORDER — OXYCODONE AND ACETAMINOPHEN 7.5; 325 MG/1; MG/1
2 TABLET ORAL
Status: DISCONTINUED | OUTPATIENT
Start: 2022-04-02 | End: 2022-04-04 | Stop reason: HOSPADM

## 2022-04-01 RX ORDER — ONDANSETRON 2 MG/ML
4 INJECTION INTRAMUSCULAR; INTRAVENOUS
Status: ACTIVE | OUTPATIENT
Start: 2022-04-01 | End: 2022-04-02

## 2022-04-01 RX ADMIN — Medication 16 MILLI-UNITS/MIN: at 00:00

## 2022-04-01 RX ADMIN — SODIUM CHLORIDE, SODIUM LACTATE, POTASSIUM CHLORIDE, CALCIUM CHLORIDE, AND DEXTROSE MONOHYDRATE 125 ML/HR: 600; 310; 30; 20; 5 INJECTION, SOLUTION INTRAVENOUS at 02:18

## 2022-04-01 RX ADMIN — SODIUM CHLORIDE, SODIUM LACTATE, POTASSIUM CHLORIDE, AND CALCIUM CHLORIDE: 600; 310; 30; 20 INJECTION, SOLUTION INTRAVENOUS at 04:43

## 2022-04-01 RX ADMIN — PHENYLEPHRINE HYDROCHLORIDE 320 MCG: 10 INJECTION INTRAVENOUS at 05:07

## 2022-04-01 RX ADMIN — NALBUPHINE HYDROCHLORIDE 5 MG: 10 INJECTION, SOLUTION INTRAMUSCULAR; INTRAVENOUS; SUBCUTANEOUS at 10:38

## 2022-04-01 RX ADMIN — ACETAMINOPHEN 650 MG: 325 TABLET, FILM COATED ORAL at 17:18

## 2022-04-01 RX ADMIN — MORPHINE SULFATE 4 MG: 0.5 INJECTION, SOLUTION EPIDURAL; INTRATHECAL; INTRAVENOUS at 05:24

## 2022-04-01 RX ADMIN — SODIUM CHLORIDE, SODIUM LACTATE, POTASSIUM CHLORIDE, AND CALCIUM CHLORIDE 150 ML/HR: 600; 310; 30; 20 INJECTION, SOLUTION INTRAVENOUS at 07:20

## 2022-04-01 RX ADMIN — OXYCODONE 10 MG: 5 TABLET ORAL at 15:53

## 2022-04-01 RX ADMIN — KETOROLAC TROMETHAMINE 30 MG: 30 INJECTION, SOLUTION INTRAMUSCULAR; INTRAVENOUS at 05:28

## 2022-04-01 RX ADMIN — SODIUM CHLORIDE, PRESERVATIVE FREE 10 ML: 5 INJECTION INTRAVENOUS at 15:54

## 2022-04-01 RX ADMIN — KETOROLAC TROMETHAMINE 30 MG: 30 INJECTION, SOLUTION INTRAMUSCULAR at 13:46

## 2022-04-01 RX ADMIN — ONDANSETRON 4 MG: 2 INJECTION INTRAMUSCULAR; INTRAVENOUS at 05:07

## 2022-04-01 RX ADMIN — CEFAZOLIN SODIUM 1 G: 1 INJECTION, POWDER, FOR SOLUTION INTRAMUSCULAR; INTRAVENOUS at 04:31

## 2022-04-01 RX ADMIN — SIMETHICONE 80 MG: 80 TABLET, CHEWABLE ORAL at 07:15

## 2022-04-01 RX ADMIN — Medication 12 MILLI-UNITS/MIN: at 02:09

## 2022-04-01 RX ADMIN — PHENYLEPHRINE HYDROCHLORIDE 160 MCG: 10 INJECTION INTRAVENOUS at 04:59

## 2022-04-01 RX ADMIN — SIMETHICONE 80 MG: 80 TABLET, CHEWABLE ORAL at 13:45

## 2022-04-01 RX ADMIN — OXYCODONE 10 MG: 5 TABLET ORAL at 21:36

## 2022-04-01 RX ADMIN — Medication 18 MILLI-UNITS/MIN: at 00:30

## 2022-04-01 RX ADMIN — DIPHENHYDRAMINE HYDROCHLORIDE 12.5 MG: 50 INJECTION, SOLUTION INTRAMUSCULAR; INTRAVENOUS at 07:39

## 2022-04-01 RX ADMIN — ACETAMINOPHEN 650 MG: 325 TABLET, FILM COATED ORAL at 10:32

## 2022-04-01 RX ADMIN — LIDOCAINE HYDROCHLORIDE,EPINEPHRINE BITARTRATE 15 ML: 20; .005 INJECTION, SOLUTION EPIDURAL; INFILTRATION; INTRACAUDAL; PERINEURAL at 04:40

## 2022-04-01 RX ADMIN — KETOROLAC TROMETHAMINE 30 MG: 30 INJECTION, SOLUTION INTRAMUSCULAR at 20:36

## 2022-04-01 RX ADMIN — PHENYLEPHRINE HYDROCHLORIDE 160 MCG: 10 INJECTION INTRAVENOUS at 04:49

## 2022-04-01 RX ADMIN — OXYCODONE HYDROCHLORIDE AND ACETAMINOPHEN 1 TABLET: 7.5; 325 TABLET ORAL at 07:15

## 2022-04-01 RX ADMIN — FENTANYL CITRATE 100 MCG: 50 INJECTION INTRAMUSCULAR; INTRAVENOUS at 02:21

## 2022-04-01 RX ADMIN — PHENYLEPHRINE HYDROCHLORIDE 320 MCG: 10 INJECTION INTRAVENOUS at 05:20

## 2022-04-01 RX ADMIN — PHENYLEPHRINE HYDROCHLORIDE 160 MCG: 10 INJECTION INTRAVENOUS at 05:28

## 2022-04-01 RX ADMIN — DOCUSATE SODIUM 100 MG: 100 CAPSULE ORAL at 10:32

## 2022-04-01 RX ADMIN — SODIUM CITRATE AND CITRIC ACID MONOHYDRATE 30 ML: 500; 334 SOLUTION ORAL at 04:28

## 2022-04-01 RX ADMIN — SIMETHICONE 80 MG: 80 TABLET, CHEWABLE ORAL at 17:19

## 2022-04-01 RX ADMIN — PHENYLEPHRINE HYDROCHLORIDE 160 MCG: 10 INJECTION INTRAVENOUS at 05:04

## 2022-04-01 RX ADMIN — NALBUPHINE HYDROCHLORIDE 5 MG: 10 INJECTION, SOLUTION INTRAMUSCULAR; INTRAVENOUS; SUBCUTANEOUS at 20:36

## 2022-04-01 RX ADMIN — DOCUSATE SODIUM 100 MG: 100 CAPSULE ORAL at 17:19

## 2022-04-01 RX ADMIN — PHENYLEPHRINE HYDROCHLORIDE 320 MCG: 10 INJECTION INTRAVENOUS at 05:16

## 2022-04-01 RX ADMIN — DIPHENHYDRAMINE HYDROCHLORIDE 12.5 MG: 50 INJECTION, SOLUTION INTRAMUSCULAR; INTRAVENOUS at 21:36

## 2022-04-01 RX ADMIN — Medication 500 ML/HR: at 05:04

## 2022-04-01 RX ADMIN — LIDOCAINE HYDROCHLORIDE,EPINEPHRINE BITARTRATE 3 ML: 20; .005 INJECTION, SOLUTION EPIDURAL; INFILTRATION; INTRACAUDAL; PERINEURAL at 04:44

## 2022-04-01 RX ADMIN — PHENYLEPHRINE HYDROCHLORIDE 160 MCG: 10 INJECTION INTRAVENOUS at 04:54

## 2022-04-01 RX ADMIN — ROPIVACAINE HYDROCHLORIDE 6 ML: 5 INJECTION, SOLUTION EPIDURAL; INFILTRATION; PERINEURAL at 02:23

## 2022-04-01 RX ADMIN — PHENYLEPHRINE HYDROCHLORIDE 320 MCG: 10 INJECTION INTRAVENOUS at 05:11

## 2022-04-01 NOTE — PROGRESS NOTES
SBAR IN Report: Mother    Verbal report received from Star Mendoza RN (full name & credentials) on this patient, who is now being transferred from R (unit) for routine post - op. The patient is wearing a green \"Anesthesia-Duramorph\" band. Report consisted of patient's Situation, Background, Assessment and Recommendations (SBAR). [place] ID bands were compared with the identification form, and verified with the patient and transferring nurse. Information from the SBAR, OR Summary, Procedure Summary, Intake/Output, MAR and Recent Results and the [place] Report was reviewed with the transferring nurse; opportunity for questions and clarification provided.

## 2022-04-01 NOTE — PROGRESS NOTES
SBAR OUT Report: Mother    Verbal report given to Jhon Sanders RN (full name & credentials) on this patient, who is now being transferred to MIU (unit) for routine post - op. The patient is not wearing a green \"Anesthesia-Duramorph\" band. Report consisted of patient's Situation, Background, Assessment and Recommendations (SBAR). [place] ID bands were compared with the identification form, and verified with the patient and receiving nurse. Information from the SBAR and the [facility] Report was reviewed with the receiving nurse; opportunity for questions and clarification provided.

## 2022-04-01 NOTE — LACTATION NOTE
This note was copied from a baby's chart. First visit with breastfeeding mom. Discussed feeding baby on cue. Opening mouth, turning head from side to side, bringing hands to mouth and sucking movements are all early hunger cues. Crying is a late hunger cue. Do lots of skin to skin to help recognize early feeding cues. If baby does not nurse, continue skin to skin and offer again later. On average newborns eat at least 8 or more times per day without restriction. Cluster feeding is normal.  Reviewed positioning techniques and signs of a good latch. Discussed holding baby so that ear, shoulder and hip are in a straight line. Baby is held close and nose lines up with mom's nipple. Discussed supporting baby's neck and mom's breast.  When baby opens wide bring baby quickly onto the breast.  Try for an assymetrical latch with nipple pointed towards the roof of baby's mouth. Mouth should be wide and lips flanged around the breast.  Encouraged to nurse on the first breast until baby finishes that side. If baby comes off the breast quickly, you can re-offer that same side to ensure good stimulation before moving baby to next side. Offer the second breast, baby can take the second breast as long as desired. It is ok if they do not take the second side when offered. Listen and look for swallows. Once milk is in over the next few days, swallowing will become more frequent and obvious. If baby pausing on the breast longer than 10 seconds, remind baby to nurse. Attempt to burp between breasts and after feeding. Rotate which breast the baby starts on. Discussed expected output based on age. Discussed feed on demand. If necessary rouse for feedings at least until above birth weight. Reviewed Breastfeeding Packet and encouraged mom to write down feedings and output at least until first Ped visit.   In accordance with the 7088 AAP Policy Statement on Breastfeeding, Mothers of healthy term  infants should be delay pacifier use until breastfeeding is well-established, usually about 3 to 4 wk after birth. Pacifiers are not available on the Mother Infant Unit. Artificial nipples should also be avoided until breastfeeding is well established.

## 2022-04-01 NOTE — PROGRESS NOTES
Rn at bedside when FHR decel occurred at [address] [place]. Pt repositioned to lateral right.

## 2022-04-01 NOTE — PROGRESS NOTES
Dr. Kenyatta Chin at bedside to discuss plan of care with patient. Orders received to start pitocin.

## 2022-04-01 NOTE — LACTATION NOTE
This note was copied from a baby's chart. In to see mom and infant for the first time. Mom stated that infant has latched and nursed well twice since birth. Reviewed the expectations of the first 24 hours as well as the second night of life. Mom had questions in regards to pumping as well as introduction of the bottle. Lactation consultant will follow up tomorrow.

## 2022-04-01 NOTE — OP NOTES
Negar Chong  [id number]      INTRAUTERINE PREGNANCY  SECTION FULL OP NOTE        DATE OF PROCEDURE:  2022    PREOPERATIVE DIAGNOSIS:  39 weeks, arrest of dilation    POSTOPERATIVE DIAGNOSIS:  same    ADDITIONAL DIAGNOSES: viable female , , [id number]    PROCEDURE: Low transverse  section    SURGEON:  Giana Juarez MD    ASSISTANT:  none    ANESTHESIA: Epidural    EBL: 7482 cc    COMPLICATIONS: none    OPERATIVE PROCEDURE: Patient was placed on the operating room table in the supine position/left lateral tilt. Time out was done to confirm the operating procedure, surgeon, patient and site. Once confirmed by the team, procedure was started. After having adequate regional anesthesia by spinal injection, the patient was prepped and draped in the usual fashion for abdominal surgery. A Pfannenstiel incision was made and carried down sharply through the skin, subcutaneous tissue, and fascia. Fascia was sharply dissected free from underlying rectus muscles. The peritoneum was sharply entered and extended vertically. DeLee bladder blade was then placed over the bladder. The visceroperitoneal reflection over the lower uterine segment was incised transversely and the bladder flap sharply and bluntly developed. The uterus was incised transversely, then extended bluntly, and the infants head was delivered without difficulty and fundal pressure. Fluid was clear. Cord was clamped and cut. Mouth and nose were suctioned clean. The infant was given to the NICU personnel present at the time of delivery. The placenta was expressed, intact on inspection. The uterus was exteriorized, wrapped in a wet lap square, curetted with a dry square, and closed in a double-layered fashion with #1 chromic. Hemostasis appeared adequate. The cul-de-sac was then irrigated and suctioned clean. The uterus was placed in the abdomen. The gutters were irrigated and suctioned clean.  The peritoneum and rectus muscles were closed with 2-0 chromic. The fascia was closed with 0 vicryl. Running 2-0 plain was used to reapproximate the subcutaneous tissue. 4-0 Vicryl was used in a subcuticular stitch. The patient tolerated the procedure well and went to the recovery room in satisfactory condition.

## 2022-04-01 NOTE — L&D DELIVERY NOTE
Delivery Summary    Patient: Christen Carr MRN: [id number]  SSN: [id number]    YOB: 1985  Age: 31 y.o.   Sex: female        Information for the patient's :  Aggie Raymond [[id number]]       Labor Events:    Labor: No    Steroids:     Cervical Ripening Date/Time: 3/30/2022 12:02 AM   Cervical Ripening Type: Misoprostol   Antibiotics During Labor:     Rupture Identifier:      Rupture Date/Time: 3/31/2022 3:00 PM   Rupture Type: AROM   Amniotic Fluid Volume:      Amniotic Fluid Description: Clear    Amniotic Fluid Odor:      Induction:         Induction Date/Time:        Indications for Induction:      Augmentation: None   Augmentation Date/Time:      Indications for Augmentation:     Labor complications: Failure to Progress in First Stage       Additional complications:        Delivery Events:  Indications For Episiotomy:     Episiotomy:     Perineal Laceration(s):     Repaired:     Periurethral Laceration Location:      Repaired:     Labial Laceration Location:     Repaired:     Sulcal Laceration Location:     Repaired:     Vaginal Laceration Location:     Repaired:     Cervical Laceration Location:     Repaired:     Repair Suture:     Number of Repair Packets:     Estimated Blood Loss (ml):  ml   Quantitaive Blood Loss (ml):             Delivery Date: 2022    Delivery Time: 5:02 AM   Delivery Type: , Low Transverse     Details    Trial of Labor: Yes   Primary/Repeat: Primary   Priority: Routine   Indications:  Failure to Progress       Sex:  Female     Gestational Age: 39w1d  Delivery Clinician:  Alexsander Lin  Living Status: Living   Delivery Location: OR            APGARS  One minute Five minutes Ten minutes   Skin color: 1   1        Heart rate: 2   2        Grimace: 2   2        Muscle tone: 2   2        Breathin   2        Totals: 9   9          Presentation: Vertex    Position:   Occiput Posterior  Resuscitation Method: Suctioning-bulb; Tactile Stimulation     Meconium Stained:        Cord Information: 3 Vessels  Complications: Nuchal Cord Without Compressions  Cord around: head  Delayed cord clamping? Yes  Cord clamped date/time:   Disposition of Cord Blood: Lab    Blood Gases Sent?: Yes    Placenta:  Date/Time: 2022  5:03 AM  Removal: Manual Removal      Appearance: Normal      Measurements:  Birth Weight: 7 lb 7.9 oz (3.4 kg)      Birth Length: 1' 8.87\" (0.53 m)      Head Circumference: 1' 1.58\" (0.345 m)      Chest Circumference: 1' 0.8\" (0.325 m)     Abdominal Girth: Other Providers:   HUBER FINE;CORIE COWAN;JENNIFER CASEY;KIMANI ETIENNE;BO MENDOSA;VITALIY HNUTER;KENN LOTT;MICHAEL SEYMOUR, Obstetrician;Primary Nurse; Anesthesiologist;Scrub Tech;Charge Nurse;Respiratory Therapist;Neonatologist;Scrub Tech             Group B Strep: No results found for: Cathy Muñoz  Information for the patient's :  Amaris Amador [[id number]]     Lab Results   Component Value Date/Time    ABO/Rh(D) O POSITIVE 2022 05:02 AM    MARISOL IgG NEG 2022 05:02 AM      Recent Labs     22  0514 22  0513   PCO2CB 37 42   PO2CB 26 22   HCO3I  --  21.2*   SO2I  --  32.0*   IBD 4.3 4.8   SPECTI VENOUS CORD ARTERIAL CORD   PHICB 7.36 7.32

## 2022-04-01 NOTE — ANESTHESIA POSTPROCEDURE EVALUATION
Procedure(s):   SECTION.     epidural    Anesthesia Post Evaluation        Patient location during evaluation: floor  Patient participation: complete - patient participated  Level of consciousness: awake and alert  Pain management: adequate  Airway patency: patent  Anesthetic complications: no  Cardiovascular status: acceptable  Respiratory status: acceptable  Hydration status: acceptable  Post anesthesia nausea and vomiting:  controlled  Final Post Anesthesia Temperature Assessment:  Normothermia (36.0-37.5 degrees C)      INITIAL Post-op Vital signs:   Vitals Value Taken Time   /58 22 0620   Temp 36.7 °C (98.1 °F) 22 0548   Pulse 103 22 0620   Resp 18 2220   SpO2 99 % 22

## 2022-04-01 NOTE — PROGRESS NOTES
Patient now with minimal change in SVE despite 6 hours of adequate UC's. Factual information regarding the medical condition and the need for  section was discussed with the patient and the patient's , who verbalized understanding. The therapeutic rationale, benefits, risks and potential complications were discussed, including the possibility of pain, bleeding, infection, loss of function, disfigurement, death or other unforeseen complications. Alternatives to the procedure were discussed (including potential risks, complications and benefits of each). No guarantee was expressed or implied as to the results of the procedure. Informed consent was given, as well as a request to proceed.

## 2022-04-01 NOTE — PROGRESS NOTES
Up to bathroom with assistance. Jermaine taught and demonstrated back. Linens changed. Pt ambulating without difficulty.

## 2022-04-02 LAB
BASOPHILS # BLD: 0 K/UL (ref 0–0.2)
BASOPHILS NFR BLD: 0 % (ref 0–2)
BLOOD BANK CMNT PATIENT-IMP: NORMAL
DIFFERENTIAL METHOD BLD: ABNORMAL
EOSINOPHIL # BLD: 0.1 K/UL (ref 0–0.8)
EOSINOPHIL NFR BLD: 1 % (ref 0.5–7.8)
ERYTHROCYTE [DISTWIDTH] IN BLOOD BY AUTOMATED COUNT: 14 % (ref 11.9–14.6)
FETAL SCREEN,FMHS: NORMAL
HCT VFR BLD AUTO: 19.1 % (ref 35.8–46.3)
HCT VFR BLD AUTO: 22.6 % (ref 35.8–46.3)
HGB BLD-MCNC: 6.1 G/DL (ref 11.7–15.4)
HGB BLD-MCNC: 7.4 G/DL (ref 11.7–15.4)
HISTORY CHECKED?,CKHIST: NORMAL
HISTORY CHECKED?,CKHIST: NORMAL
IMM GRANULOCYTES # BLD AUTO: 0.2 K/UL (ref 0–0.5)
IMM GRANULOCYTES NFR BLD AUTO: 1 % (ref 0–5)
LYMPHOCYTES # BLD: 2.1 K/UL (ref 0.5–4.6)
LYMPHOCYTES NFR BLD: 13 % (ref 13–44)
MCH RBC QN AUTO: 29.5 PG (ref 26.1–32.9)
MCHC RBC AUTO-ENTMCNC: 31.9 G/DL (ref 31.4–35)
MCV RBC AUTO: 92.3 FL (ref 79.6–97.8)
MONOCYTES # BLD: 1.2 K/UL (ref 0.1–1.3)
MONOCYTES NFR BLD: 7 % (ref 4–12)
NEUTS SEG # BLD: 12.2 K/UL (ref 1.7–8.2)
NEUTS SEG NFR BLD: 78 % (ref 43–78)
NRBC # BLD: 0 K/UL (ref 0–0.2)
PLATELET # BLD AUTO: 200 K/UL (ref 150–450)
PMV BLD AUTO: 9.1 FL (ref 9.4–12.3)
RBC # BLD AUTO: 2.07 M/UL (ref 4.05–5.2)
WBC # BLD AUTO: 15.7 K/UL (ref 4.3–11.1)

## 2022-04-02 PROCEDURE — P9016 RBC LEUKOCYTES REDUCED: HCPCS

## 2022-04-02 PROCEDURE — 36415 COLL VENOUS BLD VENIPUNCTURE: CPT

## 2022-04-02 PROCEDURE — 85461 HEMOGLOBIN FETAL: CPT

## 2022-04-02 PROCEDURE — 85025 COMPLETE CBC W/AUTO DIFF WBC: CPT

## 2022-04-02 PROCEDURE — 36430 TRANSFUSION BLD/BLD COMPNT: CPT

## 2022-04-02 PROCEDURE — 74011250637 HC RX REV CODE- 250/637: Performed by: ANESTHESIOLOGY

## 2022-04-02 PROCEDURE — 74011250637 HC RX REV CODE- 250/637: Performed by: OBSTETRICS & GYNECOLOGY

## 2022-04-02 PROCEDURE — 2709999900 HC NON-CHARGEABLE SUPPLY

## 2022-04-02 PROCEDURE — 85018 HEMOGLOBIN: CPT

## 2022-04-02 PROCEDURE — 65270000029 HC RM PRIVATE

## 2022-04-02 RX ORDER — SODIUM CHLORIDE 9 MG/ML
250 INJECTION, SOLUTION INTRAVENOUS AS NEEDED
Status: DISCONTINUED | OUTPATIENT
Start: 2022-04-02 | End: 2022-04-04 | Stop reason: HOSPADM

## 2022-04-02 RX ORDER — ACETAMINOPHEN 325 MG/1
650 TABLET ORAL
Status: DISCONTINUED | OUTPATIENT
Start: 2022-04-02 | End: 2022-04-04 | Stop reason: HOSPADM

## 2022-04-02 RX ORDER — DIPHENHYDRAMINE HCL 25 MG
25 CAPSULE ORAL
Status: DISCONTINUED | OUTPATIENT
Start: 2022-04-02 | End: 2022-04-04 | Stop reason: HOSPADM

## 2022-04-02 RX ADMIN — IBUPROFEN 800 MG: 800 TABLET, FILM COATED ORAL at 06:06

## 2022-04-02 RX ADMIN — OXYCODONE AND ACETAMINOPHEN 2 TABLET: 7.5; 325 TABLET ORAL at 07:38

## 2022-04-02 RX ADMIN — SIMETHICONE 80 MG: 80 TABLET, CHEWABLE ORAL at 18:35

## 2022-04-02 RX ADMIN — DOCUSATE SODIUM 100 MG: 100 CAPSULE ORAL at 18:34

## 2022-04-02 RX ADMIN — OXYCODONE AND ACETAMINOPHEN 2 TABLET: 7.5; 325 TABLET ORAL at 22:44

## 2022-04-02 RX ADMIN — ACETAMINOPHEN 650 MG: 325 TABLET, FILM COATED ORAL at 00:11

## 2022-04-02 RX ADMIN — ACETAMINOPHEN 650 MG: 325 TABLET, FILM COATED ORAL at 09:52

## 2022-04-02 RX ADMIN — SIMETHICONE 80 MG: 80 TABLET, CHEWABLE ORAL at 12:41

## 2022-04-02 RX ADMIN — SIMETHICONE 80 MG: 80 TABLET, CHEWABLE ORAL at 07:38

## 2022-04-02 RX ADMIN — SIMETHICONE 80 MG: 80 TABLET, CHEWABLE ORAL at 00:11

## 2022-04-02 RX ADMIN — OXYCODONE AND ACETAMINOPHEN 2 TABLET: 7.5; 325 TABLET ORAL at 13:48

## 2022-04-02 RX ADMIN — DIPHENHYDRAMINE HYDROCHLORIDE 25 MG: 25 CAPSULE ORAL at 03:48

## 2022-04-02 RX ADMIN — IBUPROFEN 800 MG: 800 TABLET, FILM COATED ORAL at 19:19

## 2022-04-02 RX ADMIN — OXYCODONE AND ACETAMINOPHEN 2 TABLET: 7.5; 325 TABLET ORAL at 03:48

## 2022-04-02 RX ADMIN — OXYCODONE AND ACETAMINOPHEN 2 TABLET: 7.5; 325 TABLET ORAL at 18:34

## 2022-04-02 RX ADMIN — IBUPROFEN 800 MG: 800 TABLET, FILM COATED ORAL at 12:39

## 2022-04-02 RX ADMIN — DOCUSATE SODIUM 100 MG: 100 CAPSULE ORAL at 07:38

## 2022-04-02 RX ADMIN — SIMETHICONE 80 MG: 80 TABLET, CHEWABLE ORAL at 22:44

## 2022-04-02 RX ADMIN — DIPHENHYDRAMINE HYDROCHLORIDE 25 MG: 25 CAPSULE ORAL at 09:52

## 2022-04-02 NOTE — PROGRESS NOTES
Post-Operative Day Number 1 Progress Note    Patient doing well post-op day 1 from  delivery without significant complaints. Pain controlled on current medication. Voiding without difficulty, normal lochia. SHe does have HA and very tired. Vitals:  Patient Vitals for the past 8 hrs:   BP Pulse SpO2   22 0630 (!) 108/58 (!) 105 98 %     Temp (24hrs), Av.9 °F (36.6 °C), Min:97.7 °F (36.5 °C), Max:98.1 °F (36.7 °C)      Vital signs stable, afebrile. Exam:  Patient without distress. Abdomen soft, fundus firm at level of umbilicus, non tender. Mild distention no rebound or guarding               Incision dry and clean without erythema. Lower extremities are negative for swelling, cords or tenderness. Lab/Data Review:  CBC:   Lab Results   Component Value Date/Time    WBC 15.7 (H) 2022 07:12 AM    HGB 6.1 (LL) 2022 07:12 AM    HCT 19.1 (L) 2022 07:12 AM     2022 07:12 AM       Assessment and Plan:  Patient appears to be symptomatic from her anemia. Discussed and will transfuse. I have discussed with the patient the rationale for blood component transfusion; its benefits in treating or preventing fatigue, organ damage, or death; and its risk which includes mild transfusion reactions, rare risk of blood borne infection, or more serious but rare reactions. I have discussed the alternatives to transfusion, including the risk and consequences of not receiving transfusion. The patient had an opportunity to ask questions and had agreed to proceed with transfusion of blood components. Ashly Dan

## 2022-04-02 NOTE — PROGRESS NOTES
Anesthesiology  Post-op Note    Post-op day 1 s/p  via epidural with neuraxial opioids for post-op pain management. Visit Vitals  /67 (BP 1 Location: Right upper arm, BP Patient Position: Supine)   Pulse 95   Temp 36.7 °C (98.1 °F)   Resp 17   LMP 2021   SpO2 97%   Breastfeeding Unknown     Airway patent, patient appropriately hydrated and appears euvolemic. Patient is Alert and oriented. Pain is well controlled. Pruritus is well controlled. Nausea is well controlled. No complaints about back or site of injection. Motor and sensory function has returned to baseline in lower extremities. Patient is satisfied with anesthetic and reports no complications. Continue current orders, then initiate surgeon's orders for pain management 24 hours after . Follow up per surgeon.

## 2022-04-02 NOTE — PROGRESS NOTES
Received critical hgb of 5.9 from [place] in the lab. Perfectserve sent to Dr. Stormy Pierce at 7900. Orders received to type and cross match 2 units. MD will discuss with pt on morning rounds.

## 2022-04-03 LAB
ABO + RH BLD: NORMAL
BASOPHILS # BLD: 0 K/UL (ref 0–0.2)
BASOPHILS NFR BLD: 0 % (ref 0–2)
BLD PROD TYP BPU: NORMAL
BLOOD GROUP ANTIBODIES SERPL: NORMAL
BPU ID: NORMAL
CROSSMATCH RESULT,%XM: NORMAL
DIFFERENTIAL METHOD BLD: ABNORMAL
EOSINOPHIL # BLD: 0.1 K/UL (ref 0–0.8)
EOSINOPHIL NFR BLD: 1 % (ref 0.5–7.8)
ERYTHROCYTE [DISTWIDTH] IN BLOOD BY AUTOMATED COUNT: 14.1 % (ref 11.9–14.6)
HCT VFR BLD AUTO: 21.1 % (ref 35.8–46.3)
HCT VFR BLD AUTO: 27.1 % (ref 35.8–46.3)
HGB BLD-MCNC: 7 G/DL (ref 11.7–15.4)
HGB BLD-MCNC: 8.9 G/DL (ref 11.7–15.4)
HISTORY CHECKED?,CKHIST: NORMAL
IMM GRANULOCYTES # BLD AUTO: 0.2 K/UL (ref 0–0.5)
IMM GRANULOCYTES NFR BLD AUTO: 2 % (ref 0–5)
LYMPHOCYTES # BLD: 1.4 K/UL (ref 0.5–4.6)
LYMPHOCYTES NFR BLD: 13 % (ref 13–44)
MCH RBC QN AUTO: 30.3 PG (ref 26.1–32.9)
MCHC RBC AUTO-ENTMCNC: 32.8 G/DL (ref 31.4–35)
MCV RBC AUTO: 92.2 FL (ref 79.6–97.8)
MONOCYTES # BLD: 0.5 K/UL (ref 0.1–1.3)
MONOCYTES NFR BLD: 4 % (ref 4–12)
NEUTS SEG # BLD: 8.6 K/UL (ref 1.7–8.2)
NEUTS SEG NFR BLD: 80 % (ref 43–78)
NRBC # BLD: 0.06 K/UL (ref 0–0.2)
PLATELET # BLD AUTO: 212 K/UL (ref 150–450)
PMV BLD AUTO: 8.6 FL (ref 9.4–12.3)
RBC # BLD AUTO: 2.94 M/UL (ref 4.05–5.2)
SPECIMEN EXP DATE BLD: NORMAL
STATUS OF UNIT,%ST: NORMAL
UNIT DIVISION, %UDIV: 0
WBC # BLD AUTO: 10.7 K/UL (ref 4.3–11.1)

## 2022-04-03 PROCEDURE — P9016 RBC LEUKOCYTES REDUCED: HCPCS

## 2022-04-03 PROCEDURE — 36430 TRANSFUSION BLD/BLD COMPNT: CPT

## 2022-04-03 PROCEDURE — 74011250637 HC RX REV CODE- 250/637: Performed by: OBSTETRICS & GYNECOLOGY

## 2022-04-03 PROCEDURE — 2709999900 HC NON-CHARGEABLE SUPPLY

## 2022-04-03 PROCEDURE — 86923 COMPATIBILITY TEST ELECTRIC: CPT

## 2022-04-03 PROCEDURE — 65270000029 HC RM PRIVATE

## 2022-04-03 PROCEDURE — 74011250636 HC RX REV CODE- 250/636: Performed by: OBSTETRICS & GYNECOLOGY

## 2022-04-03 PROCEDURE — 86900 BLOOD TYPING SEROLOGIC ABO: CPT

## 2022-04-03 PROCEDURE — 85018 HEMOGLOBIN: CPT

## 2022-04-03 PROCEDURE — 36415 COLL VENOUS BLD VENIPUNCTURE: CPT

## 2022-04-03 PROCEDURE — 85025 COMPLETE CBC W/AUTO DIFF WBC: CPT

## 2022-04-03 RX ORDER — ACETAMINOPHEN 325 MG/1
650 TABLET ORAL
Status: DISCONTINUED | OUTPATIENT
Start: 2022-04-03 | End: 2022-04-04 | Stop reason: HOSPADM

## 2022-04-03 RX ORDER — SODIUM CHLORIDE 9 MG/ML
250 INJECTION, SOLUTION INTRAVENOUS AS NEEDED
Status: DISCONTINUED | OUTPATIENT
Start: 2022-04-03 | End: 2022-04-04 | Stop reason: HOSPADM

## 2022-04-03 RX ADMIN — OXYCODONE AND ACETAMINOPHEN 2 TABLET: 7.5; 325 TABLET ORAL at 04:24

## 2022-04-03 RX ADMIN — IBUPROFEN 800 MG: 800 TABLET, FILM COATED ORAL at 14:45

## 2022-04-03 RX ADMIN — OXYCODONE AND ACETAMINOPHEN 2 TABLET: 7.5; 325 TABLET ORAL at 08:55

## 2022-04-03 RX ADMIN — OXYCODONE AND ACETAMINOPHEN 2 TABLET: 7.5; 325 TABLET ORAL at 21:30

## 2022-04-03 RX ADMIN — DOCUSATE SODIUM 100 MG: 100 CAPSULE ORAL at 08:55

## 2022-04-03 RX ADMIN — DOCUSATE SODIUM 100 MG: 100 CAPSULE ORAL at 20:33

## 2022-04-03 RX ADMIN — SIMETHICONE 80 MG: 80 TABLET, CHEWABLE ORAL at 08:55

## 2022-04-03 RX ADMIN — DIPHENHYDRAMINE HYDROCHLORIDE 25 MG: 25 CAPSULE ORAL at 16:59

## 2022-04-03 RX ADMIN — SIMETHICONE 80 MG: 80 TABLET, CHEWABLE ORAL at 20:33

## 2022-04-03 RX ADMIN — RHO(D) IMMUNE GLOBULIN (HUMAN) 0.3 MG: 1500 SOLUTION INTRAMUSCULAR at 21:32

## 2022-04-03 RX ADMIN — DIPHENHYDRAMINE HYDROCHLORIDE 25 MG: 25 CAPSULE ORAL at 11:02

## 2022-04-03 RX ADMIN — IBUPROFEN 800 MG: 800 TABLET, FILM COATED ORAL at 01:10

## 2022-04-03 RX ADMIN — IBUPROFEN 800 MG: 800 TABLET, FILM COATED ORAL at 20:33

## 2022-04-03 RX ADMIN — PROMETHAZINE HYDROCHLORIDE 25 MG: 25 TABLET ORAL at 08:55

## 2022-04-03 RX ADMIN — ACETAMINOPHEN 650 MG: 325 TABLET, FILM COATED ORAL at 11:01

## 2022-04-03 RX ADMIN — OXYCODONE AND ACETAMINOPHEN 2 TABLET: 7.5; 325 TABLET ORAL at 16:59

## 2022-04-03 RX ADMIN — PROMETHAZINE HYDROCHLORIDE 25 MG: 25 TABLET ORAL at 02:20

## 2022-04-03 NOTE — PROGRESS NOTES
Patient complains of feeling cold, shaky, and lower back pain. Dr. Sonia Dale notified. Orders received to administer 25 mg benadryl PO and labs ordered (CBC and Hgb/HCT). Lab notified.

## 2022-04-03 NOTE — LACTATION NOTE
This note was copied from a baby's chart. In to see mom and infant for feeding observation. Mom has not been feeling well today but doing alittle better at the moment. Was able to get mom sitting up well and baby positioned and latched on in football hold. Took about 10 minutes to get baby on, but baby eventually latched on to left breast well and fed 15 minutes w/ consistent stimulation and nutritive sucking. Burped infant and mom fed baby on other breast well. Mom has pump at bedside to use if baby not feeding well or to also pump after feeds tonight if baby hits below 10% wt loss as well. Encouraged her to be very proactive in breast feeding infant as baby already at 8% wt loss. Reviewed importance of 8-12 full feeds per day, monitor output. Mom further needs at this time.  Lactation to follow up in am.

## 2022-04-03 NOTE — LACTATION NOTE
This note was copied from a baby's chart. In to see mom and infant for follow up. Mom getting blood transfusion today and states is very tired. Pt has hard time keeping her eyes open while talking to her. Mom recently fed baby so will come back at help mom at next feeding. Encouraged her to be very proactive w/ breast feeding today and if not baby not latching and feeding well, she needs to be diligently pumping so baby can be fed, anjelica as down 8% wt loss. RN taught mom to pump already today had got 17 mls first session. Did reviewed normal pump volumes first week of life per day. RN showed her how to feed back to infant.

## 2022-04-03 NOTE — PROGRESS NOTES
Post-Operative Day Number 2 Progress Note    Patient doing well post-op day 2 from  delivery without significant complaints. Pain controlled on current medication. Voiding without difficulty, normal lochia. Still feels tired and HA and weak when walking. Has not slept much since admission. Passing flatus and tolerated Chic andrea a     Vitals:  Patient Vitals for the past 8 hrs:   BP Temp Pulse Resp SpO2   22 0745 125/70 98.1 °F (36.7 °C) 97 16 97 %   22 0229 127/83  86 18 100 %     Temp (24hrs), Av.2 °F (36.8 °C), Min:97.9 °F (36.6 °C), Max:98.4 °F (36.9 °C)      Vital signs stable, afebrile. Exam:  Patient without distress. Abdomen soft, fundus firm at level of umbilicus, non tender. Incision dry and clean without erythema. Lower extremities are negative for swelling, cords or tenderness. Lab/Data Review:  CBC:   Lab Results   Component Value Date/Time    HGB 7.0 (L) 2022 06:16 AM    HCT 21.1 (L) 2022 06:16 AM       Assessment and Plan:  Patient appears to be having uncomplicated post- course. Continue routine post-op care and maternal education. Still symptomatic will transfuse 2nd unit PRBC's. Discussed with patient.

## 2022-04-03 NOTE — LACTATION NOTE
This note was copied from a baby's chart. In to follow up with mom and infant. Mom stated that infant has been sleepy a lot today but when infant latches and nurses that she has cramping and gets sleepy after feeds. She also said that she has struggled more today to get infant to latch. Reviewed with her the recommendation of delaying introduction of the pacifier. Also reviewed second night of life. Assisted mom with attempt to latch infant. Attempted first on the left in both cradle and football hold. Infant would latch take a few sucks and come off. We then attempted the right breast and after a few attempts infant latched and sucked rhythmically for 10 minutes and came off the breast content. Lactation consultant to follow up tomorrow.

## 2022-04-03 NOTE — PROGRESS NOTES
Patient ambulating in the [place], pushing bassinet. Visitor walking with patient. Patient states she feels better when walking. Denies needs. Instructed to call prn.

## 2022-04-04 VITALS
TEMPERATURE: 98.1 F | RESPIRATION RATE: 16 BRPM | HEART RATE: 86 BPM | OXYGEN SATURATION: 98 % | SYSTOLIC BLOOD PRESSURE: 121 MMHG | DIASTOLIC BLOOD PRESSURE: 58 MMHG

## 2022-04-04 LAB
ABO + RH BLD: NORMAL
BLD PROD TYP BPU: NORMAL
BLOOD GROUP ANTIBODIES SERPL: NORMAL
BPU ID: NORMAL
CROSSMATCH RESULT,%XM: NORMAL
SPECIMEN EXP DATE BLD: NORMAL
STATUS OF UNIT,%ST: NORMAL
UNIT DIVISION, %UDIV: 0

## 2022-04-04 PROCEDURE — 74011250637 HC RX REV CODE- 250/637: Performed by: OBSTETRICS & GYNECOLOGY

## 2022-04-04 PROCEDURE — 2709999900 HC NON-CHARGEABLE SUPPLY

## 2022-04-04 PROCEDURE — 74011250636 HC RX REV CODE- 250/636: Performed by: OBSTETRICS & GYNECOLOGY

## 2022-04-04 PROCEDURE — 90715 TDAP VACCINE 7 YRS/> IM: CPT | Performed by: OBSTETRICS & GYNECOLOGY

## 2022-04-04 RX ORDER — QUINIDINE GLUCONATE 324 MG
240 TABLET, EXTENDED RELEASE ORAL
Qty: 90 TABLET | Refills: 0 | Status: SHIPPED | OUTPATIENT
Start: 2022-04-04 | End: 2022-05-11

## 2022-04-04 RX ORDER — IBUPROFEN 600 MG/1
600 TABLET ORAL
Qty: 40 TABLET | Refills: 0 | Status: SHIPPED | OUTPATIENT
Start: 2022-04-04 | End: 2022-05-11

## 2022-04-04 RX ORDER — OXYCODONE AND ACETAMINOPHEN 7.5; 325 MG/1; MG/1
1 TABLET ORAL
Qty: 20 TABLET | Refills: 0 | Status: SHIPPED | OUTPATIENT
Start: 2022-04-04 | End: 2022-04-09

## 2022-04-04 RX ADMIN — SIMETHICONE 80 MG: 80 TABLET, CHEWABLE ORAL at 11:37

## 2022-04-04 RX ADMIN — TETANUS TOXOID, REDUCED DIPHTHERIA TOXOID AND ACELLULAR PERTUSSIS VACCINE, ADSORBED 0.5 ML: 5; 2.5; 8; 8; 2.5 SUSPENSION INTRAMUSCULAR at 11:37

## 2022-04-04 RX ADMIN — IBUPROFEN 800 MG: 800 TABLET, FILM COATED ORAL at 08:44

## 2022-04-04 RX ADMIN — IBUPROFEN 800 MG: 800 TABLET, FILM COATED ORAL at 02:18

## 2022-04-04 RX ADMIN — DOCUSATE SODIUM 100 MG: 100 CAPSULE ORAL at 08:44

## 2022-04-04 RX ADMIN — OXYCODONE AND ACETAMINOPHEN 2 TABLET: 7.5; 325 TABLET ORAL at 05:12

## 2022-04-04 RX ADMIN — SIMETHICONE 80 MG: 80 TABLET, CHEWABLE ORAL at 08:44

## 2022-04-04 RX ADMIN — OXYCODONE AND ACETAMINOPHEN 2 TABLET: 7.5; 325 TABLET ORAL at 10:50

## 2022-04-04 RX ADMIN — OXYCODONE AND ACETAMINOPHEN 1 TABLET: 7.5; 325 TABLET ORAL at 15:09

## 2022-04-04 NOTE — LACTATION NOTE
This note was copied from a baby's chart. Individualized Feeding Plan for Breastfeeding   Lactation Services [phone]      As much as possible, hold your baby on your chest so babys bare skin is against your bare skin with a blanket covering babys back, especially 30 minutes before it is time for baby to eat. Watch for early feeding cues such as, licking lips, sucking motions, rooting, hands to mouth. Crying is a late feeding cue. Feed your baby at least 8 times in 24 hours, or more if your baby is showing feeding cues. If baby is sleepy put baby skin to skin and watch for hunger cues. To rouse baby: unwrap, undress, massage hands, feet, & back, change diaper, gently change babys position from lying to sitting. 15-20 minutes on the first breast of active breastfeeding is considered a good feeding. Good, active breastfeeding is when baby is alert, tugging the nipple, their ear may move, and you can hear swallows. Allow baby to finish the first side before changing sides. Sleeping at the breast or only brief, light sucks should not be considered a good, full breastfeed. At each feeding:  __x__1. Do Suck Practice on finger before each feeding until sucking pattern is smooth. Try using index finger. Nail down towards tongue. __x__2. Hand Express for a few minutes prior to latching to help start milk flow. __x__3. Baby needs to NURSE WELL x 15-20 minutes on at least first breast, burp and offer 2nd breast at every feeding. If no sustained latch only attempt at breast for 10 minutes. Following latch attempt or breastfeeding: AT EVERY FEEDING PUMP and FEED BACK PUMPED MILK PLUS FORMULA:    __x__4. Double pump for 15 minutes with breast massage and compression. Hand express for an additional 2-3 minutes per side. Pump after each feeding attempt or poor feeding, up to 8 times per day. If you are not putting baby to the breast you need to pump 8 times a day.  Pump every 3 hours.    __x__5. Give baby all of the breast milk you obtain from pumping and then supplement formula to ensure intake goals are met. Follow chart below. AVERAGE INTAKES OF COLOSTRUM BY HEALTHY  INFANTS:  Time  Day Intake (ml per feeding)  Based on 8 feedings per day. 1st 24 hrs  1 2-10 ml  24-48 hrs  2 5-15 ml  48-72 hrs  3 15-30 ml (0.5-1 oz) amount per feeding  72-96 hrs  4 30-45 ml (1-1.5oz)                          5-6       45-60 ml (1.5-2oz)                           7          60-75ml (2-2.5oz)    By day 7, baby will need 60-75 ml or 2-2.5 oz at each feeding based on 8 feedings per day & babys weight. (1oz = 30ml). Total milk volume needed in 24 hours by Day 7 is 18-20 oz per day based on baby's birthweight of 7-8. The more often baby eats, the less volume they need per feeding. If baby is eating more often than the minimum of 8 times per day, they may take less per feeding. Comments: If pumping, suggest using olive oil or coconut oil on your nipples before pumping to help reduce the friction. Use feeding plan until follow up with pediatrician. Triple feeding plan needed  Supplement formula at all feeds as needed. OUTPATIENT APPOINTMENT Suggested. Outpatient services are located on the 4th floor at [facility], [place]. Check in at the 4th floor registration desk (the same one you used when you came to have your baby).   Call for questions [phone]

## 2022-04-04 NOTE — DISCHARGE SUMMARY
Post-Operative Day Number 3 Progress/Discharge Note    Patient doing well post-op day 3 from  delivery without significant complaints. Pain controlled on current medication. Voiding without difficulty, normal lochia. Vitals:  Patient Vitals for the past 8 hrs:   BP Temp Pulse Resp SpO2   22 0741 (!) 140/87 98.1 °F (36.7 °C) 92 18 98 %   22 0336 136/83 98.1 °F (36.7 °C) (!) 112 19 97 %     Temp (24hrs), Av °F (36.7 °C), Min:97.3 °F (36.3 °C), Max:98.4 °F (36.9 °C)      Vital signs stable, afebrile. Exam:  Patient without distress. Abdomen soft, fundus firm at level of umbilicus, non tender. Incision dry and                      clean without erythema. Lower extremities are negative for swelling, cords or tenderness. Lab/Data Review:  CBC:   Lab Results   Component Value Date/Time    WBC 10.7 2022 05:09 PM    HGB 8.9 (L) 2022 05:09 PM    HCT 27.1 (L) 2022 05:09 PM     2022 05:09 PM       Assessment and Plan:  Patient appears to be having uncomplicated post- course. Continue routine post-op care and maternal education. Plan discharge for today with follow up in our office in 1-2 weeks.

## 2022-04-04 NOTE — LACTATION NOTE
This note was copied from a baby's chart. Lactation visit. In to check on feeds. Weight down greater than 10%, supplementing formula given weight loss. Baby also with high intermediate risk bilirubin of 14.0 this morning. Will have weight check and bilirubin check with MD tomorrow. Reviewed feeding plan. Mom has not been pumping consistently. Mom reports she will likely formula feed only today, this evening at home and Dad will formula feed infant only. Discussed supply and demand. Need to stimulate breasts for successful milk production. At least breastfeed or pump every 3 hours for best result. Feeding plan given and reviewed. Discussed intake needs. Increase to 30ml per feed today. Baby now 67 hours old. Mom agreeable. Questions answered.

## 2022-04-04 NOTE — DISCHARGE INSTRUCTIONS
Section: What to Expect at Home    Your Recovery:  A  section, or , is surgery to deliver your baby through a cut, called an incision that the doctor makes in your lower belly and uterus. You may have some pain in your lower belly and need pain medicine for 1 to 2 weeks. You can expect some vaginal bleeding for several weeks. You will probably need about 6 weeks to fully recover. It is important to take it easy while the incision is healing. Avoid heavy lifting, strenuous activities, or exercises that strain the belly muscles while you are recovering. Ask a family member or friend for help with housework, cooking, and shopping. This care sheet gives you a general idea about how long it will take for you to recover. But each person recovers at a different pace. Follow the steps below to get better as quickly as possible. How can you care for yourself at home? Activity  · Rest when you feel tired. Getting enough sleep will help you recover. · Try to walk each day. Start by walking a little more than you did the day before. Bit by bit, increase the amount you walk. Walking boosts blood flow and helps prevent pneumonia, constipation, and blood clots. · Avoid strenuous activities, such as bicycle riding, jogging, weightlifting, and aerobic exercise, for 6 weeks or until your doctor says it is okay. · Until your doctor says it is okay, do not lift anything heavier than your baby. · Do not do sit-ups or other exercise that strain the belly muscles for 6 weeks or until your doctor says it is okay. · Hold a pillow over your incision when you cough or take deep breaths. This will support your belly and decrease your pain. · You may shower as usual. Pat the incision dry when you are done. · You will have some vaginal bleeding. Wear sanitary pads. Do not douche or use tampons until your doctor says it is okay. · Ask your doctor when you can drive again.   · You will probably need to take at least 6 weeks off work. It depends on the type of work you do and how you feel. · Wait until you are healed (about 4 to 6 weeks) before you have sexual intercourse. Your doctor will tell you when it is okay to have sex. · Talk to your doctor about birth control. You can get pregnant even before your period returns. Also, you can get pregnant while you are breast-feeding. Incision care  Your skin is your body's first line of defense against germs, but an incision site leaves an easy way for germs to enter your body. To prevent infection:  · Clean your hands frequently and before and after changing any touching any dressings. · If you have strips of tape on the incision, leave the tape on for a week or until it falls off. · Look at your incision closely every day for any changes. Contact your doctor if you experience any signs of infection, such as fever or increased redness at the surgical site. · Wash the area daily with warm, soapy water, and pat it dry. Don't use hydrogen peroxide or alcohol, which can slow healing. You may cover the area with a gauze bandage if it weeps or rubs against clothing. Change the bandage every day. · Keep the area clean and dry. Diet  · You can eat your normal diet. If your stomach is upset, try bland, low-fat foods like plain rice, broiled chicken, toast, and yogurt. · Drink plenty of fluids (unless your doctor tells you not to). · You may notice that your bowel movements are not regular right after your surgery. This is common. Try to avoid constipation and straining with bowel movements. You may want to take a fiber supplement every day. If you have not had a bowel movement after a couple of days, ask your doctor about taking a mild laxative. · If you are breast-feeding, do not drink any alcohol. Medicines  · Take pain medicines exactly as directed. · If the doctor gave you a prescription medicine for pain, take it as prescribed.   · If you are not taking a prescription pain medicine, ask your doctor if you can take an over-the-counter medicine such as acetaminophen (Tylenol), ibuprofen (Advil, Motrin), or naproxen (Aleve), for cramps. Read and follow all instructions on the label. Do not take aspirin, because it can cause more bleeding. Do not take acetaminophen (Tylenol) and other acetaminophen containing medications (i.e. Percocet) at the same time. · If you think your pain medicine is making you sick to your stomach:  · Take your medicine after meals (unless your doctor has told you not to). · Ask your doctor for a different pain medicine. · If your doctor prescribed antibiotics, take them as directed. Do not stop taking them just because you feel better. You need to take the full course of antibiotics. Mental Health  · Many women get the \"baby blues\" during the first few days after childbirth. You may lose sleep, feel irritable, and cry easily. You may feel happy one minute and sad the next. Hormone changes are one cause of these emotional changes. Also, the demands of a new baby, along with visits from relatives or other family needs, add to a mother's stress. The \"baby blues\" often peak around the fourth day. Then they ease up in less than 2 weeks. · If your moodiness or anxiety lasts for more than 2 weeks, or if you feel like life is not worth living, you may have postpartum depression. This is different for each mother. Some mothers with serious depression may worry intensely about their infant's well-being. Others may feel distant from their child. Some mothers might even feel that they might harm their baby. A mother may have signs of paranoia, wondering if someone is watching her. · With all the changes in your life, you may not know if you are depressed. Pregnancy sometimes causes changes in how you feel that are similar to the symptoms of depression.   · Symptoms of depression include:  · Feeling sad or hopeless and losing interest in daily activities. These are the most common symptoms of depression. · Sleeping too much or not enough. · Feeling tired. You may feel as if you have no energy. · Eating too much or too little. · [employer] ([employer]) offers a Warm line; Chat with the Expert phone sessions; Information and Articles about Pregnancy and Postpartum Mood Disorders; Comprehensive List of Free Support Groups; Knowledgeable local coordinators who will offer support, information, and resources; Guide to Resources on Dinda[employer]com.br; Calendar of events in the  mood disorders community; Latest News and Research; and [facility] [address] for [employer]. Remember - You are not alone; You are not to blame; With help, you will be well. 0-256-145-PPD(7721). WWW. POSTPART[employer]. NET   · Writing or talking about death, such as writing suicide notes or talking about guns, knives, or pills. Keep the numbers for these national suicide hotlines: 6-238-043-TALK (8-915.215.1129) and 0-250-JDDRGIU (1-564.983.4478). If you or someone you know talks about suicide or feeling hopeless, get help right away. Other instructions  · If you breast-feed your baby, you may be more comfortable while you are healing if you place the baby so that he or she is not resting on your belly. Try tucking your baby under your arm, with his or her body along the side you will be feeding on. Support your baby's upper body with your arm. With that hand you can control your baby's head to bring his or her mouth to your breast. This is sometimes called the football hold. Follow-up care is a key part of your treatment and safety. Be sure to make and go to all appointments, and call your doctor if you are having problems. It's also a good idea to know your test results and keep a list of the medicines you take. When should you call for help? Call 911 anytime you think you may need emergency care.  For example, call if:  · You are thinking of hurting yourself, your baby, or anyone else. · You passed out (lost consciousness). · You have symptoms of a blood clot in your lung (called a pulmonary embolism). These may include:  · Sudden chest pain. · Trouble breathing. · Coughing up blood. Call your doctor now or seek immediate medical care if:    · You have severe vaginal bleeding. · You are soaking through a pad each hour for 2 or more hours. · Your vaginal bleeding seems to be getting heavier or is still bright red 4 days after delivery. · You are dizzy or lightheaded, or you feel like you may faint. · You are vomiting or cannot keep fluids down. · You have a fever. · You have new or more belly pain. · You have loose stitches, or your incision comes open. · You have symptoms of infection, such as:  · Increased pain, swelling, warmth, or redness. · Red streaks leading from the incision. · Pus draining from the incision. · A fever  · You pass tissue (not just blood). · Your vaginal discharge smells bad. · Your belly feels tender or full and hard. · Your breasts are continuously painful or red. · You feel sad, anxious, or hopeless for more than a few days. · You have sudden, severe pain in your belly. · You have symptoms of a blood clot in your leg (called a deep vein thrombosis), such as:  · Pain in your calf, back of the knee, thigh, or groin. · Redness and swelling in your leg or groin. · You have symptoms of preeclampsia, such as:  · Sudden swelling of your face, hands, or feet. · New vision problems (such as dimness or blurring). · A severe headache. · Your blood pressure is higher than it should be or rises suddenly. · You have new nausea or vomiting. Watch closely for changes in your health, and be sure to contact your doctor if you have any problems. DISCHARGE SUMMARY from Nurse      What to do at Home:      Discharge instruction to follow:    Activity: Pelvis rest for 6 weeks, nothing in vagina for 6 weeks     No heavy lifting over 15 lbs for 2 weeks     No driving for 2 weeks, No driving if taking narcotics     No push/pull motion such as sweeping or vacuuming for 2 weeks     No tub baths or swimming for 6 weeks     section keep incision clean and dry, may shower as normal with soap and water. Inspect incision every day for signs of infection listed below. Continue to use erik-bottle with every void or bowel movement until comfortable stopping. Change sanitary pad after each urination or bowel movement. Call MD for the following:      Fever over 101 F; pain not relieved by medication; foul smelling vaginal discharge or increase in vaginal bleeding. Redness, swelling, or drainage from  incision. Call MD if experiencing  thoughts of suicide or homicide, symptoms of postpartum depression or mastitis, or any other major medical concerns. Take medication as prescribed. Follow up with MD as order. *  Please give a list of your current medications to your Primary Care Provider. *  Please update this list whenever your medications are discontinued, doses are      changed, or new medications (including over-the-counter products) are added. *  Please carry medication information at all times in case of emergency situations. These are general instructions for a healthy lifestyle:    No smoking/ No tobacco products/ Avoid exposure to second hand smoke  Surgeon General's Warning:  Quitting smoking now greatly reduces serious risk to your health.     Obesity, smoking, and sedentary lifestyle greatly increases your risk for illness    A healthy diet, regular physical exercise & weight monitoring are important for maintaining a healthy lifestyle    You may be retaining fluid if you have a history of heart failure or if you experience any of the following symptoms:  Weight gain of 3 pounds or more overnight or 5 pounds in a week, increased swelling in our hands or feet or shortness of breath while lying flat in bed. Please call your doctor as soon as you notice any of these symptoms; do not wait until your next office visit. The discharge information has been reviewed with the patient. The patient verbalized understanding. Discharge medications reviewed with the patient and appropriate educational materials and side effects teaching were provided.   ___________________________________________________________________________________________________________________________________

## 2022-04-04 NOTE — PROGRESS NOTES
Problem: Patient Education: Go to Patient Education Activity  Goal: Patient/Family Education  Outcome: Progressing Towards Goal     Problem:  Delivery: Postpartum Day 3  Goal: Off Pathway (Use only if patient is Off Pathway)  Outcome: Progressing Towards Goal  Goal: Activity/Safety  Outcome: Progressing Towards Goal  Goal: Consults, if ordered  Outcome: Progressing Towards Goal  Goal: Nutrition/Diet  Outcome: Progressing Towards Goal  Goal: Discharge Planning  Outcome: Progressing Towards Goal  Goal: Medications  Outcome: Progressing Towards Goal  Goal: Treatments/Interventions/Procedures  Outcome: Progressing Towards Goal  Goal: Psychosocial  Outcome: Progressing Towards Goal     Problem:  Delivery: Discharge Outcomes  Goal: *Follow-up appointments as indicated  Outcome: Progressing Towards Goal  Goal: *Describes available resources and support systems  Outcome: Progressing Towards Goal  Goal: *No signs and symptoms of infection  Outcome: Progressing Towards Goal  Goal: *Birth certificate information completed  Outcome: Progressing Towards Goal  Goal: *Received and verbalizes understanding of discharge plan and instructions  Outcome: Progressing Towards Goal  Goal: *Vital signs within defined limits  Outcome: Progressing Towards Goal  Goal: *Labs within defined limits  Outcome: Progressing Towards Goal  Goal: *Hemodynamically stable  Outcome: Progressing Towards Goal  Goal: *Optimal pain control at patient's stated goal  Outcome: Progressing Towards Goal  Goal: *Participates in infant care  Outcome: Progressing Towards Goal  Goal: *Demonstrates progressive activity  Outcome: Progressing Towards Goal  Goal: *Appropriate parent-infant bonding  Outcome: Progressing Towards Goal  Goal: *Tolerating diet  Outcome: Progressing Towards Goal  Goal: *Verbalizes name, dosage, time, side effects, and number of days to continue medications  Outcome: Progressing Towards Goal  Goal: *Influenza vaccine administered (October-March)  Outcome: Progressing Towards Goal     Problem: Falls - Risk of  Goal: *Absence of Falls  Description: Document Cindia Neigh Fall Risk and appropriate interventions in the flowsheet.   Outcome: Progressing Towards Goal  Note: Fall Risk Interventions:  Mobility Interventions: Patient to call before getting OOB         Medication Interventions: Teach patient to arise slowly                   Problem: Patient Education: Go to Patient Education Activity  Goal: Patient/Family Education  Outcome: Progressing Towards Goal

## 2022-04-04 NOTE — PROGRESS NOTES
Patient discharged to home per MD orders. Discharge instructions reviewed with patient. Questions encouraged and answered. Patient verbalizes understanding. Patient to call when ready to be escorted out.

## 2022-04-04 NOTE — PROGRESS NOTES
SW familiar with patient due to ongoing interactions during pregnancy. Discussed how patient is coping with need for a c/s. Emotional support/encouragement provided to patient.  provided education on [facility] program.  Phone # to schedule appointment: [phone]. Patient given informational packet on  mood & anxiety disorders (resources/education). Family denies any additional needs from  at this time. Family has 's contact information should any needs/questions arise.     Keily Mcdaniel, SMITH-CHAVEZ, [address] [facility]   [phone]

## 2022-04-06 ENCOUNTER — HOSPITAL ENCOUNTER (EMERGENCY)
Age: 37
Discharge: HOME | End: 2022-04-07
Attending: OBSTETRICS & GYNECOLOGY | Admitting: OBSTETRICS & GYNECOLOGY
Payer: COMMERCIAL

## 2022-04-06 ENCOUNTER — APPOINTMENT (OUTPATIENT)
Dept: CT IMAGING | Age: 37
End: 2022-04-06
Attending: EMERGENCY MEDICINE
Payer: COMMERCIAL

## 2022-04-06 ENCOUNTER — APPOINTMENT (OUTPATIENT)
Dept: GENERAL RADIOLOGY | Age: 37
End: 2022-04-06
Attending: EMERGENCY MEDICINE
Payer: COMMERCIAL

## 2022-04-06 DIAGNOSIS — R03.0 ELEVATED BLOOD PRESSURE READING: ICD-10-CM

## 2022-04-06 DIAGNOSIS — E83.42 HYPOMAGNESEMIA: ICD-10-CM

## 2022-04-06 LAB
ALBUMIN SERPL-MCNC: 2.1 G/DL (ref 3.5–5)
ALBUMIN/GLOB SERPL: 0.6 {RATIO} (ref 1.2–3.5)
ALP SERPL-CCNC: 222 U/L (ref 50–136)
ALT SERPL-CCNC: 29 U/L (ref 12–65)
ANION GAP SERPL CALC-SCNC: 8 MMOL/L (ref 7–16)
AST SERPL-CCNC: 40 U/L (ref 15–37)
BASOPHILS # BLD: 0 K/UL (ref 0–0.2)
BASOPHILS NFR BLD: 0 % (ref 0–2)
BILIRUB DIRECT SERPL-MCNC: 0.1 MG/DL
BILIRUB SERPL-MCNC: 0.4 MG/DL (ref 0.2–1.1)
BUN SERPL-MCNC: 12 MG/DL (ref 6–23)
CALCIUM SERPL-MCNC: 8.5 MG/DL (ref 8.3–10.4)
CHLORIDE SERPL-SCNC: 109 MMOL/L (ref 98–107)
CO2 SERPL-SCNC: 24 MMOL/L (ref 21–32)
CREAT SERPL-MCNC: 0.62 MG/DL (ref 0.6–1)
CREAT UR-MCNC: 22 MG/DL
DIFFERENTIAL METHOD BLD: ABNORMAL
EOSINOPHIL # BLD: 0.2 K/UL (ref 0–0.8)
EOSINOPHIL NFR BLD: 2 % (ref 0.5–7.8)
ERYTHROCYTE [DISTWIDTH] IN BLOOD BY AUTOMATED COUNT: 14.6 % (ref 11.9–14.6)
GLOBULIN SER CALC-MCNC: 3.6 G/DL (ref 2.3–3.5)
GLUCOSE SERPL-MCNC: 76 MG/DL (ref 65–100)
GLUCOSE, GLUUPC: NEGATIVE
HCT VFR BLD AUTO: 26.9 % (ref 35.8–46.3)
HGB BLD-MCNC: 8.8 G/DL (ref 11.7–15.4)
IMM GRANULOCYTES # BLD AUTO: 0.5 K/UL (ref 0–0.5)
IMM GRANULOCYTES NFR BLD AUTO: 5 % (ref 0–5)
KETONES UR-MCNC: NEGATIVE MG/DL
LIPASE SERPL-CCNC: 43 U/L (ref 73–393)
LYMPHOCYTES # BLD: 2.4 K/UL (ref 0.5–4.6)
LYMPHOCYTES NFR BLD: 26 % (ref 13–44)
MAGNESIUM SERPL-MCNC: 1.6 MG/DL (ref 1.8–2.4)
MCH RBC QN AUTO: 30.3 PG (ref 26.1–32.9)
MCHC RBC AUTO-ENTMCNC: 32.7 G/DL (ref 31.4–35)
MCV RBC AUTO: 92.8 FL (ref 79.6–97.8)
MONOCYTES # BLD: 0.8 K/UL (ref 0.1–1.3)
MONOCYTES NFR BLD: 8 % (ref 4–12)
NEUTS SEG # BLD: 5.6 K/UL (ref 1.7–8.2)
NEUTS SEG NFR BLD: 59 % (ref 43–78)
NRBC # BLD: 0.22 K/UL (ref 0–0.2)
PLATELET # BLD AUTO: 261 K/UL (ref 150–450)
PMV BLD AUTO: 8.8 FL (ref 9.4–12.3)
POTASSIUM SERPL-SCNC: 4.1 MMOL/L (ref 3.5–5.1)
PROT SERPL-MCNC: 5.7 G/DL (ref 6.3–8.2)
PROT UR QL: NEGATIVE
PROT UR-MCNC: 5 MG/DL
PROT/CREAT UR-RTO: 0.2
RBC # BLD AUTO: 2.9 M/UL (ref 4.05–5.2)
SODIUM SERPL-SCNC: 141 MMOL/L (ref 136–145)
TROPONIN-HIGH SENSITIVITY: 18.1 PG/ML (ref 0–14)
URATE SERPL-MCNC: 3.3 MG/DL (ref 2.6–6)
WBC # BLD AUTO: 9.4 K/UL (ref 4.3–11.1)

## 2022-04-06 PROCEDURE — 83690 ASSAY OF LIPASE: CPT

## 2022-04-06 PROCEDURE — 81002 URINALYSIS NONAUTO W/O SCOPE: CPT | Performed by: OBSTETRICS & GYNECOLOGY

## 2022-04-06 PROCEDURE — 99285 EMERGENCY DEPT VISIT HI MDM: CPT

## 2022-04-06 PROCEDURE — 81003 URINALYSIS AUTO W/O SCOPE: CPT

## 2022-04-06 PROCEDURE — 99282 EMERGENCY DEPT VISIT SF MDM: CPT

## 2022-04-06 PROCEDURE — 70450 CT HEAD/BRAIN W/O DYE: CPT

## 2022-04-06 PROCEDURE — 71046 X-RAY EXAM CHEST 2 VIEWS: CPT

## 2022-04-06 PROCEDURE — 85025 COMPLETE CBC W/AUTO DIFF WBC: CPT

## 2022-04-06 PROCEDURE — 82248 BILIRUBIN DIRECT: CPT

## 2022-04-06 PROCEDURE — 84156 ASSAY OF PROTEIN URINE: CPT

## 2022-04-06 PROCEDURE — 84550 ASSAY OF BLOOD/URIC ACID: CPT

## 2022-04-06 PROCEDURE — 80053 COMPREHEN METABOLIC PANEL: CPT

## 2022-04-06 PROCEDURE — 74011000250 HC RX REV CODE- 250: Performed by: EMERGENCY MEDICINE

## 2022-04-06 PROCEDURE — 83735 ASSAY OF MAGNESIUM: CPT

## 2022-04-06 PROCEDURE — 74011250636 HC RX REV CODE- 250/636: Performed by: EMERGENCY MEDICINE

## 2022-04-06 PROCEDURE — 93005 ELECTROCARDIOGRAM TRACING: CPT | Performed by: EMERGENCY MEDICINE

## 2022-04-06 PROCEDURE — 84484 ASSAY OF TROPONIN QUANT: CPT

## 2022-04-06 PROCEDURE — 96375 TX/PRO/DX INJ NEW DRUG ADDON: CPT

## 2022-04-06 RX ORDER — HYDRALAZINE HYDROCHLORIDE 20 MG/ML
10 INJECTION INTRAMUSCULAR; INTRAVENOUS
Status: DISCONTINUED | OUTPATIENT
Start: 2022-04-06 | End: 2022-04-06

## 2022-04-06 RX ORDER — LABETALOL HYDROCHLORIDE 5 MG/ML
20 INJECTION, SOLUTION INTRAVENOUS
Status: DISCONTINUED | OUTPATIENT
Start: 2022-04-06 | End: 2022-04-06

## 2022-04-06 RX ORDER — SODIUM CHLORIDE 0.9 % (FLUSH) 0.9 %
5-40 SYRINGE (ML) INJECTION EVERY 8 HOURS
Status: DISCONTINUED | OUTPATIENT
Start: 2022-04-06 | End: 2022-04-07 | Stop reason: HOSPADM

## 2022-04-06 RX ORDER — LABETALOL HYDROCHLORIDE 5 MG/ML
80 INJECTION, SOLUTION INTRAVENOUS
Status: DISCONTINUED | OUTPATIENT
Start: 2022-04-06 | End: 2022-04-06

## 2022-04-06 RX ORDER — SODIUM CHLORIDE 0.9 % (FLUSH) 0.9 %
5-10 SYRINGE (ML) INJECTION EVERY 8 HOURS
Status: DISCONTINUED | OUTPATIENT
Start: 2022-04-06 | End: 2022-04-07 | Stop reason: HOSPADM

## 2022-04-06 RX ORDER — SODIUM CHLORIDE 0.9 % (FLUSH) 0.9 %
5-40 SYRINGE (ML) INJECTION AS NEEDED
Status: DISCONTINUED | OUTPATIENT
Start: 2022-04-06 | End: 2022-04-06

## 2022-04-06 RX ORDER — SODIUM CHLORIDE 0.9 % (FLUSH) 0.9 %
5-10 SYRINGE (ML) INJECTION AS NEEDED
Status: DISCONTINUED | OUTPATIENT
Start: 2022-04-06 | End: 2022-04-06

## 2022-04-06 RX ORDER — SODIUM CHLORIDE, SODIUM LACTATE, POTASSIUM CHLORIDE, CALCIUM CHLORIDE 600; 310; 30; 20 MG/100ML; MG/100ML; MG/100ML; MG/100ML
125 INJECTION, SOLUTION INTRAVENOUS CONTINUOUS
Status: DISCONTINUED | OUTPATIENT
Start: 2022-04-06 | End: 2022-04-06

## 2022-04-06 RX ORDER — LABETALOL HYDROCHLORIDE 5 MG/ML
40 INJECTION, SOLUTION INTRAVENOUS
Status: DISCONTINUED | OUTPATIENT
Start: 2022-04-06 | End: 2022-04-06

## 2022-04-06 RX ORDER — HYDROMORPHONE HYDROCHLORIDE 1 MG/ML
0.5 INJECTION, SOLUTION INTRAMUSCULAR; INTRAVENOUS; SUBCUTANEOUS ONCE
Status: COMPLETED | OUTPATIENT
Start: 2022-04-06 | End: 2022-04-06

## 2022-04-06 RX ADMIN — PROCHLORPERAZINE EDISYLATE 10 MG: 5 INJECTION INTRAMUSCULAR; INTRAVENOUS at 23:52

## 2022-04-06 RX ADMIN — SODIUM CHLORIDE, SODIUM LACTATE, POTASSIUM CHLORIDE, AND CALCIUM CHLORIDE 1000 ML: 600; 310; 30; 20 INJECTION, SOLUTION INTRAVENOUS at 23:52

## 2022-04-06 RX ADMIN — HYDROMORPHONE HYDROCHLORIDE 0.5 MG: 1 INJECTION, SOLUTION INTRAMUSCULAR; INTRAVENOUS; SUBCUTANEOUS at 23:52

## 2022-04-07 VITALS
SYSTOLIC BLOOD PRESSURE: 153 MMHG | DIASTOLIC BLOOD PRESSURE: 74 MMHG | HEART RATE: 72 BPM | RESPIRATION RATE: 16 BRPM | OXYGEN SATURATION: 96 % | TEMPERATURE: 99.2 F | WEIGHT: 170 LBS | HEIGHT: 63 IN | BODY MASS INDEX: 30.12 KG/M2

## 2022-04-07 LAB
ATRIAL RATE: 73 BPM
CALCULATED P AXIS, ECG09: 58 DEGREES
CALCULATED R AXIS, ECG10: 67 DEGREES
CALCULATED T AXIS, ECG11: 64 DEGREES
DIAGNOSIS, 93000: NORMAL
P-R INTERVAL, ECG05: 144 MS
Q-T INTERVAL, ECG07: 352 MS
QRS DURATION, ECG06: 84 MS
QTC CALCULATION (BEZET), ECG08: 387 MS
VENTRICULAR RATE, ECG03: 73 BPM

## 2022-04-07 PROCEDURE — 74011250636 HC RX REV CODE- 250/636: Performed by: EMERGENCY MEDICINE

## 2022-04-07 PROCEDURE — 96365 THER/PROPH/DIAG IV INF INIT: CPT

## 2022-04-07 RX ORDER — MAGNESIUM SULFATE HEPTAHYDRATE 40 MG/ML
2 INJECTION, SOLUTION INTRAVENOUS
Status: COMPLETED | OUTPATIENT
Start: 2022-04-07 | End: 2022-04-07

## 2022-04-07 RX ADMIN — MAGNESIUM SULFATE HEPTAHYDRATE 2 G: 40 INJECTION, SOLUTION INTRAVENOUS at 00:23

## 2022-04-07 NOTE — ED NOTES
I have reviewed discharge instructions with the patient and spouse. The patient and spouse verbalized understanding. Patient left ED via Discharge Method: ambulatory to Home with family. Opportunity for questions and clarification provided. Patient given 0 scripts. To continue your aftercare when you leave the hospital, you may receive an automated call from our care team to check in on how you are doing. This is a free service and part of our promise to provide the best care and service to meet your aftercare needs.  If you have questions, or wish to unsubscribe from this service please call [phone]. Thank you for Choosing our [facility] Emergency Department.

## 2022-04-07 NOTE — H&P
History and Physical                                 Patient: Alecia Olguin MRN: [id number]  SSN: [id number]    YOB: 1985  Age: 31 y.o. Sex: female            Subjective: 32 yo G 12 P3-0-10-3 presents POD 5 s/p LTCS at 39w for arrest of dilation complaining of HA and neck pain, now with acute onset CP. Patient denies visual changes, no other neurologic changes. States CHOWDARY has been present since she got her epidural during labor. Has not had pain medication since 1430. Had BM today, normal voiding, denies any heavy VB or foul discharge. No other complaints. Post operative course complicated by anemia, had mild range BP elevation prior to DC without sx of preeclampsia. Objective:     Patient Vitals for the past 12 hrs:   Temp Pulse Resp BP SpO2   22 2225 98.3 °F (36.8 °C) 74 27 (!) 158/88 100 %   22 2220  70  (!) 162/93 100 %   22 2215  74  (!) 162/85    22 2208  71  (!) 163/81        Temp (24hrs), Av.3 °F (36.8 °C), Min:98.3 °F (36.8 °C), Max:98.3 °F (36.8 °C)    Past Medical History:   Diagnosis Date    Anemia      Chlamydia 2006    Cholestasis during pregnancy in third trimester 2022    COVID-19 2022    GERD (gastroesophageal reflux disease) 10/16/2019    Gonorrhea 2006    Hyperemesis affecting pregnancy, antepartum 2021     Zofran pump orders faxed to [facility] 21- unable to reach pt 21 pt provided with [facility] contact information to get Zofran pump initiated 2021 at [facility]: Has had Zofran pump for past 2 weeks; managed by [facility]- currently 6mg over 6hr; C/o hyperemesis since 6 weeks. C/o nausea all day with vomiting 3-4 times; sometimes bile colored. Uses phenergan suppositories at night.   Pt scared to eat bec    MDD (major depressive disorder), recurrent episode ([address] 10/16/2019    Polycystic disease, ovaries      Post concussion syndrome 10/16/2019    Sickle cell trait syndrome [facility])              Past Surgical History: Procedure Laterality Date    HX DILATION AND CURETTAGE         x9   LTCS at 44 weeks 4/1/22     Social History      Occupational History    Not on file   Tobacco Use    Smoking status: Former Smoker    Smokeless tobacco: Never Used   Vaping Use    Vaping Use: Former   Substance and Sexual Activity    Alcohol use: No    Drug use: No    Sexual activity: Yes       Partners: Male       Birth control/protection: None            Family History   Problem Relation Age of Onset    Diabetes Father      Breast Problems Other      Cancer Paternal Grandfather           Prostate CA    Crohn's Disease Sister      Seizures Sister      Thyroid Disease Paternal Grandmother                 Allergies   Allergen Reactions    Reglan [Metoclopramide] Other (comments)       Jittery                 Prior to Admission medications    Medication Sig Start Date End Date Taking? Authorizing Provider   promethazine (PHENERGAN) 25 mg tablet Take 1 Tablet by mouth every six (6) hours as needed for Nausea for up to 30 doses. Indications: excessive vomiting in pregnancy  Patient not taking: Reported on 3/30/2022 3/28/22     Sam Amaya MD      Review of Systems: A comprehensive review of systems was negative except for that written in the History of Present Illness.       Physical Exam:    Constitutional: The patient appears tearful, she is alert and oriented x 3  Cardiovascular: Heart RRR, no murmurs. Respiratory: Lungs clear, no respiratory distress  GI: Abdomen soft, fundus at umbilicus, mildly tender at fundus  Incision: C/D/intact  Musculoskeletal: no cva tenderness  Upper ext: no edema, reflexes +2  Lower ext: no edema, neg clary's, reflexes +2  Skin: no rashes or lesions  Psychiatric:Mood/ Affect: appropriate  Genitourinary: deferred    Lab/Data Review: All lab results for the last 24 hours reviewed. I have personally reviewed the patient's history, prenatal record, and pertinent test results.    care everywhere, vital sign trends, radiology reports, laboratory results, and previous provider notes support my clinical impression. Assessment and Plan:     31 y.o. G12 [id number] POD# 5 from c/s: With acute onset chest pain  HA and elevated BP  Incision healing well  Abdomen non-acute. Post-operative HA with possible spinal HA etiology. Reviewed need for immediate evaluation in main ED. Patient had been instructed to be evaluated there but was brought to [facility] prior to being seen in main. Discussed with ED provider and patient to be evaluated for acute CP and HA/elevated BP. OB hypertensive order set initiated for treatment of severe range BP elevation. Differential includes postpartum preeclampsia as well as cardiac and pulmonary etiologies. She will have acute cardiac and pulmonary evaluation. D/C from [facility] currently. To main ED via wheelchair.     Signed By: Dharmesh Segura MD     April 6, 2022

## 2022-04-07 NOTE — ED PROVIDER NOTES
Patient was referred to the emergency department from the labor and delivery floor after having been seen with come multiple complaints. The patient is 4 days status post  section and is complaining of constant and persistent headache since she had her epidural as well as chest pain and left shoulder pain and generalized myalgias. She complains of her neck being sore and feeling like it swollen with pain with movement. The chest pain in the left side is sharp in nature and worse with lying down. She denies difficulty breathing or cough. She denies any fever. Additional concern from [facility] hospitalist was elevated blood pressure and the possibility of postpartum pregnancy-induced hypertension. Patient's headache is described as being posterior in origin radiating to the bitemporal and forehead as well as the face. Headache is described as being worse with standing. The history is provided by the patient and medical records. Post-Op Problem  This is a new problem. The current episode started more than 2 days ago. The problem occurs constantly. The problem has not changed since onset. Associated symptoms include chest pain, abdominal pain and headaches. Exacerbated by: Positions and movement. Nothing relieves the symptoms. Treatments tried: Patient has been taking prescription Percocet without relief. The treatment provided no relief. Past Medical History:   Diagnosis Date    Anemia     Chlamydia 2006    Cholestasis during pregnancy in third trimester 2022    COVID-19 2022    GERD (gastroesophageal reflux disease) 10/16/2019    Gonorrhea 2006    Hyperemesis affecting pregnancy, antepartum 2021    Zofran pump orders faxed to [facility] 21- unable to reach pt 21 pt provided with [facility] contact information to get Zofran pump initiated 2021 at [facility]: Has had Zofran pump for past 2 weeks; managed by [employer]- currently 6mg over 6hr; C/o hyperemesis since 6 weeks.   C/o nausea all day with vomiting 3-4 times; sometimes bile colored. Uses phenergan suppositories at night. Pt scared to eat bec    MDD (major depressive disorder), recurrent episode ([facility] [address] 10/16/2019    Polycystic disease, ovaries     Post concussion syndrome 10/16/2019    Sickle cell trait syndrome (HCC)        Past Surgical History:   Procedure Laterality Date    HX DILATION AND CURETTAGE      x9         Family History:   Problem Relation Age of Onset    Diabetes Father     Breast Problems Other     Cancer Paternal Grandfather         Prostate CA    Crohn's Disease Sister     Seizures Sister     Thyroid Disease Paternal Grandmother        Social History     Socioeconomic History    Marital status: SINGLE     Spouse name: Not on file    Number of children: Not on file    Years of education: Not on file    Highest education level: Not on file   Occupational History    Not on file   Tobacco Use    Smoking status: Former Smoker    Smokeless tobacco: Never Used   Vaping Use    Vaping Use: Former   Substance and Sexual Activity    Alcohol use: No    Drug use: No    Sexual activity: Yes     Partners: Male     Birth control/protection: None   Other Topics Concern     Service Not Asked    Blood Transfusions Not Asked    Caffeine Concern Not Asked    Occupational Exposure Not Asked    Hobby Hazards Not Asked    Sleep Concern Not Asked    Stress Concern Not Asked    Weight Concern Not Asked    Special Diet Not Asked    Back Care Not Asked    Exercise Not Asked    Bike Helmet Not Asked    Seat Belt Not Asked    Self-Exams Not Asked   Social History Narrative    Not on file     Social Determinants of Health     Financial Resource Strain:     Difficulty of Paying Living Expenses: Not on file   Food Insecurity:     Worried About Running Out of Food in the Last Year: Not on file    Beckie of Food in the Last Year: Not on file   Transportation Needs:     Lack of Transportation (Medical):  Not on file    Lack of Transportation (Non-Medical): Not on file   Physical Activity:     Days of Exercise per Week: Not on file    Minutes of Exercise per Session: Not on file   Stress:     Feeling of Stress : Not on file   Social Connections:     Frequency of Communication with Friends and Family: Not on file    Frequency of Social Gatherings with Friends and Family: Not on file    Attends [employer] Services: Not on file    Active Member of [address] [place] [address] or Organizations: Not on file    Attends Club or Organization Meetings: Not on file    Marital Status: Not on file   Intimate Partner Violence:     Fear of Current or Ex-Partner: Not on file    Emotionally Abused: Not on file    Physically Abused: Not on file    Sexually Abused: Not on file   Housing Stability:     Unable to Pay for Housing in the Last Year: Not on file    Number of [place] in the Last Year: Not on file    Unstable Housing in the Last Year: Not on file         ALLERGIES: Reglan [metoclopramide]    Review of Systems   Constitutional: Negative for chills and fever. HENT: Negative for trouble swallowing. Respiratory: Positive for chest tightness. Cardiovascular: Positive for chest pain and leg swelling. Gastrointestinal: Positive for abdominal pain. Negative for constipation, diarrhea and nausea. Musculoskeletal: Positive for arthralgias and myalgias. Neurological: Positive for dizziness and headaches. Negative for tremors, seizures, syncope, facial asymmetry, speech difficulty, weakness, light-headedness and numbness. All other systems reviewed and are negative. Vitals:    04/06/22 2215 04/06/22 2220 04/06/22 2225 04/06/22 2300   BP: (!) 162/85 (!) 162/93 (!) 158/88 (!) 156/90   Pulse: 74 70 74 70   Resp:   27 18   Temp:   98.3 °F (36.8 °C) 99.2 °F (37.3 °C)   SpO2:  100% 100% 100%   Weight:    77.1 kg (170 lb)   Height:    5' 3\" (1.6 m)            Physical Exam  Vitals and nursing note reviewed.    Constitutional: General: She is not in acute distress. Appearance: Normal appearance. She is not ill-appearing, toxic-appearing or diaphoretic. HENT:      Head: Normocephalic and atraumatic. Eyes:      Extraocular Movements: Extraocular movements intact. Conjunctiva/sclera: Conjunctivae normal.      Pupils: Pupils are equal, round, and reactive to light. Cardiovascular:      Rate and Rhythm: Normal rate and regular rhythm. Pulses: Normal pulses. Heart sounds: Normal heart sounds. Pulmonary:      Effort: Pulmonary effort is normal.      Breath sounds: Normal breath sounds. Abdominal:      General: There is no distension. Palpations: Abdomen is soft. Tenderness: There is no abdominal tenderness. There is no right CVA tenderness, left CVA tenderness, guarding or rebound. Musculoskeletal:         General: Normal range of motion. Cervical back: Normal range of motion and neck supple. Tenderness present. No rigidity. Lymphadenopathy:      Cervical: No cervical adenopathy. Skin:     General: Skin is warm and dry. Capillary Refill: Capillary refill takes less than 2 seconds. Neurological:      General: No focal deficit present. Mental Status: She is alert and oriented to person, place, and time. Mental status is at baseline. Psychiatric:         Mood and Affect: Mood normal.         Behavior: Behavior normal.         Thought Content: Thought content normal.          MDM  Number of Diagnoses or Management Options  Diagnosis management comments: Case was discussed with Dr. Jass Harris who is on-call for the OB group. Recommended assessment and evaluation by on-call anesthesiologist for possible blood patch for spinal headache. We discussed the patient's elevated blood pressure, and he was comfortable with following her up in the office tomorrow morning for that.        Amount and/or Complexity of Data Reviewed  Clinical lab tests: ordered and reviewed  Tests in the radiology section of CPT®: ordered and reviewed  Review and summarize past medical records: yes  Discuss the patient with other providers: yes  Independent visualization of images, tracings, or specimens: yes    Risk of Complications, Morbidity, and/or Mortality  Presenting problems: high  Diagnostic procedures: moderate  Management options: moderate    Patient Progress  Patient progress: stable         Procedures

## 2022-04-07 NOTE — PROGRESS NOTES
Consulted by Dr. Leilani Toscano. 30 y/o F s/p epidural placement on 3/31/22. She reports a fronto occipital headache as well as feeling tightness in her neck that started the same evening as her epidural and has been intermittent since that time. Headache is aggravated by standing and is alleviated when supine. She denies any visual changes, tinnitus, nausea/vomiting. Epidural was placed successfully with 2 attempts. She denies any LE weakness, loss bowel/bladder, or any significant back pain. Blood pressure (!) 159/74, pulse 82, temperature 37.3 °C (99.2 °F), resp. rate 18, height 5' 3\" (1.6 m), weight 77.1 kg (170 lb), last menstrual period 07/01/2021, SpO2 97 %, unknown if currently breastfeeding. Review of Systems   Constitutional: Negative. HENT: Negative for hearing loss and tinnitus. Headache   Respiratory: Negative. Cardiovascular: Negative. Gastrointestinal: Negative. Genitourinary: Negative. Musculoskeletal: Positive for myalgias and neck pain. Skin: Negative. CT Scan 4/6/22: FINDINGS: Brain volume is appropriate for age. No acute infarct, hemorrhage or  mass is identified. There is no mass effect, midline shift or depressed  fracture. The visualized paranasal sinuses and mastoid air cells are clear.     IMPRESSION  No acute process. She is tender over epidural insertion site but not significantly. The area appears normal without any erythema or fluctuance. Her motor strength is preserved in her LE. Plan: 30 y/o F s/p epidural placement on 3/31/22 and C/S on 4/1/22 with possible PDPH:    Discussed risks/benefits/alternatives of epidural blood patch vs conservative measures (fluid, caffeine, alternating tylenol/NSAID, and rest). She much prefers to attempt conservative measures at this point as she is quite nervous about an epidural blood patch.  She would like to go home and attempt conservative measures and if no significant improvement in next 2-3 days or worsening she will return to ER. Discussed with ER physician. Sammie Brittle, MD    Total time spent on this consult was 37 minutes. Over 50% of the time was spent counseling patient and coordinating care. Physician requesting consult notified of plan.

## 2022-04-07 NOTE — PROGRESS NOTES
04/06/22 2225   Vital Signs   Temp 98.3 °F (36.8 °C)   Temp Source Oral   Pulse (Heart Rate) 74   Resp Rate 27   O2 Sat (%) 100 %   BP (!) 158/88   MAP (Monitor) 117   MAP (Calculated) 111     Dr. Cristy Aiken notified of patient arrival from ED. Patient currently reporting severe chest pain and SOB. On arrival patient / 81 with 2 consecutive elevated BP taken after. Current VS above. Per MD order, transfer patient to ED to evaluate chest pain.

## 2022-04-07 NOTE — ED TRIAGE NOTES
Patient to triage after being assess in L&D. Patient delivered 4/1/22. Patient c/o her neck being sore almost to where she can not move it, lower back pain, and sharp chest pain only when she lies down.

## 2022-08-18 PROBLEM — O99.891 BACK PAIN AFFECTING PREGNANCY IN THIRD TRIMESTER: Status: ACTIVE | Noted: 2021-10-26

## 2022-08-18 PROBLEM — F32.A DEPRESSION AFFECTING PREGNANCY: Status: ACTIVE | Noted: 2019-10-16

## 2022-08-18 PROBLEM — O34.29 HX OF UTERINE SURGERY AFFECTING CURRENT PREGNANCY: Status: RESOLVED | Noted: 2021-09-09 | Resolved: 2022-08-18

## 2022-08-18 PROBLEM — O36.8390 ANTEPARTUM NON-REASSURING FETAL HEART RATE OR RHYTHM AFFECTING CARE OF MOTHER: Status: RESOLVED | Noted: 2022-03-09 | Resolved: 2022-08-18

## 2022-08-18 PROBLEM — O60.03 PRETERM LABOR IN THIRD TRIMESTER: Status: RESOLVED | Noted: 2021-11-04 | Resolved: 2022-08-18

## 2022-08-18 PROBLEM — O09.213 CURRENT PREGNANCY WITH HISTORY OF PRE-TERM LABOR IN THIRD TRIMESTER: Status: RESOLVED | Noted: 2021-09-30 | Resolved: 2022-08-18

## 2022-08-18 PROBLEM — O99.619 GASTROESOPHAGEAL REFLUX IN PREGNANCY: Status: RESOLVED | Noted: 2019-10-16 | Resolved: 2022-08-18

## 2022-08-18 PROBLEM — O98.513 COVID-19 AFFECTING PREGNANCY IN THIRD TRIMESTER: Status: ACTIVE | Noted: 2022-01-01

## 2022-08-18 PROBLEM — O99.340 DEPRESSION AFFECTING PREGNANCY: Status: RESOLVED | Noted: 2019-10-16 | Resolved: 2022-08-18

## 2022-08-18 PROBLEM — O26.893 PREGNANCY HEADACHE IN THIRD TRIMESTER: Status: RESOLVED | Noted: 2021-10-26 | Resolved: 2022-08-18

## 2022-08-18 PROBLEM — M54.9 BACK PAIN AFFECTING PREGNANCY IN THIRD TRIMESTER: Status: RESOLVED | Noted: 2021-10-26 | Resolved: 2022-08-18

## 2022-08-18 PROBLEM — U07.1 COVID-19 AFFECTING PREGNANCY IN THIRD TRIMESTER: Status: RESOLVED | Noted: 2022-01-01 | Resolved: 2022-08-18

## 2022-08-18 PROBLEM — Z23 ENCOUNTER FOR IMMUNIZATION: Status: RESOLVED | Noted: 2021-09-09 | Resolved: 2022-08-18

## 2022-08-18 PROBLEM — O34.33 CERVICAL INSUFFICIENCY IN PREGNANCY, ANTEPARTUM, THIRD TRIMESTER: Status: ACTIVE | Noted: 2021-11-16

## 2022-08-18 PROBLEM — O34.33 CERVICAL INSUFFICIENCY IN PREGNANCY, ANTEPARTUM, THIRD TRIMESTER: Status: RESOLVED | Noted: 2021-11-16 | Resolved: 2022-08-18

## 2022-08-18 PROBLEM — O34.33 CERVICAL CERCLAGE SUTURE PRESENT IN THIRD TRIMESTER: Status: RESOLVED | Noted: 2022-02-08 | Resolved: 2022-08-18

## 2022-08-18 PROBLEM — F32.A DEPRESSION AFFECTING PREGNANCY: Status: RESOLVED | Noted: 2019-10-16 | Resolved: 2022-08-18

## 2022-08-18 PROBLEM — O26.20 ABORTER, HABITUAL, ANTEPARTUM: Status: RESOLVED | Noted: 2021-09-09 | Resolved: 2022-08-18

## 2022-08-18 PROBLEM — R51.9 PREGNANCY HEADACHE IN THIRD TRIMESTER: Status: ACTIVE | Noted: 2021-10-26

## 2022-08-18 PROBLEM — O34.33 CERVICAL CERCLAGE SUTURE PRESENT, THIRD TRIMESTER: Status: RESOLVED | Noted: 2022-03-16 | Resolved: 2022-08-18

## 2022-08-18 PROBLEM — O26.899 RH NEGATIVE STATUS DURING PREGNANCY: Status: RESOLVED | Noted: 2021-09-13 | Resolved: 2022-08-18

## 2022-08-18 PROBLEM — K21.9 GASTROESOPHAGEAL REFLUX IN PREGNANCY: Status: RESOLVED | Noted: 2019-10-16 | Resolved: 2022-08-18

## 2022-08-18 PROBLEM — Z34.90 TERM PREGNANCY: Status: RESOLVED | Noted: 2022-03-16 | Resolved: 2022-08-18

## 2022-08-18 PROBLEM — O99.340 DEPRESSION AFFECTING PREGNANCY: Status: ACTIVE | Noted: 2019-10-16

## 2022-08-18 PROBLEM — O26.893 PREGNANCY HEADACHE IN THIRD TRIMESTER: Status: ACTIVE | Noted: 2021-10-26

## 2022-08-18 PROBLEM — Z3A.39 39 WEEKS GESTATION OF PREGNANCY: Status: RESOLVED | Noted: 2022-03-30 | Resolved: 2022-08-18

## 2022-08-18 PROBLEM — O09.93 HIGH-RISK PREGNANCY IN THIRD TRIMESTER: Status: RESOLVED | Noted: 2021-09-09 | Resolved: 2022-08-18

## 2022-08-18 PROBLEM — O99.619 GASTROESOPHAGEAL REFLUX IN PREGNANCY: Status: ACTIVE | Noted: 2019-10-16

## 2022-08-18 PROBLEM — M54.9 BACK PAIN AFFECTING PREGNANCY IN THIRD TRIMESTER: Status: ACTIVE | Noted: 2021-10-26

## 2022-08-18 PROBLEM — O09.93 HIGH-RISK PREGNANCY IN THIRD TRIMESTER: Status: ACTIVE | Noted: 2021-09-09

## 2022-08-18 PROBLEM — O98.513 COVID-19 AFFECTING PREGNANCY IN THIRD TRIMESTER: Status: RESOLVED | Noted: 2022-01-01 | Resolved: 2022-08-18

## 2022-08-18 PROBLEM — O34.29 HX OF UTERINE SURGERY AFFECTING CURRENT PREGNANCY: Status: ACTIVE | Noted: 2021-09-09

## 2022-08-18 PROBLEM — O60.03 PRETERM LABOR IN THIRD TRIMESTER: Status: ACTIVE | Noted: 2021-11-04

## 2022-08-18 PROBLEM — R10.9 ABDOMINAL PAIN DURING PREGNANCY IN THIRD TRIMESTER: Status: RESOLVED | Noted: 2022-03-23 | Resolved: 2022-08-18

## 2022-08-18 PROBLEM — O99.891 BACK PAIN AFFECTING PREGNANCY IN THIRD TRIMESTER: Status: RESOLVED | Noted: 2021-10-26 | Resolved: 2022-08-18

## 2022-08-18 PROBLEM — O09.213 CURRENT PREGNANCY WITH HISTORY OF PRE-TERM LABOR IN THIRD TRIMESTER: Status: ACTIVE | Noted: 2021-09-30

## 2022-08-18 PROBLEM — U07.1 COVID-19 AFFECTING PREGNANCY IN THIRD TRIMESTER: Status: ACTIVE | Noted: 2022-01-01

## 2022-08-18 PROBLEM — R51.9 PREGNANCY HEADACHE IN THIRD TRIMESTER: Status: RESOLVED | Noted: 2021-10-26 | Resolved: 2022-08-18

## 2022-08-18 PROBLEM — K21.9 GASTROESOPHAGEAL REFLUX IN PREGNANCY: Status: ACTIVE | Noted: 2019-10-16

## 2022-08-18 PROBLEM — Z67.91 RH NEGATIVE STATUS DURING PREGNANCY: Status: RESOLVED | Noted: 2021-09-13 | Resolved: 2022-08-18

## 2022-08-18 PROBLEM — O26.893 ABDOMINAL PAIN DURING PREGNANCY IN THIRD TRIMESTER: Status: RESOLVED | Noted: 2022-03-23 | Resolved: 2022-08-18

## 2022-08-19 ENCOUNTER — OFFICE VISIT (OUTPATIENT)
Dept: OBGYN CLINIC | Age: 37
End: 2022-08-19
Payer: COMMERCIAL

## 2022-08-19 VITALS
WEIGHT: 163 LBS | SYSTOLIC BLOOD PRESSURE: 102 MMHG | DIASTOLIC BLOOD PRESSURE: 60 MMHG | HEIGHT: 63 IN | BODY MASS INDEX: 28.88 KG/M2

## 2022-08-19 DIAGNOSIS — Z13.89 SCREENING FOR GENITOURINARY CONDITION: ICD-10-CM

## 2022-08-19 DIAGNOSIS — Z11.51 SCREENING FOR HUMAN PAPILLOMAVIRUS (HPV): ICD-10-CM

## 2022-08-19 DIAGNOSIS — Z01.419 WELL WOMAN EXAM: Primary | ICD-10-CM

## 2022-08-19 DIAGNOSIS — Z12.4 SCREENING FOR CERVICAL CANCER: ICD-10-CM

## 2022-08-19 LAB
BILIRUBIN, URINE, POC: NORMAL
BLOOD URINE, POC: NORMAL
GLUCOSE URINE, POC: NEGATIVE
KETONES, URINE, POC: NEGATIVE
LEUKOCYTE ESTERASE, URINE, POC: NORMAL
NITRITE, URINE, POC: NEGATIVE
PH, URINE, POC: 6 (ref 4.6–8)
PROTEIN,URINE, POC: NORMAL
SPECIFIC GRAVITY, URINE, POC: 1.03 (ref 1–1.03)
URINALYSIS CLARITY, POC: NORMAL
URINALYSIS COLOR, POC: NORMAL
UROBILINOGEN, POC: NORMAL

## 2022-08-19 PROCEDURE — 99395 PREV VISIT EST AGE 18-39: CPT | Performed by: OBSTETRICS & GYNECOLOGY

## 2022-08-19 PROCEDURE — 81002 URINALYSIS NONAUTO W/O SCOPE: CPT | Performed by: OBSTETRICS & GYNECOLOGY

## 2022-08-19 RX ORDER — DESOGESTREL AND ETHINYL ESTRADIOL 0.15-0.03
1 KIT ORAL DAILY
Qty: 1 PACKET | Refills: 11 | Status: SHIPPED | OUTPATIENT
Start: 2022-08-19

## 2022-08-19 NOTE — PROGRESS NOTES
no organomegaly, no ventral hernia  Pelvic: normal external genitalia, urethral meatus, urethra, vagina and cervix. Bimanual exam w/o masses or tenderness. Bladder nontender. Uterus normal size. Adnexae normal.  Perineum and anus normal. No hemorrhoids. Rectovaginal: no masses. Hemocult negative. Patient Active Problem List   Diagnosis   (none) - all problems resolved or deleted          Orders Placed This Encounter   Procedures    PAP IG, HPV Rfx HPV 16/18,45    AMB POC URINALYSIS DIP STICK MANUAL W/O MICRO          Pt counseled about resuming OCP's and anticipated cycle control associated with it. Rx Zoloft for adjustment reaction to gross stress. F/U in 3-5 weeks.        Molly Amezcua MD

## 2022-08-22 LAB
CYTOLOGIST CVX/VAG CYTO: NORMAL
CYTOLOGY CVX/VAG DOC THIN PREP: NORMAL
HPV APTIMA: NEGATIVE
Lab: NORMAL
PATH REPORT.FINAL DX SPEC: NORMAL
STAT OF ADQ CVX/VAG CYTO-IMP: NORMAL

## 2022-09-26 PROBLEM — F41.9 ANXIETY AND DEPRESSION: Status: ACTIVE | Noted: 2019-10-16

## 2022-10-21 ENCOUNTER — OFFICE VISIT (OUTPATIENT)
Dept: OBGYN CLINIC | Age: 37
End: 2022-10-21

## 2022-10-21 VITALS — HEIGHT: 63 IN | WEIGHT: 161 LBS | BODY MASS INDEX: 28.53 KG/M2

## 2022-10-21 DIAGNOSIS — Z79.899 FOLLOW-UP ENCOUNTER INVOLVING MEDICATION: Primary | ICD-10-CM

## 2022-10-21 RX ORDER — TINIDAZOLE 500 MG/1
2 TABLET ORAL
Qty: 8 TABLET | Refills: 0 | Status: SHIPPED | OUTPATIENT
Start: 2022-10-21 | End: 2022-10-23

## 2022-10-21 NOTE — PROGRESS NOTES
History of Present Illness:   Patient returns to the office today after initiation of therapy with Zoloft to improve her postpartum mood changes. Ultimately, she states she used it for two days and stopped using it, but did not notify our office. She has been doing well without it over the last five weeks per her report. She does appear quite well adjusted. Her infant daughter is with her and appears quite healthy and happy. They are both smiling today. She states that her social situation has improved since she was in here last.  Now, she is living with her grandmother, who is helping her with her other children and this arrangement is working out quite well. Assessment and Plan: With this in mind, I have suggested to her that she contact our office with any further complaints, questions or concerns or desire for help. She states she will. We will see her back for annual screening or on a p.r.n. basis.

## 2023-06-06 NOTE — PROGRESS NOTES
Complications: Failure to Progress in First Stage   11 IAB 2018           10 SAB 01/01/16 6w0d             Complications: History of surgical procedure   9 SAB 2015           8 SAB 01/01/14 7w0d          7 SAB 2013           6 SAB 01/01/10 7w0d          5 IAB 06/01/09 7w0d          4 IAB 01/01/09 7w0d          3 IAB 01/01/08 7w0d          2A Term 03/17/07 38w0d  6 lb 5 oz (2.863 kg) F Vag-Spont EPI Y KELLEE      Complications: Hyperemesis gravidarum   2B Term 03/17/07 38w0d  5 lb 3 oz (2.353 kg) M Vag-Spont EPI Y KELLEE      Complications: Hyperemesis gravidarum   1 IAB 01/01/04 7w0d                 Review of Systems   All other systems reviewed and are negative. Physical Exam  Constitutional:       General: She is not in acute distress. Appearance: Normal appearance. She is not ill-appearing. HENT:      Head: Normocephalic and atraumatic. Nose: Nose normal.   Eyes:      Extraocular Movements: Extraocular movements intact. Conjunctiva/sclera: Conjunctivae normal.   Pulmonary:      Effort: Pulmonary effort is normal. No respiratory distress. Abdominal:      Palpations: Abdomen is soft. Musculoskeletal:         General: Normal range of motion. Cervical back: Normal range of motion. Neurological:      General: No focal deficit present. Mental Status: She is alert and oriented to person, place, and time. Skin:     General: Skin is warm and dry. Psychiatric:         Mood and Affect: Mood normal.         Behavior: Behavior normal.          On this date 6/8/2023 I have spent 20 minutes reviewing previous notes, test results and face to face with the patient discussing the diagnosis and importance of compliance with the treatment plan as well as documenting on the day of the visit. An electronic signature was used to authenticate this note.     --Benjamin Jay MD known AFib

## 2023-06-08 ENCOUNTER — OFFICE VISIT (OUTPATIENT)
Dept: OBGYN CLINIC | Age: 38
End: 2023-06-08
Payer: COMMERCIAL

## 2023-06-08 VITALS — HEIGHT: 63 IN | BODY MASS INDEX: 30.79 KG/M2 | WEIGHT: 173.8 LBS

## 2023-06-08 DIAGNOSIS — R68.82 LOW LIBIDO: Primary | ICD-10-CM

## 2023-06-08 DIAGNOSIS — N93.9 ABNORMAL UTERINE BLEEDING (AUB): ICD-10-CM

## 2023-06-08 DIAGNOSIS — N64.4 MASTALGIA: ICD-10-CM

## 2023-06-08 PROCEDURE — 99213 OFFICE O/P EST LOW 20 MIN: CPT | Performed by: STUDENT IN AN ORGANIZED HEALTH CARE EDUCATION/TRAINING PROGRAM

## 2023-06-21 ENCOUNTER — TELEPHONE (OUTPATIENT)
Dept: OBGYN CLINIC | Age: 38
End: 2023-06-21

## 2023-06-21 DIAGNOSIS — N64.4 MASTALGIA: Primary | ICD-10-CM

## 2023-06-21 NOTE — TELEPHONE ENCOUNTER
Received phone call from [facility] Scheduling needing new order for breast imaging. Will resend order today.

## 2023-06-28 ENCOUNTER — HOSPITAL ENCOUNTER (OUTPATIENT)
Dept: MAMMOGRAPHY | Age: 38
Discharge: HOME | End: 2023-07-01
Attending: STUDENT IN AN ORGANIZED HEALTH CARE EDUCATION/TRAINING PROGRAM
Payer: COMMERCIAL

## 2023-06-28 DIAGNOSIS — N64.4 MASTALGIA: ICD-10-CM

## 2023-06-28 PROCEDURE — 76642 ULTRASOUND BREAST LIMITED: CPT

## 2023-06-28 PROCEDURE — 77066 DX MAMMO INCL CAD BI: CPT

## 2023-08-25 ENCOUNTER — PATIENT MESSAGE (OUTPATIENT)
Dept: OBGYN CLINIC | Age: 38
End: 2023-08-25

## 2023-08-25 DIAGNOSIS — R68.82 LOW LIBIDO: ICD-10-CM

## 2023-08-25 DIAGNOSIS — N93.9 ABNORMAL UTERINE BLEEDING (AUB): Primary | ICD-10-CM

## 2023-10-02 NOTE — PROGRESS NOTES
Allergies   Allergen Reactions    Metoclopramide Other (See Comments)     Jittery     Social History     Socioeconomic History    Marital status: Single     Spouse name: Not on file    Number of children: Not on file    Years of education: Not on file    Highest education level: Not on file   Occupational History    Not on file   Tobacco Use    Smoking status: Former    Smokeless tobacco: Never   Substance and Sexual Activity    Alcohol use: No    Drug use: No    Sexual activity: Yes     Partners: Male     Birth control/protection: None   Other Topics Concern    Not on file   Social History Narrative    Not on file     Social Determinants of Health     Financial Resource Strain: Not on file   Food Insecurity: Not on file   Transportation Needs: Not on file   Physical Activity: Not on file   Stress: Not on file   Social Connections: Not on file   Intimate Partner Violence: Not on file   Housing Stability: Not on file      Past Surgical History:   Procedure Laterality Date    DILATION AND CURETTAGE OF UTERUS      x9      OB History    Para Term  AB Living   12 2 2 0 10 3   SAB IAB Ectopic Molar Multiple Live Births   5 5     1 3      # Outcome Date GA Lbr Paul/2nd Weight Sex Delivery Anes PTL Lv   12 Term 22 39w1d  7 lb 7.9 oz (3.4 kg) F CS-LTranv EPI N KELLEE      Complications: Failure to Progress in First Stage   11 IAB            10 16 6w0d             Complications: History of surgical procedure   9 2015           8 14 7w0d          7 2013           6 SAB 01/01/10 7w0d          5 IAB 09 7w0d          4 IAB 09 7w0d          3 IAB 08 7w0d          2A Term 07 38w0d  6 lb 5 oz (2.863 kg) F Vag-Spont EPI Y KELLEE      Complications: Hyperemesis gravidarum   2B Term 07 38w0d  5 lb 3 oz (2.353 kg) M Vag-Spont EPI Y KELLEE      Complications: Hyperemesis gravidarum   1 IAB 04 7w0d                 Review of Systems   All other systems

## 2023-10-04 ENCOUNTER — OFFICE VISIT (OUTPATIENT)
Dept: OBGYN CLINIC | Age: 38
End: 2023-10-04
Payer: COMMERCIAL

## 2023-10-04 VITALS
HEIGHT: 63 IN | DIASTOLIC BLOOD PRESSURE: 72 MMHG | SYSTOLIC BLOOD PRESSURE: 116 MMHG | BODY MASS INDEX: 30.79 KG/M2 | WEIGHT: 173.8 LBS

## 2023-10-04 DIAGNOSIS — N93.9 ABNORMAL UTERINE BLEEDING (AUB): ICD-10-CM

## 2023-10-04 DIAGNOSIS — N76.0 ACUTE VAGINITIS: ICD-10-CM

## 2023-10-04 DIAGNOSIS — R82.90 MALODOROUS URINE: ICD-10-CM

## 2023-10-04 DIAGNOSIS — N64.4 MASTALGIA: ICD-10-CM

## 2023-10-04 DIAGNOSIS — R68.82 LOW LIBIDO: Primary | ICD-10-CM

## 2023-10-04 LAB
BILIRUBIN, URINE, POC: NORMAL
BLOOD URINE, POC: NEGATIVE
GLUCOSE URINE, POC: NEGATIVE
KETONES, URINE, POC: NORMAL
LEUKOCYTE ESTERASE, URINE, POC: NEGATIVE
NITRITE, URINE, POC: NEGATIVE
PH, URINE, POC: 7 (ref 4.6–8)
PROTEIN,URINE, POC: NORMAL
SPECIFIC GRAVITY, URINE, POC: 1.02 (ref 1–1.03)
URINALYSIS CLARITY, POC: CLEAR
URINALYSIS COLOR, POC: YELLOW
UROBILINOGEN, POC: NORMAL

## 2023-10-04 PROCEDURE — 81003 URINALYSIS AUTO W/O SCOPE: CPT | Performed by: STUDENT IN AN ORGANIZED HEALTH CARE EDUCATION/TRAINING PROGRAM

## 2023-10-04 PROCEDURE — 99214 OFFICE O/P EST MOD 30 MIN: CPT | Performed by: STUDENT IN AN ORGANIZED HEALTH CARE EDUCATION/TRAINING PROGRAM

## 2023-10-04 RX ORDER — DESOGESTREL AND ETHINYL ESTRADIOL 0.15-0.03
1 KIT ORAL DAILY
Qty: 3 PACKET | Refills: 4 | Status: SHIPPED | OUTPATIENT
Start: 2023-10-04

## 2023-10-04 RX ORDER — FLUOXETINE HYDROCHLORIDE 20 MG/1
20 CAPSULE ORAL DAILY
COMMUNITY

## 2023-10-04 RX ORDER — METRONIDAZOLE 500 MG/1
500 TABLET ORAL 2 TIMES DAILY
Qty: 10 TABLET | Refills: 0 | Status: SHIPPED | OUTPATIENT
Start: 2023-10-04 | End: 2023-10-09

## 2023-10-05 LAB
A VAGINAE DNA VAG QL NAA+PROBE: NORMAL SCORE
BVAB2 DNA VAG QL NAA+PROBE: NORMAL SCORE
C ALBICANS DNA VAG QL NAA+PROBE: NEGATIVE
C GLABRATA DNA VAG QL NAA+PROBE: NEGATIVE
MEGA1 DNA VAG QL NAA+PROBE: NORMAL SCORE
SPECIMEN SOURCE: NORMAL
T VAGINALIS RRNA SPEC QL NAA+PROBE: NEGATIVE

## 2023-10-19 ENCOUNTER — TELEPHONE (OUTPATIENT)
Dept: OBGYN CLINIC | Age: 38
End: 2023-10-19

## 2023-10-19 DIAGNOSIS — Z30.430 ENCOUNTER FOR IUD INSERTION: Primary | ICD-10-CM

## 2023-10-19 NOTE — TELEPHONE ENCOUNTER
----- Message from Heidi Otto sent at 10/18/2023  2:13 PM EDT -----  Regarding: CYTOTEC  Can you please send Cytotec for patient Scheduled with Dr Marie Lawrence 11/06/2023 for Mirena IUD Insertion, Thank you    Shwetha ([address] [place] [address]) [address] [facility], [address] [facility] [phone]

## 2023-10-20 RX ORDER — MISOPROSTOL 200 UG/1
TABLET ORAL
Qty: 2 TABLET | Refills: 0 | Status: SHIPPED | OUTPATIENT
Start: 2023-10-20

## 2024-07-11 ENCOUNTER — HOSPITAL ENCOUNTER (OUTPATIENT)
Dept: ULTRASOUND IMAGING | Age: 39
Discharge: HOME | End: 2024-07-11
Payer: COMMERCIAL

## 2024-07-11 DIAGNOSIS — E04.9 GOITER: ICD-10-CM

## 2024-07-11 PROCEDURE — 76536 US EXAM OF HEAD AND NECK: CPT

## 2025-06-11 ENCOUNTER — APPOINTMENT (OUTPATIENT)
Dept: GENERAL RADIOLOGY | Age: 40
End: 2025-06-11
Payer: OTHER MISCELLANEOUS

## 2025-06-11 ENCOUNTER — HOSPITAL ENCOUNTER (EMERGENCY)
Age: 40
Discharge: HOME | End: 2025-06-11
Payer: OTHER MISCELLANEOUS

## 2025-06-11 VITALS
WEIGHT: 190 LBS | BODY MASS INDEX: 33.66 KG/M2 | HEIGHT: 63 IN | SYSTOLIC BLOOD PRESSURE: 112 MMHG | HEART RATE: 67 BPM | RESPIRATION RATE: 17 BRPM | TEMPERATURE: 98.2 F | DIASTOLIC BLOOD PRESSURE: 78 MMHG | OXYGEN SATURATION: 98 %

## 2025-06-11 DIAGNOSIS — M54.50 LUMBAR PAIN: ICD-10-CM
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
## 2025-06-11 PROCEDURE — 96372 THER/PROPH/DIAG INJ SC/IM: CPT

## 2025-06-11 PROCEDURE — 6370000000 HC RX 637 (ALT 250 FOR IP)

## 2025-06-11 PROCEDURE — 99283 EMERGENCY DEPT VISIT LOW MDM: CPT

## 2025-06-11 PROCEDURE — 72100 X-RAY EXAM L-S SPINE 2/3 VWS: CPT

## 2025-06-11 PROCEDURE — 6360000002 HC RX W HCPCS

## 2025-06-11 RX ORDER — LIDOCAINE 4 G/G
1 PATCH TOPICAL DAILY
Qty: 30 PATCH | Refills: 0 | Status: SHIPPED | OUTPATIENT
Start: 2025-06-11 | End: 2025-07-11

## 2025-06-11 RX ORDER — CYCLOBENZAPRINE HCL 10 MG
10 TABLET ORAL
Status: COMPLETED | OUTPATIENT
Start: 2025-06-11 | End: 2025-06-11

## 2025-06-11 RX ORDER — CYCLOBENZAPRINE HCL 5 MG
5 TABLET ORAL 2 TIMES DAILY PRN
Qty: 10 TABLET | Refills: 0 | Status: SHIPPED | OUTPATIENT
Start: 2025-06-11 | End: 2025-06-21

## 2025-06-11 RX ORDER — KETOROLAC TROMETHAMINE 15 MG/ML
15 INJECTION, SOLUTION INTRAMUSCULAR; INTRAVENOUS
Status: COMPLETED | OUTPATIENT
Start: 2025-06-11 | End: 2025-06-11

## 2025-06-11 RX ORDER — LIDOCAINE 4 G/G
1 PATCH TOPICAL DAILY
Status: DISCONTINUED | OUTPATIENT
Start: 2025-06-11 | End: 2025-06-11 | Stop reason: HOSPADM

## 2025-06-11 RX ADMIN — CYCLOBENZAPRINE HYDROCHLORIDE 10 MG: 10 TABLET, FILM COATED ORAL at 12:08

## 2025-06-11 RX ADMIN — KETOROLAC TROMETHAMINE 15 MG: 15 INJECTION, SOLUTION INTRAMUSCULAR; INTRAVENOUS at 12:09

## 2025-06-11 ASSESSMENT — PAIN DESCRIPTION - ORIENTATION
ORIENTATION: RIGHT;LEFT
ORIENTATION: RIGHT;LEFT

## 2025-06-11 ASSESSMENT — PAIN DESCRIPTION - LOCATION
LOCATION: BACK;WRIST
LOCATION: BACK;WRIST

## 2025-06-11 ASSESSMENT — ENCOUNTER SYMPTOMS: BACK PAIN: 1

## 2025-06-11 ASSESSMENT — PAIN DESCRIPTION - DESCRIPTORS: DESCRIPTORS: ACHING

## 2025-06-11 ASSESSMENT — PAIN SCALES - GENERAL
PAINLEVEL_OUTOF10: 9
PAINLEVEL_OUTOF10: 7
PAINLEVEL_OUTOF10: 9

## 2025-06-11 ASSESSMENT — VISUAL ACUITY: OU: 1

## 2025-06-11 ASSESSMENT — LIFESTYLE VARIABLES
HOW MANY STANDARD DRINKS CONTAINING ALCOHOL DO YOU HAVE ON A TYPICAL DAY: PATIENT DOES NOT DRINK
HOW OFTEN DO YOU HAVE A DRINK CONTAINING ALCOHOL: NEVER

## 2025-06-11 ASSESSMENT — PAIN - FUNCTIONAL ASSESSMENT: PAIN_FUNCTIONAL_ASSESSMENT: 0-10

## 2025-06-11 NOTE — DISCHARGE INSTRUCTIONS
As discussed your workup today in the emergency department was reassuring, your x-ray did not reveal any acute abnormality.  I have started you on a short course of muscle relaxers, please take as prescribed.  Please use lidocaine over the areas of pain.  Please ice 3-4 times a day for at least 20 minutes.  You can use ibuprofen alternating with Tylenol every 3 hours as needed.  Please follow-up with your primary care provider in the next week for recheck of your symptoms.  If your symptoms change or worsen please return to the emergency department.
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
## 2025-06-11 NOTE — ED NOTES
Pt refused pregnancy tested and states \"Im on my period so I know I'm not pregnant.\"     Anai Walter, CURRY  06/11/25 [id number]

## (undated) DEVICE — Z INACTIVE USE 2527070 DRAPE SURG W40XL44IN UNDERBUTTOCK SMS POLYPR W/ PCH BK DISP

## (undated) DEVICE — KENDALL SCD EXPRESS SLEEVES, KNEE LENGTH, MEDIUM: Brand: KENDALL SCD

## (undated) DEVICE — PENCIL ES L3M BTTN SWCH S STL HEX LOK BLDE ELECTRD HOLSTER

## (undated) DEVICE — STERILE POLYISOPRENE POWDER-FREE SURGICAL GLOVES: Brand: PROTEXIS

## (undated) DEVICE — AMD ANTIMICROBIAL GAUZE SPONGES,12 PLY USP TYPE VII, 0.2% POLYHEXAMETHYLENE BIGUANIDE HCI (PHMB): Brand: CURITY

## (undated) DEVICE — (D)PREP SKN CHLRAPRP APPL 26ML -- CONVERT TO ITEM 371833

## (undated) DEVICE — LITHOTOMY: Brand: MEDLINE INDUSTRIES, INC.

## (undated) DEVICE — DRAPE TWL SURG 16X26IN BLU ORB04] ALLCARE INC]

## (undated) DEVICE — X-RAY SPONGES,12 PLY: Brand: DERMACEA

## (undated) DEVICE — MEDI-VAC NON-CONDUCTIVE SUCTION TUBING: Brand: CARDINAL HEALTH

## (undated) DEVICE — CATH FOL TY IC BAG 16FR 2000ML -- CONVERT TO ITEM 363158

## (undated) DEVICE — SURGICAL PROCEDURE PACK C SECT CDS

## (undated) DEVICE — AMD ANTIMICROBIAL NON-ADHERENT PAD,0.2% POLYHEXAMETHYLENE BIGUANIDE HCI (PHMB): Brand: TELFA

## (undated) DEVICE — Device: Brand: PORTEX

## (undated) DEVICE — SUT CHRMC 1 36IN CTX BRN --

## (undated) DEVICE — SUTURE VCRL SZ 4-0 L27IN ABSRB UD L60MM KS STR REV CUT NDL J662H

## (undated) DEVICE — PREMIUM WET SKIN PREP TRAY: Brand: MEDLINE INDUSTRIES, INC.

## (undated) DEVICE — GOWN,REINF,POLY,ECL,PP SLV,XL: Brand: MEDLINE

## (undated) DEVICE — REM POLYHESIVE ADULT PATIENT RETURN ELECTRODE: Brand: VALLEYLAB

## (undated) DEVICE — SUTURE PROL 2 L60IN NONABSORBABLE BLU TP 1 L65MM 1 2 CIR 8825G

## (undated) DEVICE — SUTURE VCRL SZ 1 L27IN ABSRB UD CT-1 L36MM 1/2 CIR J261H

## (undated) DEVICE — SOLUTION IV 1000ML 0.9% SOD CHL

## (undated) DEVICE — SOLUTION IRRIG 1000ML H2O STRL BLT